# Patient Record
Sex: FEMALE | Race: BLACK OR AFRICAN AMERICAN | NOT HISPANIC OR LATINO | Employment: FULL TIME | URBAN - NONMETROPOLITAN AREA
[De-identification: names, ages, dates, MRNs, and addresses within clinical notes are randomized per-mention and may not be internally consistent; named-entity substitution may affect disease eponyms.]

---

## 2017-02-13 ENCOUNTER — OFFICE VISIT (OUTPATIENT)
Dept: NEUROLOGY | Facility: CLINIC | Age: 37
End: 2017-02-13

## 2017-02-13 VITALS
DIASTOLIC BLOOD PRESSURE: 84 MMHG | HEART RATE: 86 BPM | HEIGHT: 63 IN | BODY MASS INDEX: 30.3 KG/M2 | SYSTOLIC BLOOD PRESSURE: 122 MMHG | WEIGHT: 171 LBS

## 2017-02-13 DIAGNOSIS — G40.109 LOCALIZATION-RELATED SYMPTOMATIC EPILEPSY AND EPILEPTIC SYNDROMES WITH SIMPLE PARTIAL SEIZURES, NOT INTRACTABLE, WITHOUT STATUS EPILEPTICUS (HCC): Primary | ICD-10-CM

## 2017-02-13 DIAGNOSIS — R51.9 GENERALIZED HEADACHES: ICD-10-CM

## 2017-02-13 DIAGNOSIS — Q28.2 CEREBRAL AVM: ICD-10-CM

## 2017-02-13 DIAGNOSIS — Z98.890 S/P CRANIOTOMY: ICD-10-CM

## 2017-02-13 PROCEDURE — 99214 OFFICE O/P EST MOD 30 MIN: CPT | Performed by: CLINICAL NURSE SPECIALIST

## 2017-02-13 RX ORDER — TOPIRAMATE 50 MG/1
50 CAPSULE, EXTENDED RELEASE ORAL NIGHTLY
Qty: 14 CAPSULE | Refills: 0 | COMMUNITY
Start: 2017-02-13 | End: 2017-03-16

## 2017-02-13 RX ORDER — TOPIRAMATE 25 MG/1
25 CAPSULE, EXTENDED RELEASE ORAL NIGHTLY
Qty: 7 CAPSULE | Refills: 0 | COMMUNITY
Start: 2017-02-13 | End: 2017-03-16

## 2017-02-13 NOTE — PROGRESS NOTES
Subjective     Chief Complaint   Patient presents with   • Seizures     No seizure since last visit. She does have concerns of twitching above her lip on the left side.        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  She is here today for follow up for partial seizures, HA, and SP AVM, crani with clipping. She was last seen 8/2016.  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  Some time after her last appointment she stopped all medications except verapamil that she took 3-4 times per week. Reason she stopped taking AEDs Vimpat and Topamax (which was using dual therapy) was because it was causing sleepiness/dizziness when she was driving home after working night shift.  Since stopping she tells me she had one episode when she felt like she may have had a seizure in her sleep because she was extremely sore on the left side. This occurred in August 2016.  She denies having any since. She does a few times a week during the night shift have left facial twitching, but no loss of consciousness.  She also has HA 2-3 times per week. She denies any change in her condition. She did have labs done in 12/2016 that I have reviewed.   Seizures    This is a chronic problem. Episode onset: August 2016. Associated symptoms include sleepiness, confusion, headaches, nausea, vomiting and muscle weakness (soreness). Pertinent negatives include no sore throat, no chest pain, no cough and no diarrhea. Characteristics include rhythmic jerking and loss of consciousness. Characteristics do not include bowel incontinence, bladder incontinence or bit tongue. staring spelss, left lip twitch, during then night   Headache    This is a chronic problem. The current episode started more than 1 year ago. Episode frequency: 2/week. The problem has been unchanged. The pain is located in the bilateral and frontal (wraps around head) region. Associated symptoms include dizziness,  nausea, phonophobia, photophobia, seizures, tingling (left side of face) and vomiting. Pertinent negatives include no coughing, fever, sinus pressure, sore throat, tinnitus or weakness. The symptoms are aggravated by food. Treatments tried: verapamil , topamax. The treatment provided significant relief.        Current Outpatient Prescriptions   Medication Sig Dispense Refill   • Eslicarbazepine Acetate 400 MG tablet Take 400 mg by mouth Every Night. 7 tablet 0   • Eslicarbazepine Acetate 800 MG tablet Take 800 mg by mouth Every Night. 14 tablet 0   • Topiramate ER (TROKENDI XR) 25 MG capsule sustained-release 24 hr Take 25 mg by mouth Every Night. 7 capsule 0   • Topiramate ER 50 MG capsule sustained-release 24 hr Take 50 mg by mouth Every Night. 14 capsule 0   • verapamil SR (CALAN-SR) 120 MG CR tablet Take 1 tablet by mouth Every Night. 30 tablet 2     No current facility-administered medications for this visit.        Past Medical History   Diagnosis Date   • Cerebral aneurysm    • GERD (gastroesophageal reflux disease)    • Hypertension        Past Surgical History   Procedure Laterality Date   • Hysterectomy     • Craniotomy     • Cholecystectomy     • Endometrial ablation     •  section       x2   • Appendectomy         family history includes No Known Problems in her brother, brother, brother, brother, daughter, daughter, and sister; Rheum arthritis in her mother.    Social History   Substance Use Topics   • Smoking status: Never Smoker   • Smokeless tobacco: Never Used   • Alcohol use No       Review of Systems   Constitutional: Negative.  Negative for fatigue and fever.   HENT: Negative for sinus pressure, sore throat and tinnitus.    Eyes: Positive for photophobia.   Respiratory: Negative.  Negative for cough, chest tightness and shortness of breath.    Cardiovascular: Negative.  Negative for chest pain.   Gastrointestinal: Positive for nausea and vomiting. Negative for bowel incontinence,  "constipation and diarrhea.   Endocrine: Negative.    Genitourinary: Negative.  Negative for bladder incontinence, dysuria and frequency.   Musculoskeletal: Negative for arthralgias and myalgias.   Skin: Negative.    Allergic/Immunologic: Negative.    Neurological: Positive for dizziness, tingling (left side of face), seizures, loss of consciousness and headaches. Negative for facial asymmetry and weakness.   Hematological: Negative.  Negative for adenopathy.   Psychiatric/Behavioral: Positive for confusion. Negative for agitation.   All other systems reviewed and are negative.      Objective     Visit Vitals   • /84   • Pulse 86   • Ht 63\" (160 cm)   • Wt 171 lb (77.6 kg)   • BMI 30.29 kg/m2   , Body mass index is 30.29 kg/(m^2).    Physical Exam   Constitutional: She is oriented to person, place, and time. Vital signs are normal. She appears well-developed and well-nourished.   HENT:   Head: Normocephalic and atraumatic.   Right Ear: Hearing and external ear normal.   Left Ear: Hearing and external ear normal.   Nose: Nose normal.   Mouth/Throat: Uvula is midline, oropharynx is clear and moist and mucous membranes are normal.   Eyes: EOM and lids are normal. Pupils are equal, round, and reactive to light.   Neck: Trachea normal and normal range of motion. Neck supple. Carotid bruit is not present.   Cardiovascular: Normal rate, regular rhythm, S2 normal and normal heart sounds.    No murmur heard.  Pulmonary/Chest: Effort normal and breath sounds normal.   Abdominal: Soft. Bowel sounds are normal.   Musculoskeletal: Normal range of motion.   Neurological: She is alert and oriented to person, place, and time. She has normal strength and normal reflexes. She displays no tremor. No sensory deficit. She displays a negative Romberg sign. Gait normal. Abnormal coordination: past pointing bilateral finger to nose. no ataxia. GCS eye subscore is 4. GCS verbal subscore is 5.   Reflex Scores:       Tricep reflexes are " 2+ on the right side and 2+ on the left side.       Bicep reflexes are 2+ on the right side and 2+ on the left side.       Brachioradialis reflexes are 2+ on the right side and 2+ on the left side.       Patellar reflexes are 2+ on the right side and 2+ on the left side.       Achilles reflexes are 2+ on the right side and 2+ on the left side.  CN II:  Visual fields intact in upper fields. Decrease in lower fields.  Pupils equally reactive to light  CN III, IV, VI:  Extraocular Muscles full with no signs of nystagmus  CN V:  Facial sensory is symmetric with no asymetries.  CN VII:  Facial motor symmetric  CN VIII:  Gross hearing intact bilaterally  CN IX:  Palate elevates symmetrically  CN X:  Palate elevates symmetrically  CN XI:  Shoulder shrug symmetric  CN XII:  Tongue is midline on protrusion   Skin: Skin is warm and dry.   Psychiatric: She has a normal mood and affect. Her speech is normal and behavior is normal. Cognition and memory are normal.   Nursing note and vitals reviewed.      Results for orders placed or performed during the hospital encounter of 12/18/16   Urine Culture   Result Value Ref Range    Urine Culture <10,000 CFU/mL Gram Negative Bacilli (A)     Urine Culture 10,000-20,000 CFU/mL Mixed Gram Positive Ade (A)    Comprehensive Metabolic Panel   Result Value Ref Range    Glucose 131 (H) 70 - 100 mg/dL    BUN 16 5 - 21 mg/dL    Creatinine 0.86 0.50 - 1.40 mg/dL    Sodium 138 135 - 145 mmol/L    Potassium 3.8 3.5 - 5.3 mmol/L    Chloride 102 98 - 110 mmol/L    CO2 23.0 (L) 24.0 - 31.0 mmol/L    Calcium 9.7 8.4 - 10.4 mg/dL    Total Protein 8.1 6.3 - 8.7 g/dL    Albumin 4.40 3.50 - 5.00 g/dL    ALT (SGPT) 24 0 - 54 U/L    AST (SGOT) 21 7 - 45 U/L    Alkaline Phosphatase 72 24 - 120 U/L    Total Bilirubin 0.6 0.1 - 1.0 mg/dL    eGFR  African Amer 90 >60 mL/min/1.73    Globulin 3.7 gm/dL    A/G Ratio 1.2 1.1 - 2.5 g/dL    BUN/Creatinine Ratio 18.6 7.0 - 25.0    Anion Gap 13.0 4.0 - 13.0 mmol/L    aPTT   Result Value Ref Range    PTT 26.9 24.1 - 34.8 seconds   Protime-INR   Result Value Ref Range    Protime 13.1 11.9 - 14.6 Seconds    INR 0.96 0.91 - 1.09   Amylase   Result Value Ref Range    Amylase 64 30 - 110 U/L   Lipase   Result Value Ref Range    Lipase 98 23 - 203 U/L   Urinalysis With / Culture If Indicated   Result Value Ref Range    Color, UA Dark Yellow (A) Yellow, Straw    Appearance, UA Cloudy (A) Clear    pH, UA 6.5 5.0 - 8.0    Specific Gravity, UA 1.028 1.005 - 1.030    Glucose, UA Negative Negative    Ketones, UA Trace (A) Negative    Bilirubin, UA Negative Negative    Blood, UA Negative Negative    Protein, UA Negative Negative    Leuk Esterase, UA Trace (A) Negative    Nitrite, UA Negative Negative    Urobilinogen, UA 0.2 E.U./dL 0.2 - 1.0 E.U./dL   CBC Auto Differential   Result Value Ref Range    WBC 12.03 (H) 4.80 - 10.80 10*3/mm3    RBC 4.78 4.20 - 5.40 10*6/mm3    Hemoglobin 13.3 12.0 - 16.0 g/dL    Hematocrit 40.2 37.0 - 47.0 %    MCV 84.1 82.0 - 98.0 fL    MCH 27.8 (L) 28.0 - 32.0 pg    MCHC 33.1 33.0 - 36.0 g/dL    RDW 13.9 12.0 - 15.0 %    RDW-SD 42.4 40.0 - 54.0 fl    MPV 11.2 6.0 - 12.0 fL    Platelets 284 130 - 400 10*3/mm3    Neutrophil % 82.4 (H) 39.0 - 78.0 %    Lymphocyte % 13.5 (L) 15.0 - 45.0 %    Monocyte % 3.6 (L) 4.0 - 12.0 %    Eosinophil % 0.2 0.0 - 4.0 %    Basophil % 0.1 0.0 - 2.0 %    Immature Grans % 0.2 0.0 - 5.0 %    Neutrophils, Absolute 9.91 (H) 1.87 - 8.40 10*3/mm3    Lymphocytes, Absolute 1.63 0.72 - 4.86 10*3/mm3    Monocytes, Absolute 0.43 0.19 - 1.30 10*3/mm3    Eosinophils, Absolute 0.02 0.00 - 0.70 10*3/mm3    Basophils, Absolute 0.01 0.00 - 0.20 10*3/mm3    Immature Grans, Absolute 0.03 0.00 - 0.03 10*3/mm3   Urinalysis, Microscopic Only   Result Value Ref Range    RBC, UA 0-2 (A) None Seen /HPF    WBC, UA 6-12 (A) None Seen /HPF    Bacteria, UA 2+ (A) None Seen /HPF    Squamous Epithelial Cells, UA 13-20 (A) None Seen, 0-2 /HPF    Hyaline Casts, UA  7-12 None Seen /LPF    Methodology Automated Microscopy    POCT Occult Blood, stool   Result Value Ref Range    Fecal Occult Blood Negative     Lot Number [id number]     Expiration Date      DEVELOPER LOT NUMBER \[id number]\     DEVELOPER EXPIRATION DATE \     Positive Control Positive Positive    Negative Control Negative Negative   Type & Screen   Result Value Ref Range    ABO Type A     RH type Positive     Antibody Screen Negative         ASSESSMENT/PLAN    Diagnoses and all orders for this visit:    Localization-related symptomatic epilepsy and epileptic syndromes with simple partial seizures, not intractable, without status epilepticus    Cerebral AVM    S/P craniotomy  Comments:  clipping of AVM    Generalized headaches    Other orders  -     verapamil SR (CALAN-SR) 120 MG CR tablet; Take 1 tablet by mouth Every Night.  -     Topiramate ER (TROKENDI XR) 25 MG capsule sustained-release 24 hr; Take 25 mg by mouth Every Night.  -     Topiramate ER 50 MG capsule sustained-release 24 hr; Take 50 mg by mouth Every Night.  -     Eslicarbazepine Acetate 400 MG tablet; Take 400 mg by mouth Every Night.  -     Eslicarbazepine Acetate 800 MG tablet; Take 800 mg by mouth Every Night.    MEDICAL DECISION MAKIN. Continue with Verapamil 120 mg at HS for HA.  2. Patient had stopped vimpat and topamax and will remain off of these for now.  3. Will start Trokendi XR 25mg titration to 50 mg at HS for now.counseled on side effects and patient has taken in the past and previously tolerated. Will likely need to increase dose at next follow up.  If insurance will not cover Trokendi will likely need to go back to Topamax.   4. Will start Aptiom 400 mg daily for one week then 800 mg daily there after and will likely increase to 1200 mg daily at next follow up if patient tolerating well. counseled on side effects.  5. Obtain CBC, CMP at next follow up.   allergies and all known medications/prescriptions have been reviewed  using resources available on this encounter.  6. Seizure precautions were discussed to include no tub baths, no swimming, avoiding lack of sleep, and avoiding known triggers. Education given of things that may contribute to a seizure to include, but not limited to: stressful situations, fever, fatigue, lack of sleep, low blood sugar, hyperventilation, flashing lights, and caffeine. Instructions given to take seizure medications as prescribed. Education given to family member on what to do during a seizure and care following the seizure. Education given to contact this office prior to stopping or changing any medications.  7. BRYCE REVIEWED.    Return in about 2 weeks (around 2/27/2017).        Keeley Bourne, APRN

## 2017-02-28 ENCOUNTER — OFFICE VISIT (OUTPATIENT)
Dept: NEUROLOGY | Facility: CLINIC | Age: 37
End: 2017-02-28

## 2017-02-28 VITALS
HEART RATE: 94 BPM | HEIGHT: 63 IN | DIASTOLIC BLOOD PRESSURE: 84 MMHG | BODY MASS INDEX: 29.06 KG/M2 | SYSTOLIC BLOOD PRESSURE: 122 MMHG | WEIGHT: 164 LBS | RESPIRATION RATE: 16 BRPM

## 2017-02-28 DIAGNOSIS — Q28.2 CEREBRAL AVM: ICD-10-CM

## 2017-02-28 DIAGNOSIS — Z98.890 S/P CRANIOTOMY: ICD-10-CM

## 2017-02-28 DIAGNOSIS — G40.109 LOCALIZATION-RELATED SYMPTOMATIC EPILEPSY AND EPILEPTIC SYNDROMES WITH SIMPLE PARTIAL SEIZURES, NOT INTRACTABLE, WITHOUT STATUS EPILEPTICUS (HCC): Primary | ICD-10-CM

## 2017-02-28 DIAGNOSIS — R51.9 GENERALIZED HEADACHES: ICD-10-CM

## 2017-02-28 PROCEDURE — 99213 OFFICE O/P EST LOW 20 MIN: CPT | Performed by: CLINICAL NURSE SPECIALIST

## 2017-02-28 RX ORDER — IBUPROFEN 600 MG/1
TABLET ORAL EVERY 6 HOURS PRN
COMMUNITY
Start: 2015-04-05 | End: 2017-10-13

## 2017-02-28 RX ORDER — POLYETHYLENE GLYCOL 3350 17 G/17G
POWDER, FOR SOLUTION ORAL
COMMUNITY
End: 2017-10-13

## 2017-02-28 RX ORDER — TOPIRAMATE 100 MG/1
1 CAPSULE, EXTENDED RELEASE ORAL NIGHTLY
Qty: 7 CAPSULE | Refills: 0 | COMMUNITY
Start: 2017-02-28 | End: 2017-03-16

## 2017-02-28 RX ORDER — ALBUTEROL SULFATE 90 UG/1
AEROSOL, METERED RESPIRATORY (INHALATION)
COMMUNITY
End: 2019-10-23

## 2017-02-28 NOTE — PROGRESS NOTES
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 She resumed care 2/13/17 with prior visit on  8/2016.  Trokendi XR had been resumed as well as addition of Aptiom titrating to 800 mg daily. Patient is tolerating and states she has less sleepiness from this combination of treatment.   Since the start of Aptiom and Trokendi she states she has had no further facial twitches or staring episodes and actually feels very well. She denies side effects associated with the medications.    She is currently being treated for bronchitis.     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 Some time after her last appointment she stopped all medications except verapamil that she took 3-4 times per week. Reason she stopped taking AEDs Vimpat and Topamax (which was using dual therapy) was because it was causing sleepiness/dizziness when she was driving home after working night shift. Since stopping she tells me she had one episode when she felt like she may have had a seizure in her sleep because she was extremely sore on the left side. This occurred in August 2016. She denies having any since. She does a few times a week during the night shift have left facial twitching, but no loss of consciousness. She also has HA 2-3 times per week. She denies any change in her condition. She did have labs done in 12/2016 that I have reviewed.   HPI Comments: Seizure/spell on 2/15/17:  Was not feeling well that morning was having involuntary tremor and had not started the Trokendi or Aptiom  Did not lose consciousness, but did stare off  Lasted about 3-4 minutes  Still working nights so taking in the morning when going to bed in the morning  No tongue biting, incontinence.       Seizures     This is a chronic problem. Episode onset: August 2016. Associated symptoms include sleepiness, confusion, headaches, nausea, vomiting and muscle weakness (soreness). Pertinent negatives include no sore throat, no chest pain, no cough and no diarrhea. Characteristics include rhythmic jerking and loss of consciousness. Characteristics do not include bowel incontinence, bladder incontinence or bit tongue. staring spelss, left lip twitch, during then night   Headache    This is a chronic problem. The current episode started more than 1 year ago. Episode frequency: 2/week. The problem has been unchanged. The pain is located in the bilateral and frontal (wraps around head) region. Associated symptoms include dizziness, nausea, phonophobia, photophobia, seizures, tingling (left side of face) and vomiting. Pertinent negatives include no coughing, fever, sinus pressure, sore throat, tinnitus or weakness. The symptoms are aggravated by food. Treatments tried: verapamil , topamax. The treatment provided significant relief.        Current Outpatient Prescriptions   Medication Sig Dispense Refill   • albuterol (PROVENTIL HFA;VENTOLIN HFA) 108 (90 BASE) MCG/ACT inhaler Inhale into the lungs.     • doxycycline (MONODOX) 100 MG capsule Take 1 capsule by mouth 2 (Two) Times a Day for 10 days. (avoid significant sun exposure while on this medication) 20 capsule 0   • Eslicarbazepine Acetate 400 MG tablet Take 400 mg by mouth Every Night. 7 tablet 0   • Eslicarbazepine Acetate 800 MG tablet Take 800 mg by mouth Every Night. 14 tablet 0   • ibuprofen (ADVIL,MOTRIN) 600 MG tablet      • lacosamide (VIMPAT) 100 MG tablet tablet Take 100 mg by mouth 2 times daily.     • omeprazole (priLOSEC) 20 MG capsule Take 20 mg by mouth daily.     • polyethylene glycol (MIRALAX) powder Take 17 g by mouth daily.     • Topiramate ER (TROKENDI XR) 25 MG capsule sustained-release 24 hr Take 25 mg by mouth Every Night. 7 capsule 0   • Topiramate ER 50  MG capsule sustained-release 24 hr Take 50 mg by mouth Every Night. 14 capsule 0   • verapamil SR (CALAN-SR) 120 MG CR tablet Take 1 tablet by mouth Every Night. 30 tablet 2   • benzonatate (TESSALON) 200 MG capsule Take 1 capsule by mouth 3 (Three) Times a Day As Needed for cough for up to 7 days. 20 capsule 0     No current facility-administered medications for this visit.        Past Medical History   Diagnosis Date   • Cerebral aneurysm    • GERD (gastroesophageal reflux disease)    • Hypertension        Past Surgical History   Procedure Laterality Date   • Hysterectomy     • Craniotomy     • Cholecystectomy     • Endometrial ablation     •  section       x2   • Appendectomy         family history includes No Known Problems in her brother, brother, brother, brother, daughter, daughter, and sister; Rheum arthritis in her mother.    Social History   Substance Use Topics   • Smoking status: Never Smoker   • Smokeless tobacco: Never Used   • Alcohol use No       Review of Systems   Constitutional: Negative.  Negative for fatigue and fever.   HENT: Negative for sinus pressure, sore throat and tinnitus.    Eyes: Positive for photophobia.   Respiratory: Negative.  Negative for cough, chest tightness and shortness of breath.    Cardiovascular: Negative.  Negative for chest pain.   Gastrointestinal: Positive for nausea and vomiting. Negative for bowel incontinence, constipation and diarrhea.   Endocrine: Negative.    Genitourinary: Negative.  Negative for bladder incontinence, dysuria and frequency.   Musculoskeletal: Negative for arthralgias and myalgias.   Skin: Negative.    Allergic/Immunologic: Negative.    Neurological: Positive for dizziness, tingling (left side of face), seizures, loss of consciousness and headaches. Negative for facial asymmetry and weakness.   Hematological: Negative.  Negative for adenopathy.   Psychiatric/Behavioral: Positive for confusion. Negative for agitation.   All other systems  "reviewed and are negative.      Objective     Visit Vitals   • /84 (BP Location: Left arm, Patient Position: Sitting, Cuff Size: Adult)   • Pulse 94   • Resp 16   • Ht 63\" (160 cm)   • Wt 164 lb (74.4 kg)   • BMI 29.05 kg/m2   , Body mass index is 29.05 kg/(m^2).    Physical Exam   Constitutional: She is oriented to person, place, and time. Vital signs are normal. She appears well-developed and well-nourished.   HENT:   Head: Normocephalic and atraumatic.   Right Ear: Hearing and external ear normal.   Left Ear: Hearing and external ear normal.   Nose: Nose normal.   Mouth/Throat: Uvula is midline, oropharynx is clear and moist and mucous membranes are normal.   Eyes: EOM and lids are normal. Pupils are equal, round, and reactive to light.   Neck: Trachea normal and normal range of motion. Neck supple. Carotid bruit is not present.   Cardiovascular: Normal rate, regular rhythm, S2 normal and normal heart sounds.    No murmur heard.  Pulmonary/Chest: Effort normal and breath sounds normal.   Abdominal: Soft. Bowel sounds are normal.   Musculoskeletal: Normal range of motion.   Neurological: She is alert and oriented to person, place, and time. She has normal strength and normal reflexes. She displays no tremor. No sensory deficit. She displays a negative Romberg sign. Gait normal. Abnormal coordination: past pointing bilateral finger to nose. no ataxia. GCS eye subscore is 4. GCS verbal subscore is 5.   Reflex Scores:       Tricep reflexes are 2+ on the right side and 2+ on the left side.       Bicep reflexes are 2+ on the right side and 2+ on the left side.       Brachioradialis reflexes are 2+ on the right side and 2+ on the left side.       Patellar reflexes are 2+ on the right side and 2+ on the left side.       Achilles reflexes are 2+ on the right side and 2+ on the left side.  CN II:  Visual fields intact in upper fields. Decrease in lower fields.  Pupils equally reactive to light  CN III, IV, VI:  " Extraocular Muscles full with no signs of nystagmus  CN V:  Facial sensory is symmetric with no asymetries.  CN VII:  Facial motor symmetric  CN VIII:  Gross hearing intact bilaterally  CN IX:  Palate elevates symmetrically  CN X:  Palate elevates symmetrically  CN XI:  Shoulder shrug symmetric  CN XII:  Tongue is midline on protrusion   Skin: Skin is warm and dry.   Psychiatric: She has a normal mood and affect. Her speech is normal and behavior is normal. Cognition and memory are normal.   Nursing note and vitals reviewed.      Results for orders placed or performed during the hospital encounter of 17   POCT Influenza A/B   Result Value Ref Range    Rapid Influenza A Ag NEG     Rapid Influenza B Ag NEG     Internal Control Passed Passed    Lot Number [id number]     Expiration Date 18         ASSESSMENT/PLAN    Diagnoses and all orders for this visit:    Cerebral AVM    Localization-related symptomatic epilepsy and epileptic syndromes with simple partial seizures, not intractable, without status epilepticus    Generalized headaches    S/P craniotomy    Other orders  -     albuterol (PROVENTIL HFA;VENTOLIN HFA) 108 (90 BASE) MCG/ACT inhaler; Inhale into the lungs.  -     ibuprofen (ADVIL,MOTRIN) 600 MG tablet;   -     polyethylene glycol (MIRALAX) powder; Take 17 g by mouth daily.      MEDICAL DECISION MAKIN. Titrate Aptiom to 1200 mg daily.  2. Titrate Trokendi XR to 200 mg daily.  3. Patient counseled on compliance with medications and side effects.   4. Obtain CT head  5. Seizure precautions were discussed to include no tub baths, no swimming, avoiding lack of sleep, and avoiding known triggers. Education given of things that may contribute to a seizure to include, but not limited to: stressful situations, fever, fatigue, lack of sleep, low blood sugar, hyperventilation, flashing lights, and caffeine. Instructions given to take seizure medications as prescribed. Education given to family member on  what to do during a seizure and care following the seizure. Education given to contact this office prior to stopping or changing any medications.       allergies and all known medications/prescriptions have been reviewed using resources available on this encounter.    No Follow-up on file.        Keeley Bourne, ARA

## 2017-03-09 ENCOUNTER — HOSPITAL ENCOUNTER (OUTPATIENT)
Dept: CT IMAGING | Facility: HOSPITAL | Age: 37
Discharge: HOME | End: 2017-03-09
Admitting: CLINICAL NURSE SPECIALIST

## 2017-03-09 DIAGNOSIS — G40.109 LOCALIZATION-RELATED SYMPTOMATIC EPILEPSY AND EPILEPTIC SYNDROMES WITH SIMPLE PARTIAL SEIZURES, NOT INTRACTABLE, WITHOUT STATUS EPILEPTICUS (HCC): ICD-10-CM

## 2017-03-09 DIAGNOSIS — R51.9 GENERALIZED HEADACHES: ICD-10-CM

## 2017-03-09 DIAGNOSIS — Z98.890 S/P CRANIOTOMY: ICD-10-CM

## 2017-03-09 DIAGNOSIS — Q28.2 CEREBRAL AVM: ICD-10-CM

## 2017-03-09 PROCEDURE — 70450 CT HEAD/BRAIN W/O DYE: CPT

## 2017-03-16 ENCOUNTER — OFFICE VISIT (OUTPATIENT)
Dept: NEUROLOGY | Facility: CLINIC | Age: 37
End: 2017-03-16

## 2017-03-16 VITALS
DIASTOLIC BLOOD PRESSURE: 68 MMHG | HEIGHT: 63 IN | SYSTOLIC BLOOD PRESSURE: 100 MMHG | BODY MASS INDEX: 29.06 KG/M2 | WEIGHT: 164 LBS | HEART RATE: 78 BPM

## 2017-03-16 DIAGNOSIS — Q28.2 CEREBRAL AVM: ICD-10-CM

## 2017-03-16 DIAGNOSIS — Z98.890 S/P CRANIOTOMY: ICD-10-CM

## 2017-03-16 DIAGNOSIS — G40.109 LOCALIZATION-RELATED SYMPTOMATIC EPILEPSY AND EPILEPTIC SYNDROMES WITH SIMPLE PARTIAL SEIZURES, NOT INTRACTABLE, WITHOUT STATUS EPILEPTICUS (HCC): Primary | ICD-10-CM

## 2017-03-16 DIAGNOSIS — R51.9 GENERALIZED HEADACHES: ICD-10-CM

## 2017-03-16 PROCEDURE — 99213 OFFICE O/P EST LOW 20 MIN: CPT | Performed by: CLINICAL NURSE SPECIALIST

## 2017-03-16 NOTE — PROGRESS NOTES
Subjective     Chief Complaint   Patient presents with   • Seizures     No sz       Mandy Barrera is a 36 y.o. 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  She is here today for follow up for HA and seizure. She was last seen 2/28/17.  She is now taking Aptiom 1200 mg nightly and Trokendi  mg nightly. She denies seizure, staring spells, involuntary tremor, or facial twitches. The last reported symptoms was 2/15 and she had started her medication. She denies side effects with Aptiom.  She does have some paresthesia with Trokendi. She denies HA.  She did have CT of head as she has history of AVM with clipping and does have some residual deficit of lower visual fields. I have reviewed results with the patient.    HPI Comments: Seizure/spell on 2/15/17:  Was not feeling well that morning was having involuntary tremor and had not started the Trokendi or Aptiom  Did not lose consciousness, but did stare off  Lasted about 3-4 minutes  Still working nights so taking in the morning when going to bed in the morning  No tongue biting, incontinence.       Seizures    This is a chronic problem. Episode onset: August 2016. Associated symptoms include sleepiness, confusion, headaches, nausea, vomiting and muscle weakness (soreness). Pertinent negatives include no sore throat, no chest pain, no cough and no diarrhea. Characteristics include rhythmic jerking and loss of consciousness. Characteristics do not include bowel incontinence, bladder incontinence or bit tongue. staring spelss, left lip twitch, during then night   Headache    This is a chronic problem. The current episode started more than 1 year ago. Episode frequency: 2/week. The problem has been unchanged. The pain is located in the bilateral and frontal (wraps around head) region. Associated symptoms include dizziness, nausea, phonophobia, photophobia, seizures, tingling  (left side of face) and vomiting. Pertinent negatives include no coughing, fever, sinus pressure, sore throat, tinnitus or weakness. The symptoms are aggravated by food. Treatments tried: verapamil , topamax. The treatment provided significant relief.        Current Outpatient Prescriptions   Medication Sig Dispense Refill   • Eslicarbazepine Acetate (APTIOM) 800 MG tablet Take 1 tablet by mouth Every Night. 30 tablet 5   • Eslicarbazepine Acetate 400 MG tablet Take 400 mg by mouth Every Night. 30 tablet 5   • ibuprofen (ADVIL,MOTRIN) 600 MG tablet Take  by mouth Every 6 (Six) Hours As Needed.     • lacosamide (VIMPAT) 100 MG tablet tablet Take 100 mg by mouth 2 times daily.     • omeprazole (priLOSEC) 20 MG capsule Take 20 mg by mouth daily.     • polyethylene glycol (MIRALAX) powder Take 17 g by mouth daily.     • Topiramate  MG capsule sustained-release 24 hr Take 1 capsule by mouth Every Night. 30 capsule 5   • verapamil SR (CALAN-SR) 120 MG CR tablet Take 1 tablet by mouth Every Night. 30 tablet 2   • albuterol (PROVENTIL HFA;VENTOLIN HFA) 108 (90 BASE) MCG/ACT inhaler Inhale into the lungs.     • Topiramate ER (TROKENDI XR) 200 MG capsule sustained-release 24 hr Take 200 mg by mouth Every Night. 14 capsule 0     No current facility-administered medications for this visit.        Past Medical History   Diagnosis Date   • Cerebral aneurysm    • GERD (gastroesophageal reflux disease)    • Hypertension        Past Surgical History   Procedure Laterality Date   • Hysterectomy     • Craniotomy     • Cholecystectomy     • Endometrial ablation     •  section       x2   • Appendectomy         family history includes No Known Problems in her brother, brother, brother, brother, daughter, daughter, and sister; Rheum arthritis in her mother.    Social History   Substance Use Topics   • Smoking status: Never Smoker   • Smokeless tobacco: Never Used   • Alcohol use No       Review of Systems   Constitutional:  "Negative.  Negative for fatigue and fever.   HENT: Negative for sinus pressure, sore throat and tinnitus.    Eyes: Positive for photophobia.   Respiratory: Negative.  Negative for cough, chest tightness and shortness of breath.    Cardiovascular: Negative.  Negative for chest pain.   Gastrointestinal: Positive for nausea and vomiting. Negative for bowel incontinence, constipation and diarrhea.   Endocrine: Negative.    Genitourinary: Negative.  Negative for bladder incontinence, dysuria and frequency.   Musculoskeletal: Negative for arthralgias and myalgias.   Skin: Negative.    Allergic/Immunologic: Negative.    Neurological: Positive for dizziness, tingling (left side of face), seizures, loss of consciousness and headaches. Negative for facial asymmetry and weakness.   Hematological: Negative.  Negative for adenopathy.   Psychiatric/Behavioral: Positive for confusion. Negative for agitation.   All other systems reviewed and are negative.      Objective     Visit Vitals   • /68   • Pulse 78   • Ht 63\" (160 cm)   • Wt 164 lb (74.4 kg)   • BMI 29.05 kg/m2   , Body mass index is 29.05 kg/(m^2).    Physical Exam   Constitutional: She is oriented to person, place, and time. Vital signs are normal. She appears well-developed and well-nourished.   HENT:   Head: Normocephalic and atraumatic.   Right Ear: Hearing and external ear normal.   Left Ear: Hearing and external ear normal.   Nose: Nose normal.   Mouth/Throat: Uvula is midline, oropharynx is clear and moist and mucous membranes are normal.   Eyes: EOM and lids are normal. Pupils are equal, round, and reactive to light.   Neck: Trachea normal and normal range of motion. Neck supple. Carotid bruit is not present.   Cardiovascular: Normal rate, regular rhythm, S2 normal and normal heart sounds.    No murmur heard.  Pulmonary/Chest: Effort normal and breath sounds normal.   Abdominal: Soft. Bowel sounds are normal.   Musculoskeletal: Normal range of motion. "   Neurological: She is alert and oriented to person, place, and time. She has normal strength and normal reflexes. She displays no tremor. No sensory deficit. She displays a negative Romberg sign. Gait normal. Abnormal coordination: past pointing bilateral finger to nose. no ataxia. GCS eye subscore is 4. GCS verbal subscore is 5.   Reflex Scores:       Tricep reflexes are 2+ on the right side and 2+ on the left side.       Bicep reflexes are 2+ on the right side and 2+ on the left side.       Brachioradialis reflexes are 2+ on the right side and 2+ on the left side.       Patellar reflexes are 2+ on the right side and 2+ on the left side.       Achilles reflexes are 2+ on the right side and 2+ on the left side.  CN II:  Visual fields intact in upper fields. Decrease in lower fields.  Pupils equally reactive to light  CN III, IV, VI:  Extraocular Muscles full with no signs of nystagmus  CN V:  Facial sensory is symmetric with no asymetries.  CN VII:  Facial motor symmetric  CN VIII:  Gross hearing intact bilaterally  CN IX:  Palate elevates symmetrically  CN X:  Palate elevates symmetrically  CN XI:  Shoulder shrug symmetric  CN XII:  Tongue is midline on protrusion   Skin: Skin is warm and dry.   Psychiatric: She has a normal mood and affect. Her speech is normal and behavior is normal. Cognition and memory are normal.   Nursing note and vitals reviewed.         CT HEAD:  IMPRESSION:  1. Postsurgical changes right posterior parietal lobe.  2. Stable CT appearance without acute intracranial process.      ASSESSMENT/PLAN    Diagnoses and all orders for this visit:    Localization-related symptomatic epilepsy and epileptic syndromes with simple partial seizures, not intractable, without status epilepticus    Generalized headaches    Cerebral AVM    S/P craniotomy    Other orders  -     Discontinue: Eslicarbazepine Acetate (APTIOM) 400 MG tablet; Take 1 tablet by mouth Every Night.  -     Eslicarbazepine Acetate 400 MG  tablet; Take 400 mg by mouth Every Night.  -     Topiramate  MG capsule sustained-release 24 hr; Take 1 capsule by mouth Every Night.  -     Topiramate ER (TROKENDI XR) 200 MG capsule sustained-release 24 hr; Take 200 mg by mouth Every Night.  -     Eslicarbazepine Acetate (APTIOM) 800 MG tablet; Take 1 tablet by mouth Every Night.      MEDICAL DECISION MAKIN. Continue with Aptiom 1200 mg nightly  2. Continue with Trokendi  mg nightly  3. Seizure precautions were discussed to include no tub baths, no swimming, avoiding lack of sleep, and avoiding known triggers. Education given of things that may contribute to a seizure to include, but not limited to: stressful situations, fever, fatigue, lack of sleep, low blood sugar, hyperventilation, flashing lights, and caffeine. Instructions given to take seizure medications as prescribed. Education given to family member on what to do during a seizure and care following the seizure. Education given to contact this office prior to stopping or changing any medications.         allergies and all known medications/prescriptions have been reviewed using resources available on this encounter.    Return in about 3 months (around 2017).        ARA Faith

## 2017-03-21 ENCOUNTER — TELEPHONE (OUTPATIENT)
Dept: NEUROLOGY | Facility: CLINIC | Age: 37
End: 2017-03-21

## 2017-03-21 NOTE — TELEPHONE ENCOUNTER
Needs the following prescriptions refilled.    Eslicarbazepine Acetate (APTIOM)    Topiramate ER (TROKENDI XR) 200 MG    She uses [facility] at [facility]

## 2017-03-24 RX ORDER — LACOSAMIDE 100 MG/1
100 TABLET ORAL EVERY 12 HOURS SCHEDULED
Qty: 60 TABLET | Refills: 5 | Status: SHIPPED | OUTPATIENT
Start: 2017-03-24 | End: 2017-10-13

## 2017-03-27 ENCOUNTER — TELEPHONE (OUTPATIENT)
Dept: NEUROLOGY | Facility: CLINIC | Age: 37
End: 2017-03-27

## 2017-03-27 ENCOUNTER — OFFICE VISIT (OUTPATIENT)
Dept: OBSTETRICS AND GYNECOLOGY | Facility: CLINIC | Age: 37
End: 2017-03-27

## 2017-03-27 VITALS
WEIGHT: 164 LBS | SYSTOLIC BLOOD PRESSURE: 118 MMHG | BODY MASS INDEX: 29.06 KG/M2 | DIASTOLIC BLOOD PRESSURE: 74 MMHG | HEIGHT: 63 IN

## 2017-03-27 DIAGNOSIS — N60.12 FIBROCYSTIC BREAST CHANGES OF BOTH BREASTS: ICD-10-CM

## 2017-03-27 DIAGNOSIS — R10.2 PELVIC PAIN IN FEMALE: ICD-10-CM

## 2017-03-27 DIAGNOSIS — Z78.9 NON-SMOKER: Primary | ICD-10-CM

## 2017-03-27 DIAGNOSIS — N60.11 FIBROCYSTIC BREAST CHANGES OF BOTH BREASTS: ICD-10-CM

## 2017-03-27 PROCEDURE — 99213 OFFICE O/P EST LOW 20 MIN: CPT | Performed by: OBSTETRICS & GYNECOLOGY

## 2017-03-27 RX ORDER — TOPIRAMATE 100 MG/1
TABLET, FILM COATED ORAL
Refills: 5 | COMMUNITY
Start: 2017-03-18 | End: 2017-10-13

## 2017-03-27 NOTE — PROGRESS NOTES
Subjective   Mandy Barrera is a 36 y.o. female is here today for follow-up.  Has decreased caffeine intake and has had complete resolution of breast symptoms.  No family history of breast cancer.  No new masses.  Pelvic pain has resolved.  No hx of abnormal paps or cone biopsies.  Hyst last year for benign disease.  No need for paps in the future.    Breast Pain   This is a chronic problem. The current episode started more than 1 month ago. The problem occurs rarely. The problem has been resolved. Pertinent negatives include no abdominal pain, anorexia, arthralgias, change in bowel habit, chest pain, chills, congestion, coughing, diaphoresis, fatigue, fever, headaches, joint swelling, myalgias, nausea, neck pain, numbness, rash, sore throat, swollen glands, urinary symptoms, vertigo, visual change, vomiting or weakness. Nothing aggravates the symptoms. Treatments tried: dietary changes. The treatment provided significant relief.       The following portions of the patient's history were reviewed and updated as appropriate: allergies, current medications, past family history, past medical history, past social history, past surgical history and problem list.    Review of Systems   Constitutional: Negative for chills, diaphoresis, fatigue and fever.   HENT: Negative for congestion and sore throat.    Respiratory: Negative for cough.    Cardiovascular: Negative for chest pain.   Gastrointestinal: Negative for abdominal pain, anorexia, change in bowel habit, nausea and vomiting.   Musculoskeletal: Negative for arthralgias, joint swelling, myalgias and neck pain.   Skin: Negative for rash.   Neurological: Negative for vertigo, weakness, numbness and headaches.       Objective   Physical Exam   Constitutional: She is oriented to person, place, and time. She appears well-developed and well-nourished.   Neck: Normal range of motion. Neck supple. No JVD present. No tracheal deviation present. No thyromegaly present.    Cardiovascular: Normal rate and regular rhythm.    Pulmonary/Chest: Effort normal and breath sounds normal. No stridor. She exhibits no mass and no tenderness. Right breast exhibits no inverted nipple, no mass, no nipple discharge, no skin change and no tenderness. Left breast exhibits no inverted nipple, no mass, no nipple discharge, no skin change and no tenderness. Breasts are symmetrical. There is no breast swelling.   Abdominal: Soft. Bowel sounds are normal. She exhibits no distension.   Genitourinary: No breast tenderness, discharge or bleeding.   Lymphadenopathy:     She has no cervical adenopathy.   Neurological: She is alert and oriented to person, place, and time.   Skin: Skin is warm and dry.   Psychiatric: She has a normal mood and affect. Her behavior is normal. Judgment and thought content normal.   Nursing note and vitals reviewed.        Assessment/Plan   Mandy was seen today for breast pain.    Diagnoses and all orders for this visit:    Non-smoker    Fibrocystic breast changes of both breasts    Pelvic pain in female  Comments:  Resolved s/p hyst for benign disease.  Normal pap history.  No need for future pap tests.        Anibal Escobar MD

## 2017-04-02 ENCOUNTER — APPOINTMENT (OUTPATIENT)
Dept: GENERAL RADIOLOGY | Facility: HOSPITAL | Age: 37
End: 2017-04-02

## 2017-04-02 ENCOUNTER — HOSPITAL ENCOUNTER (EMERGENCY)
Facility: HOSPITAL | Age: 37
Discharge: HOME | End: 2017-04-02
Admitting: NURSE PRACTITIONER

## 2017-04-02 VITALS
SYSTOLIC BLOOD PRESSURE: 105 MMHG | OXYGEN SATURATION: 97 % | DIASTOLIC BLOOD PRESSURE: 65 MMHG | WEIGHT: 165 LBS | RESPIRATION RATE: 17 BRPM | TEMPERATURE: 98.5 F | BODY MASS INDEX: 29.23 KG/M2 | HEIGHT: 63 IN | HEART RATE: 98 BPM

## 2017-04-02 DIAGNOSIS — B34.9 VIRAL SYNDROME: Primary | ICD-10-CM

## 2017-04-02 LAB
ALBUMIN SERPL-MCNC: 4.4 G/DL (ref 3.5–5)
ALBUMIN/GLOB SERPL: 1.2 G/DL (ref 1.1–2.5)
ALP SERPL-CCNC: 94 U/L (ref 24–120)
ALT SERPL W P-5'-P-CCNC: 24 U/L (ref 0–54)
AMYLASE SERPL-CCNC: 83 U/L (ref 30–110)
ANION GAP SERPL CALCULATED.3IONS-SCNC: 14 MMOL/L (ref 4–13)
AST SERPL-CCNC: 27 U/L (ref 7–45)
BASOPHILS # BLD AUTO: 0 10*3/MM3 (ref 0–0.2)
BASOPHILS NFR BLD AUTO: 0 % (ref 0–2)
BILIRUB SERPL-MCNC: 0.7 MG/DL (ref 0.1–1)
BILIRUB UR QL STRIP: NEGATIVE
BUN BLD-MCNC: 12 MG/DL (ref 5–21)
BUN/CREAT SERPL: 14.3 (ref 7–25)
CALCIUM SPEC-SCNC: 9.6 MG/DL (ref 8.4–10.4)
CHLORIDE SERPL-SCNC: 100 MMOL/L (ref 98–110)
CLARITY UR: CLEAR
CO2 SERPL-SCNC: 25 MMOL/L (ref 24–31)
COLOR UR: YELLOW
CREAT BLD-MCNC: 0.84 MG/DL (ref 0.5–1.4)
DEPRECATED RDW RBC AUTO: 43.8 FL (ref 40–54)
EOSINOPHIL # BLD AUTO: 0.01 10*3/MM3 (ref 0–0.7)
EOSINOPHIL NFR BLD AUTO: 0.1 % (ref 0–4)
ERYTHROCYTE [DISTWIDTH] IN BLOOD BY AUTOMATED COUNT: 14.4 % (ref 12–15)
FLUAV AG NPH QL: NEGATIVE
FLUBV AG NPH QL IA: NEGATIVE
GFR SERPL CREATININE-BSD FRML MDRD: 93 ML/MIN/1.73
GLOBULIN UR ELPH-MCNC: 3.8 GM/DL
GLUCOSE BLD-MCNC: 106 MG/DL (ref 70–100)
GLUCOSE UR STRIP-MCNC: NEGATIVE MG/DL
HCT VFR BLD AUTO: 41.4 % (ref 37–47)
HGB BLD-MCNC: 13.7 G/DL (ref 12–16)
HGB UR QL STRIP.AUTO: NEGATIVE
IMM GRANULOCYTES # BLD: 0.03 10*3/MM3 (ref 0–0.03)
IMM GRANULOCYTES NFR BLD: 0.3 % (ref 0–5)
KETONES UR QL STRIP: ABNORMAL
LEUKOCYTE ESTERASE UR QL STRIP.AUTO: NEGATIVE
LIPASE SERPL-CCNC: 120 U/L (ref 23–203)
LYMPHOCYTES # BLD AUTO: 0.69 10*3/MM3 (ref 0.72–4.86)
LYMPHOCYTES NFR BLD AUTO: 6.5 % (ref 15–45)
MCH RBC QN AUTO: 27.5 PG (ref 28–32)
MCHC RBC AUTO-ENTMCNC: 33.1 G/DL (ref 33–36)
MCV RBC AUTO: 83.1 FL (ref 82–98)
MONOCYTES # BLD AUTO: 0.23 10*3/MM3 (ref 0.19–1.3)
MONOCYTES NFR BLD AUTO: 2.2 % (ref 4–12)
NEUTROPHILS # BLD AUTO: 9.59 10*3/MM3 (ref 1.87–8.4)
NEUTROPHILS NFR BLD AUTO: 90.9 % (ref 39–78)
NITRITE UR QL STRIP: NEGATIVE
PH UR STRIP.AUTO: 8.5 [PH] (ref 5–8)
PLATELET # BLD AUTO: 299 10*3/MM3 (ref 130–400)
PMV BLD AUTO: 10.7 FL (ref 6–12)
POTASSIUM BLD-SCNC: 3.7 MMOL/L (ref 3.5–5.3)
PROT SERPL-MCNC: 8.2 G/DL (ref 6.3–8.7)
PROT UR QL STRIP: NEGATIVE
RBC # BLD AUTO: 4.98 10*6/MM3 (ref 4.2–5.4)
SODIUM BLD-SCNC: 139 MMOL/L (ref 135–145)
SP GR UR STRIP: 1.02 (ref 1–1.03)
UROBILINOGEN UR QL STRIP: ABNORMAL
WBC NRBC COR # BLD: 10.55 10*3/MM3 (ref 4.8–10.8)

## 2017-04-02 PROCEDURE — 25010000002 KETOROLAC TROMETHAMINE PER 15 MG: Performed by: NURSE PRACTITIONER

## 2017-04-02 PROCEDURE — 81003 URINALYSIS AUTO W/O SCOPE: CPT | Performed by: NURSE PRACTITIONER

## 2017-04-02 PROCEDURE — 83690 ASSAY OF LIPASE: CPT | Performed by: NURSE PRACTITIONER

## 2017-04-02 PROCEDURE — 25010000002 ONDANSETRON PER 1 MG: Performed by: NURSE PRACTITIONER

## 2017-04-02 PROCEDURE — 80053 COMPREHEN METABOLIC PANEL: CPT | Performed by: NURSE PRACTITIONER

## 2017-04-02 PROCEDURE — 87804 INFLUENZA ASSAY W/OPTIC: CPT | Performed by: NURSE PRACTITIONER

## 2017-04-02 PROCEDURE — 82150 ASSAY OF AMYLASE: CPT | Performed by: NURSE PRACTITIONER

## 2017-04-02 PROCEDURE — 36415 COLL VENOUS BLD VENIPUNCTURE: CPT | Performed by: NURSE PRACTITIONER

## 2017-04-02 PROCEDURE — 96374 THER/PROPH/DIAG INJ IV PUSH: CPT

## 2017-04-02 PROCEDURE — 71020 HC CHEST PA AND LATERAL: CPT

## 2017-04-02 PROCEDURE — 85025 COMPLETE CBC W/AUTO DIFF WBC: CPT | Performed by: NURSE PRACTITIONER

## 2017-04-02 PROCEDURE — 96361 HYDRATE IV INFUSION ADD-ON: CPT

## 2017-04-02 PROCEDURE — 99283 EMERGENCY DEPT VISIT LOW MDM: CPT

## 2017-04-02 PROCEDURE — 96375 TX/PRO/DX INJ NEW DRUG ADDON: CPT

## 2017-04-02 RX ORDER — ONDANSETRON 2 MG/ML
4 INJECTION INTRAMUSCULAR; INTRAVENOUS ONCE
Status: COMPLETED | OUTPATIENT
Start: 2017-04-02 | End: 2017-04-02

## 2017-04-02 RX ORDER — KETOROLAC TROMETHAMINE 15 MG/ML
15 INJECTION, SOLUTION INTRAMUSCULAR; INTRAVENOUS ONCE
Status: COMPLETED | OUTPATIENT
Start: 2017-04-02 | End: 2017-04-02

## 2017-04-02 RX ORDER — ONDANSETRON 4 MG/1
4 TABLET, ORALLY DISINTEGRATING ORAL EVERY 6 HOURS PRN
Qty: 12 TABLET | Refills: 0 | Status: SHIPPED | OUTPATIENT
Start: 2017-04-02 | End: 2017-10-13

## 2017-04-02 RX ORDER — OSELTAMIVIR PHOSPHATE 75 MG/1
75 CAPSULE ORAL EVERY 12 HOURS
Qty: 10 CAPSULE | Refills: 0 | Status: SHIPPED | OUTPATIENT
Start: 2017-04-02 | End: 2017-04-07

## 2017-04-02 RX ORDER — BENZONATATE 200 MG/1
200 CAPSULE ORAL 3 TIMES DAILY PRN
Qty: 15 CAPSULE | Refills: 0 | Status: SHIPPED | OUTPATIENT
Start: 2017-04-02 | End: 2017-10-13

## 2017-04-02 RX ORDER — METHYLPREDNISOLONE 4 MG/1
TABLET ORAL
Qty: 21 TABLET | Refills: 0 | Status: SHIPPED | OUTPATIENT
Start: 2017-04-02 | End: 2017-10-13

## 2017-04-02 RX ADMIN — ONDANSETRON HYDROCHLORIDE 4 MG: 2 SOLUTION INTRAMUSCULAR; INTRAVENOUS at 18:36

## 2017-04-02 RX ADMIN — KETOROLAC TROMETHAMINE 15 MG: 15 INJECTION, SOLUTION INTRAMUSCULAR; INTRAVENOUS at 19:53

## 2017-04-02 RX ADMIN — SODIUM CHLORIDE 1000 ML: 9 INJECTION, SOLUTION INTRAVENOUS at 19:52

## 2017-04-02 NOTE — ED NOTES
"Patient reports that she developed a cough and sore throat on Monday of last week. Patient reports that she has \"felt bad ever since,\" but today she began experiencing nausea, vomiting, diarrhea, a low-grade fever, and generalized body aches. Patient reports that she has thrown up 4 times today and has numerous episodes of diarrhea.      Bethany Ybarra, SCAR  04/02/17 [id number]    "

## 2017-04-03 NOTE — TELEPHONE ENCOUNTER
Juliana Mandy's mother called to let the office know that insurance requires a PA for the Aptiom and Trokendi. I did offer samples while I work on this and Mrs Pratt stated she will be here this afternoon to pick the samples up.

## 2017-04-05 ENCOUNTER — TELEPHONE (OUTPATIENT)
Dept: NEUROLOGY | Facility: CLINIC | Age: 37
End: 2017-04-05

## 2017-04-05 NOTE — TELEPHONE ENCOUNTER
Insurance called to let Keeley Bourne know that there is no prior authorization required for Trokendi or Aptiom. She explained that the Pharmacy was processing them incorrectly and that was the problem.

## 2017-04-08 NOTE — ED PROVIDER NOTES
Subjective   Patient is a 36 y.o. female presenting with general illness.   Illness   Severity:  Moderate  Onset quality:  Gradual  Progression:  Worsening  Chronicity:  New  Context:  Cough with congestion; body aches; nausea with vomiting  Associated symptoms: congestion, cough, headaches, myalgias, nausea, rhinorrhea and vomiting    Associated symptoms: no abdominal pain, no chest pain, no diarrhea, no ear pain, no fatigue, no fever, no rash, no shortness of breath, no sore throat and no wheezing        Review of Systems   Constitutional: Negative for appetite change, chills, fatigue and fever.   HENT: Positive for congestion and rhinorrhea. Negative for ear pain and sore throat.    Respiratory: Positive for cough. Negative for chest tightness, shortness of breath and wheezing.    Cardiovascular: Negative for chest pain and palpitations.   Gastrointestinal: Positive for nausea and vomiting. Negative for abdominal distention, abdominal pain and diarrhea.   Genitourinary: Negative for difficulty urinating and dysuria.   Musculoskeletal: Positive for myalgias. Negative for back pain, joint swelling, neck pain and neck stiffness.   Skin: Negative for rash.   Neurological: Positive for headaches. Negative for dizziness.   All other systems reviewed and are negative.      Past Medical History:   Diagnosis Date   • Cerebral aneurysm    • GERD (gastroesophageal reflux disease)    • H/O: hysterectomy    • Hypertension    • Seizure        Allergies   Allergen Reactions   • Ampicillin    • Extract Of Poison Ivy    • Latex    • Lortab  [Hydrocodone-Acetaminophen]    • Morphine    • Nitrofurantoin        Past Surgical History:   Procedure Laterality Date   • APPENDECTOMY     •  SECTION      x2   • CHOLECYSTECTOMY     • CRANIOTOMY     • ENDOMETRIAL ABLATION     • HYSTERECTOMY         Family History   Problem Relation Age of Onset   • Rheum arthritis Mother    • No Known Problems Brother    • No Known Problems Sister     • No Known Problems Daughter    • No Known Problems Brother    • No Known Problems Brother    • No Known Problems Brother    • No Known Problems Daughter        Social History     Social History   • Marital status: Single     Spouse name: N/A   • Number of children: N/A   • Years of education: N/A     Social History Main Topics   • Smoking status: Never Smoker   • Smokeless tobacco: Never Used   • Alcohol use No   • Drug use: No   • Sexual activity: Yes     Partners: Male     Other Topics Concern   • None     Social History Narrative   • None       Prior to Admission medications    Medication Sig Start Date End Date Taking? Authorizing Provider   albuterol (PROVENTIL HFA;VENTOLIN HFA) 108 (90 BASE) MCG/ACT inhaler Inhale into the lungs.   Yes Historical Provider, MD   Eslicarbazepine Acetate 400 MG tablet Take 400 mg by mouth Every Night. 3/16/17  Yes ARA Cruz   lacosamide (VIMPAT) 100 MG tablet tablet Take 1 tablet by mouth Every 12 (Twelve) Hours. 3/24/17  Yes ARA Cruz   omeprazole (priLOSEC) 20 MG capsule Take 20 mg by mouth daily.   Yes Nurse [facility], RN   topiramate (TOPAMAX) 100 MG tablet TAKE 1 TWICE A DAY 3/18/17  Yes Historical Provider, MD   Topiramate ER (TROKENDI XR) 200 MG capsule sustained-release 24 hr Take 200 mg by mouth Every Night. 3/16/17  Yes ARA Cruz   verapamil SR (CALAN-SR) 120 MG CR tablet Take 1 tablet by mouth Every Night. 2/13/17 2/13/18 Yes ARA Cruz   benzonatate (TESSALON) 200 MG capsule Take 1 capsule by mouth 3 (Three) Times a Day As Needed for Cough. 4/2/17   ARA Muse   Eslicarbazepine Acetate (APTIOM) 800 MG tablet Take 1 tablet by mouth Every Night. 3/16/17   ARA Cruz   ibuprofen (ADVIL,MOTRIN) 600 MG tablet Take  by mouth Every 6 (Six) Hours As Needed. 4/5/15   Historical Provider, MD   MethylPREDNISolone (MEDROL, NICHOLAS,) 4 MG tablet Take as directed on package instructions. 4/2/17   Charmaine  "ARA Soto   ondansetron ODT (ZOFRAN-ODT) 4 MG disintegrating tablet Take 1 tablet by mouth Every 6 (Six) Hours As Needed for Nausea or Vomiting. 4/2/17   ARA Muse   oseltamivir (TAMIFLU) 75 MG capsule Take 1 capsule by mouth Every 12 (Twelve) Hours for 5 days. 4/2/17 4/7/17  ARA Muse   polyethylene glycol (MIRALAX) powder Take 17 g by mouth daily.    Historical Provider, MD       Medications   sodium chloride 0.9 % bolus 1,000 mL (0 mL Intravenous Stopped 4/2/17 2100)   ondansetron (ZOFRAN) injection 4 mg (4 mg Intravenous Given 4/2/17 1836)   ketorolac (TORADOL) injection 15 mg (15 mg Intravenous Given 4/2/17 1953)       /65  Pulse 98  Temp 98.5 °F (36.9 °C) (Tympanic)   Resp 17  Ht 63\" (160 cm)  Wt 165 lb (74.8 kg)  SpO2 97%  BMI 29.23 kg/m2      Objective   Physical Exam   Constitutional: She is oriented to person, place, and time. She appears well-developed and well-nourished.   HENT:   Head: Normocephalic and atraumatic.   Right Ear: External ear normal.   Left Ear: External ear normal.   Nose: Nose normal.   Mouth/Throat: Oropharynx is clear and moist.   Eyes: Conjunctivae and EOM are normal. Pupils are equal, round, and reactive to light.   Neck: Normal range of motion. Neck supple.   Cardiovascular: Normal rate, regular rhythm, normal heart sounds and intact distal pulses.    Pulmonary/Chest: Effort normal. She has rales.   Abdominal: Soft. Bowel sounds are normal.   Musculoskeletal: Normal range of motion.   Neurological: She is alert and oriented to person, place, and time.   Skin: Skin is warm and dry.   Psychiatric: She has a normal mood and affect. Her behavior is normal. Judgment and thought content normal.   Nursing note and vitals reviewed.      Procedures         Lab Results (last 24 hours)     ** No results found for the last 24 hours. **          XR Chest 2 View   Final Result   1. No evidence of pneumonia.   2. Redemonstration of innumerable " calcifications throughout the lungs   bilaterally. These are indeterminate on this examination but are   unchanged since 2013.   This report was finalized on 04/02/2017 20:03 by Dr. Jimy Caicedo MD.            ED Course  ED Course   Discussed chest xray results and the need to f/u with pcp. Patient expressed understanding. Patient has had no vomiting while in the ER and tolerated PO challenge. We prescribed medications tessalon, medrol dose martin, zofran, and tamiflu. Clinically patient appears to have flu as numerous other patients that we have been evaluating in the ER. We will treat accordingly and recommend patient to f/u with pcp for re-evaluation- return with any acute or worsening sxs.        MDM  Number of Diagnoses or Management Options  Viral syndrome: new and requires workup     Amount and/or Complexity of Data Reviewed  Clinical lab tests: ordered and reviewed  Tests in the radiology section of CPT®: ordered and reviewed  Discuss the patient with other providers: yes    Risk of Complications, Morbidity, and/or Mortality  Presenting problems: moderate  Diagnostic procedures: moderate  Management options: moderate    Patient Progress  Patient progress: improved      Final diagnoses:   Viral syndrome          Charmaine Bowman, APRN  04/08/17 [id number]

## 2017-04-18 ENCOUNTER — TRANSCRIBE ORDERS (OUTPATIENT)
Dept: ADMINISTRATIVE | Facility: HOSPITAL | Age: 37
End: 2017-04-18

## 2017-04-18 DIAGNOSIS — R10.9 ABDOMINAL PAIN, UNSPECIFIED LOCATION: Primary | ICD-10-CM

## 2017-04-19 ENCOUNTER — HOSPITAL ENCOUNTER (OUTPATIENT)
Dept: GENERAL RADIOLOGY | Facility: HOSPITAL | Age: 37
Discharge: HOME | End: 2017-04-19

## 2017-04-19 ENCOUNTER — HOSPITAL ENCOUNTER (OUTPATIENT)
Dept: GENERAL RADIOLOGY | Facility: HOSPITAL | Age: 37
Discharge: HOME | End: 2017-04-19
Attending: INTERNAL MEDICINE | Admitting: INTERNAL MEDICINE

## 2017-04-19 DIAGNOSIS — R10.9 ABDOMINAL PAIN, UNSPECIFIED LOCATION: ICD-10-CM

## 2017-04-19 DIAGNOSIS — R10.9 PAIN IN THE ABDOMEN: ICD-10-CM

## 2017-04-19 PROCEDURE — 74020 HC XR ABDOMEN FLAT & UPRIGHT: CPT

## 2017-04-20 ENCOUNTER — APPOINTMENT (OUTPATIENT)
Dept: GENERAL RADIOLOGY | Facility: HOSPITAL | Age: 37
End: 2017-04-20
Attending: INTERNAL MEDICINE

## 2017-04-20 ENCOUNTER — HOSPITAL ENCOUNTER (OUTPATIENT)
Dept: GENERAL RADIOLOGY | Facility: HOSPITAL | Age: 37
Discharge: HOME | End: 2017-04-20
Attending: INTERNAL MEDICINE | Admitting: INTERNAL MEDICINE

## 2017-04-20 PROCEDURE — 74246 X-RAY XM UPR GI TRC 2CNTRST: CPT

## 2017-04-25 ENCOUNTER — OFFICE VISIT (OUTPATIENT)
Dept: GASTROENTEROLOGY | Age: 37
End: 2017-04-25
Payer: MEDICARE

## 2017-04-25 VITALS
HEIGHT: 63 IN | HEART RATE: 78 BPM | WEIGHT: 164 LBS | OXYGEN SATURATION: 94 % | DIASTOLIC BLOOD PRESSURE: 80 MMHG | SYSTOLIC BLOOD PRESSURE: 138 MMHG | BODY MASS INDEX: 29.06 KG/M2

## 2017-04-25 DIAGNOSIS — R14.2 BELCHING: ICD-10-CM

## 2017-04-25 DIAGNOSIS — R19.7 DIARRHEA, UNSPECIFIED TYPE: ICD-10-CM

## 2017-04-25 DIAGNOSIS — R12 CHRONIC HEARTBURN: ICD-10-CM

## 2017-04-25 DIAGNOSIS — R10.84 GENERALIZED ABDOMINAL PAIN: ICD-10-CM

## 2017-04-25 DIAGNOSIS — K62.5 RECTAL BLEEDING: ICD-10-CM

## 2017-04-25 DIAGNOSIS — R11.2 NAUSEA WITH VOMITING, UNSPECIFIED: Primary | ICD-10-CM

## 2017-04-25 PROCEDURE — 99214 OFFICE O/P EST MOD 30 MIN: CPT | Performed by: NURSE PRACTITIONER

## 2017-04-25 PROCEDURE — G8427 DOCREV CUR MEDS BY ELIG CLIN: HCPCS | Performed by: NURSE PRACTITIONER

## 2017-04-25 PROCEDURE — G8419 CALC BMI OUT NRM PARAM NOF/U: HCPCS | Performed by: NURSE PRACTITIONER

## 2017-04-25 PROCEDURE — 1036F TOBACCO NON-USER: CPT | Performed by: NURSE PRACTITIONER

## 2017-04-25 ASSESSMENT — ENCOUNTER SYMPTOMS
COUGH: 0
CONSTIPATION: 0
NAUSEA: 1
DIARRHEA: 1
SORE THROAT: 0
CHEST TIGHTNESS: 0
BLOOD IN STOOL: 1
VOMITING: 1
ABDOMINAL PAIN: 1
BACK PAIN: 1
ABDOMINAL DISTENTION: 0
RECTAL PAIN: 0
SHORTNESS OF BREATH: 0
VOICE CHANGE: 0

## 2017-05-05 ENCOUNTER — ANESTHESIA EVENT (OUTPATIENT)
Dept: OPERATING ROOM | Age: 37
End: 2017-05-05

## 2017-05-12 ENCOUNTER — ANESTHESIA (OUTPATIENT)
Dept: OPERATING ROOM | Age: 37
End: 2017-05-12

## 2017-05-12 ENCOUNTER — HOSPITAL ENCOUNTER (OUTPATIENT)
Age: 37
Setting detail: OUTPATIENT SURGERY
Discharge: HOME | End: 2017-05-12
Attending: INTERNAL MEDICINE | Admitting: INTERNAL MEDICINE
Payer: MEDICARE

## 2017-05-12 VITALS
WEIGHT: 170 LBS | BODY MASS INDEX: 30.12 KG/M2 | TEMPERATURE: 98.2 F | SYSTOLIC BLOOD PRESSURE: 100 MMHG | RESPIRATION RATE: 18 BRPM | OXYGEN SATURATION: 96 % | HEIGHT: 63 IN | HEART RATE: 91 BPM | DIASTOLIC BLOOD PRESSURE: 62 MMHG

## 2017-05-12 VITALS — OXYGEN SATURATION: 97 % | DIASTOLIC BLOOD PRESSURE: 72 MMHG | SYSTOLIC BLOOD PRESSURE: 111 MMHG

## 2017-05-12 PROCEDURE — G8907 PT DOC NO EVENTS ON DISCHARG: HCPCS

## 2017-05-12 PROCEDURE — 45378 DIAGNOSTIC COLONOSCOPY: CPT | Performed by: INTERNAL MEDICINE

## 2017-05-12 PROCEDURE — 45378 DIAGNOSTIC COLONOSCOPY: CPT

## 2017-05-12 PROCEDURE — G8918 PT W/O PREOP ORDER IV AB PRO: HCPCS

## 2017-05-12 RX ORDER — MIDAZOLAM HYDROCHLORIDE 1 MG/ML
INJECTION INTRAMUSCULAR; INTRAVENOUS PRN
Status: DISCONTINUED | OUTPATIENT
Start: 2017-05-12 | End: 2017-05-12 | Stop reason: SDUPTHER

## 2017-05-12 RX ORDER — PROPOFOL 10 MG/ML
INJECTION, EMULSION INTRAVENOUS PRN
Status: DISCONTINUED | OUTPATIENT
Start: 2017-05-12 | End: 2017-05-12 | Stop reason: SDUPTHER

## 2017-05-12 RX ORDER — ONDANSETRON 2 MG/ML
4 INJECTION INTRAMUSCULAR; INTRAVENOUS
Status: DISCONTINUED | OUTPATIENT
Start: 2017-05-12 | End: 2017-05-12 | Stop reason: HOSPADM

## 2017-05-12 RX ORDER — SODIUM CHLORIDE, SODIUM LACTATE, POTASSIUM CHLORIDE, CALCIUM CHLORIDE 600; 310; 30; 20 MG/100ML; MG/100ML; MG/100ML; MG/100ML
INJECTION, SOLUTION INTRAVENOUS CONTINUOUS
Status: DISCONTINUED | OUTPATIENT
Start: 2017-05-12 | End: 2017-05-12 | Stop reason: HOSPADM

## 2017-05-12 RX ORDER — PROMETHAZINE HYDROCHLORIDE 25 MG/ML
6.25 INJECTION, SOLUTION INTRAMUSCULAR; INTRAVENOUS
Status: DISCONTINUED | OUTPATIENT
Start: 2017-05-12 | End: 2017-05-12 | Stop reason: HOSPADM

## 2017-05-12 RX ORDER — DIPHENHYDRAMINE HYDROCHLORIDE 50 MG/ML
12.5 INJECTION INTRAMUSCULAR; INTRAVENOUS
Status: DISCONTINUED | OUTPATIENT
Start: 2017-05-12 | End: 2017-05-12 | Stop reason: HOSPADM

## 2017-05-12 RX ORDER — LIDOCAINE HYDROCHLORIDE 10 MG/ML
1 INJECTION, SOLUTION EPIDURAL; INFILTRATION; INTRACAUDAL; PERINEURAL
Status: COMPLETED | OUTPATIENT
Start: 2017-05-12 | End: 2017-05-12

## 2017-05-12 RX ADMIN — MIDAZOLAM HYDROCHLORIDE 2 MG: 1 INJECTION INTRAMUSCULAR; INTRAVENOUS at 12:40

## 2017-05-12 RX ADMIN — LIDOCAINE HYDROCHLORIDE 5 ML: 10 INJECTION, SOLUTION EPIDURAL; INFILTRATION; INTRACAUDAL; PERINEURAL at 12:40

## 2017-05-12 RX ADMIN — SODIUM CHLORIDE, SODIUM LACTATE, POTASSIUM CHLORIDE, CALCIUM CHLORIDE: 600; 310; 30; 20 INJECTION, SOLUTION INTRAVENOUS at 11:57

## 2017-05-12 RX ADMIN — PROPOFOL 250 MG: 10 INJECTION, EMULSION INTRAVENOUS at 12:40

## 2017-05-19 PROBLEM — K90.9 MALABSORPTION OF IRON: Status: ACTIVE | Noted: 2017-05-19

## 2017-05-22 ENCOUNTER — INFUSION (OUTPATIENT)
Dept: ONCOLOGY | Facility: HOSPITAL | Age: 37
End: 2017-05-22

## 2017-05-22 ENCOUNTER — ANESTHESIA EVENT (OUTPATIENT)
Dept: OPERATING ROOM | Age: 37
End: 2017-05-22

## 2017-05-22 VITALS
OXYGEN SATURATION: 98 % | SYSTOLIC BLOOD PRESSURE: 119 MMHG | HEART RATE: 99 BPM | HEIGHT: 63 IN | BODY MASS INDEX: 30.12 KG/M2 | RESPIRATION RATE: 18 BRPM | TEMPERATURE: 98.8 F | DIASTOLIC BLOOD PRESSURE: 74 MMHG | WEIGHT: 170 LBS

## 2017-05-22 DIAGNOSIS — K90.9 MALABSORPTION OF IRON: Primary | ICD-10-CM

## 2017-05-22 PROCEDURE — 96374 THER/PROPH/DIAG INJ IV PUSH: CPT

## 2017-05-22 PROCEDURE — 96365 THER/PROPH/DIAG IV INF INIT: CPT

## 2017-05-22 PROCEDURE — 25010000002 FERUMOXYTOL 510 MG/17ML SOLUTION 510 MG VIAL: Performed by: INTERNAL MEDICINE

## 2017-05-22 RX ORDER — SODIUM CHLORIDE 9 MG/ML
250 INJECTION, SOLUTION INTRAVENOUS ONCE
Status: COMPLETED | OUTPATIENT
Start: 2017-05-22 | End: 2017-05-22

## 2017-05-22 RX ADMIN — FERUMOXYTOL 510 MG: 510 INJECTION INTRAVENOUS at 09:00

## 2017-05-22 RX ADMIN — SODIUM CHLORIDE 250 ML: 9 INJECTION, SOLUTION INTRAVENOUS at 08:55

## 2017-05-24 ENCOUNTER — HOSPITAL ENCOUNTER (OUTPATIENT)
Age: 37
Setting detail: OUTPATIENT SURGERY
Discharge: HOME | End: 2017-05-24
Attending: INTERNAL MEDICINE | Admitting: INTERNAL MEDICINE
Payer: MEDICARE

## 2017-05-24 ENCOUNTER — HOSPITAL ENCOUNTER (OUTPATIENT)
Age: 37
Setting detail: SPECIMEN
Discharge: HOME | End: 2017-05-24
Payer: MEDICARE

## 2017-05-24 ENCOUNTER — ANESTHESIA (OUTPATIENT)
Dept: OPERATING ROOM | Age: 37
End: 2017-05-24
Payer: MEDICARE

## 2017-05-24 VITALS
BODY MASS INDEX: 30.12 KG/M2 | OXYGEN SATURATION: 96 % | TEMPERATURE: 97.7 F | SYSTOLIC BLOOD PRESSURE: 116 MMHG | WEIGHT: 170 LBS | HEART RATE: 86 BPM | HEIGHT: 63 IN | RESPIRATION RATE: 18 BRPM | DIASTOLIC BLOOD PRESSURE: 81 MMHG

## 2017-05-24 VITALS — OXYGEN SATURATION: 97 % | SYSTOLIC BLOOD PRESSURE: 126 MMHG | DIASTOLIC BLOOD PRESSURE: 91 MMHG

## 2017-05-24 PROCEDURE — G8918 PT W/O PREOP ORDER IV AB PRO: HCPCS

## 2017-05-24 PROCEDURE — 88305 TISSUE EXAM BY PATHOLOGIST: CPT

## 2017-05-24 PROCEDURE — 43239 EGD BIOPSY SINGLE/MULTIPLE: CPT | Performed by: INTERNAL MEDICINE

## 2017-05-24 PROCEDURE — G8907 PT DOC NO EVENTS ON DISCHARG: HCPCS

## 2017-05-24 PROCEDURE — 43239 EGD BIOPSY SINGLE/MULTIPLE: CPT

## 2017-05-24 PROCEDURE — 00740 PR ANESTH,UGI ENDOSCOPY: CPT | Performed by: NURSE ANESTHETIST, CERTIFIED REGISTERED

## 2017-05-24 PROCEDURE — 87077 CULTURE AEROBIC IDENTIFY: CPT

## 2017-05-24 RX ORDER — ONDANSETRON 2 MG/ML
4 INJECTION INTRAMUSCULAR; INTRAVENOUS
Status: DISCONTINUED | OUTPATIENT
Start: 2017-05-24 | End: 2017-05-24 | Stop reason: HOSPADM

## 2017-05-24 RX ORDER — DIPHENHYDRAMINE HYDROCHLORIDE 50 MG/ML
12.5 INJECTION INTRAMUSCULAR; INTRAVENOUS
Status: DISCONTINUED | OUTPATIENT
Start: 2017-05-24 | End: 2017-05-24 | Stop reason: HOSPADM

## 2017-05-24 RX ORDER — LIDOCAINE HYDROCHLORIDE 10 MG/ML
1 INJECTION, SOLUTION EPIDURAL; INFILTRATION; INTRACAUDAL; PERINEURAL
Status: DISCONTINUED | OUTPATIENT
Start: 2017-05-24 | End: 2017-05-24 | Stop reason: HOSPADM

## 2017-05-24 RX ORDER — SODIUM CHLORIDE, SODIUM LACTATE, POTASSIUM CHLORIDE, CALCIUM CHLORIDE 600; 310; 30; 20 MG/100ML; MG/100ML; MG/100ML; MG/100ML
INJECTION, SOLUTION INTRAVENOUS CONTINUOUS
Status: DISCONTINUED | OUTPATIENT
Start: 2017-05-24 | End: 2017-05-24 | Stop reason: HOSPADM

## 2017-05-24 RX ORDER — HYDRALAZINE HYDROCHLORIDE 20 MG/ML
5 INJECTION INTRAMUSCULAR; INTRAVENOUS EVERY 10 MIN PRN
Status: DISCONTINUED | OUTPATIENT
Start: 2017-05-24 | End: 2017-05-24 | Stop reason: HOSPADM

## 2017-05-24 RX ORDER — PROMETHAZINE HYDROCHLORIDE 25 MG/ML
12.5 INJECTION, SOLUTION INTRAMUSCULAR; INTRAVENOUS
Status: DISCONTINUED | OUTPATIENT
Start: 2017-05-24 | End: 2017-05-24 | Stop reason: HOSPADM

## 2017-05-24 RX ORDER — PROPOFOL 10 MG/ML
INJECTION, EMULSION INTRAVENOUS PRN
Status: DISCONTINUED | OUTPATIENT
Start: 2017-05-24 | End: 2017-05-24 | Stop reason: SDUPTHER

## 2017-05-24 RX ORDER — MEPERIDINE HYDROCHLORIDE 25 MG/ML
12.5 INJECTION INTRAMUSCULAR; INTRAVENOUS; SUBCUTANEOUS EVERY 5 MIN PRN
Status: DISCONTINUED | OUTPATIENT
Start: 2017-05-24 | End: 2017-05-24 | Stop reason: HOSPADM

## 2017-05-24 RX ORDER — LABETALOL HYDROCHLORIDE 5 MG/ML
5 INJECTION, SOLUTION INTRAVENOUS EVERY 10 MIN PRN
Status: DISCONTINUED | OUTPATIENT
Start: 2017-05-24 | End: 2017-05-24 | Stop reason: HOSPADM

## 2017-05-24 RX ADMIN — SODIUM CHLORIDE, SODIUM LACTATE, POTASSIUM CHLORIDE, CALCIUM CHLORIDE: 600; 310; 30; 20 INJECTION, SOLUTION INTRAVENOUS at 10:39

## 2017-05-24 RX ADMIN — PROPOFOL 150 MG: 10 INJECTION, EMULSION INTRAVENOUS at 10:57

## 2017-05-25 LAB — CLOTEST: NEGATIVE

## 2017-05-30 ENCOUNTER — INFUSION (OUTPATIENT)
Dept: ONCOLOGY | Facility: HOSPITAL | Age: 37
End: 2017-05-30

## 2017-05-30 VITALS
HEIGHT: 64 IN | HEART RATE: 93 BPM | OXYGEN SATURATION: 99 % | SYSTOLIC BLOOD PRESSURE: 110 MMHG | RESPIRATION RATE: 16 BRPM | DIASTOLIC BLOOD PRESSURE: 77 MMHG | TEMPERATURE: 97.2 F | WEIGHT: 171 LBS | BODY MASS INDEX: 29.19 KG/M2

## 2017-05-30 DIAGNOSIS — K90.9 MALABSORPTION OF IRON: Primary | ICD-10-CM

## 2017-05-30 PROCEDURE — 96374 THER/PROPH/DIAG INJ IV PUSH: CPT

## 2017-05-30 PROCEDURE — 25010000002 FERUMOXYTOL 510 MG/17ML SOLUTION 510 MG VIAL: Performed by: INTERNAL MEDICINE

## 2017-05-30 RX ADMIN — FERUMOXYTOL 510 MG: 510 INJECTION INTRAVENOUS at 10:14

## 2017-10-13 ENCOUNTER — APPOINTMENT (OUTPATIENT)
Dept: GENERAL RADIOLOGY | Facility: HOSPITAL | Age: 37
End: 2017-10-13
Attending: FAMILY MEDICINE

## 2017-10-13 PROCEDURE — 73610 X-RAY EXAM OF ANKLE: CPT

## 2017-12-01 ENCOUNTER — HOSPITAL ENCOUNTER (OUTPATIENT)
Dept: GENERAL RADIOLOGY | Facility: HOSPITAL | Age: 37
Discharge: HOME | End: 2017-12-01
Admitting: CLINICAL NURSE SPECIALIST

## 2017-12-01 ENCOUNTER — OFFICE VISIT (OUTPATIENT)
Dept: NEUROLOGY | Facility: CLINIC | Age: 37
End: 2017-12-01

## 2017-12-01 VITALS
DIASTOLIC BLOOD PRESSURE: 82 MMHG | SYSTOLIC BLOOD PRESSURE: 124 MMHG | RESPIRATION RATE: 18 BRPM | HEIGHT: 63 IN | WEIGHT: 180 LBS | BODY MASS INDEX: 31.89 KG/M2

## 2017-12-01 DIAGNOSIS — M54.41 CHRONIC MIDLINE LOW BACK PAIN WITH RIGHT-SIDED SCIATICA: ICD-10-CM

## 2017-12-01 DIAGNOSIS — Z98.890 S/P CRANIOTOMY: ICD-10-CM

## 2017-12-01 DIAGNOSIS — R51.9 GENERALIZED HEADACHES: ICD-10-CM

## 2017-12-01 DIAGNOSIS — Q28.2 CEREBRAL AVM: Primary | ICD-10-CM

## 2017-12-01 DIAGNOSIS — G89.29 CHRONIC MIDLINE LOW BACK PAIN WITH RIGHT-SIDED SCIATICA: ICD-10-CM

## 2017-12-01 DIAGNOSIS — G40.109 LOCALIZATION-RELATED SYMPTOMATIC EPILEPSY AND EPILEPTIC SYNDROMES WITH SIMPLE PARTIAL SEIZURES, NOT INTRACTABLE, WITHOUT STATUS EPILEPTICUS (HCC): ICD-10-CM

## 2017-12-01 PROCEDURE — 99214 OFFICE O/P EST MOD 30 MIN: CPT | Performed by: CLINICAL NURSE SPECIALIST

## 2017-12-01 PROCEDURE — 72114 X-RAY EXAM L-S SPINE BENDING: CPT

## 2017-12-01 RX ORDER — LACOSAMIDE 150 MG/1
150 TABLET ORAL 2 TIMES DAILY
Qty: 60 TABLET | Refills: 2 | Status: SHIPPED | OUTPATIENT
Start: 2017-12-01 | End: 2018-08-06

## 2017-12-01 RX ORDER — VERAPAMIL HYDROCHLORIDE 120 MG/1
120 CAPSULE, EXTENDED RELEASE ORAL
COMMUNITY
End: 2018-08-06

## 2017-12-01 RX ORDER — LACOSAMIDE 100 MG/1
100 TABLET ORAL
COMMUNITY
End: 2017-12-01 | Stop reason: DRUGHIGH

## 2017-12-01 NOTE — PATIENT INSTRUCTIONS
Epilepsy  Epilepsy is a disorder in which a person has repeated seizures over time. A seizure is a release of abnormal electrical activity in the brain. Seizures can cause a change in attention, behavior, or the ability to remain awake and alert (altered mental status). Seizures often involve uncontrollable shaking (convulsions).   Most people with epilepsy lead normal lives. However, people with epilepsy are at an increased risk of falls, accidents, and injuries. Therefore, it is important to begin treatment right away.  CAUSES   Epilepsy has many possible causes. Anything that disturbs the normal pattern of brain cell activity can lead to seizures. This may include:   · Head injury.  · Birth trauma.  · High fever as a child.  · Stroke.  · Bleeding into or around the brain.  · Certain drugs.  · Prolonged low oxygen, such as what occurs after CPR efforts.  · Abnormal brain development.  · Certain illnesses, such as meningitis, encephalitis (brain infection), malaria, and other infections.  · An imbalance of nerve signaling chemicals (neurotransmitters).    SIGNS AND SYMPTOMS   The symptoms of a seizure can vary greatly from one person to another. Right before a seizure, you may have a warning (aura) that a seizure is about to occur. An aura may include the following symptoms:  · Fear or anxiety.  · Nausea.  · Feeling like the room is spinning (vertigo).  · Vision changes, such as seeing flashing lights or spots.  Common symptoms during a seizure include:  · Abnormal sensations, such as an abnormal smell or a bitter taste in the mouth.    · Sudden, general body stiffness.    · Convulsions that involve rhythmic jerking of the face, arm, or leg on one or both sides.    · Sudden change in consciousness.      Appearing to be awake but not responding.      Appearing to be asleep but cannot be awakened.    · Grimacing, chewing, lip smacking, drooling, tongue biting, or loss of bowel or bladder control.  After a seizure,  you may feel sleepy for a while.   DIAGNOSIS   Your health care provider will ask about your symptoms and take a medical history. Descriptions from any witnesses to your seizures will be very helpful in the diagnosis. A physical exam, including a detailed neurological exam, is necessary. Various tests may be done, such as:   · An electroencephalogram (EEG). This is a painless test of your brain waves. In this test, a diagram is created of your brain waves. These diagrams can be interpreted by a specialist.  · An MRI of the brain.    · A CT scan of the brain.    · A spinal tap (lumbar puncture, LP).  · Blood tests to check for signs of infection or abnormal blood chemistry.  TREATMENT   There is no cure for epilepsy, but it is generally treatable. Once epilepsy is diagnosed, it is important to begin treatment as soon as possible. For most people with epilepsy, seizures can be controlled with medicines. The following may also be used:  · A pacemaker for the brain (vagus nerve stimulator) can be used for people with seizures that are not well controlled by medicine.  · Surgery on the brain.  For some people, epilepsy eventually goes away.  HOME CARE INSTRUCTIONS   ·  Follow your health care provider's recommendations on driving and safety in normal activities.  · Get enough rest. Lack of sleep can cause seizures.  · Only take over-the-counter or prescription medicines as directed by your health care provider. Take any prescribed medicine exactly as directed.  · Avoid any known triggers of your seizures.  · Keep a seizure diary. Record what you recall about any seizure, especially any possible trigger.    · Make sure the people you live and work with know that you are prone to seizures. They should receive instructions on how to help you. In general, a witness to a seizure should:      Cushion your head and body.      Turn you on your side.      Avoid unnecessarily restraining you.      Not place anything inside your  mouth.      Call for emergency medical help if there is any question about what has occurred.    · Follow up with your health care provider as directed. You may need regular blood tests to monitor the levels of your medicine.    SEEK MEDICAL CARE IF:   · You develop signs of infection or other illness. This might increase the risk of a seizure.    · You seem to be having more frequent seizures.    · Your seizure pattern is changing.    SEEK IMMEDIATE MEDICAL CARE IF:   · You have a seizure that does not stop after a few moments.    · You have a seizure that causes any difficulty in breathing.    · You have a seizure that results in a very severe headache.    · You have a seizure that leaves you with the inability to speak or use a part of your body.       This information is not intended to replace advice given to you by your health care provider. Make sure you discuss any questions you have with your health care provider.     Document Released: 12/18/2006 Document Revised: 04/10/2017 Document Reviewed: 07/30/2014  [employer] Interactive Patient Education ©2017 [employer]  Stroke Prevention  Some medical conditions and behaviors are associated with an increased chance of having a stroke. You may prevent a stroke by making healthy choices and managing medical conditions.  HOW CAN I REDUCE MY RISK OF HAVING A STROKE?   · Stay physically active. Get at least 30 minutes of activity on most or all days.  · Do not smoke. It may also be helpful to avoid exposure to secondhand smoke.  · Limit alcohol use. Moderate alcohol use is considered to be:  ¨ No more than 2 drinks per day for men.  ¨ No more than 1 drink per day for nonpregnant women.  · Eat healthy foods. This involves:  ¨ Eating 5 or more servings of fruits and vegetables a day.  ¨ Making dietary changes that address high blood pressure (hypertension), high cholesterol, diabetes, or obesity.  · Manage your cholesterol levels.  ¨ Making food choices that are high  in fiber and low in saturated fat, trans fat, and cholesterol may control cholesterol levels.  ¨ Take any prescribed medicines to control cholesterol as directed by your health care provider.  · Manage your diabetes.  ¨ Controlling your carbohydrate and sugar intake is recommended to manage diabetes.  ¨ Take any prescribed medicines to control diabetes as directed by your health care provider.  · Control your hypertension.  ¨ Making food choices that are low in salt (sodium), saturated fat, trans fat, and cholesterol is recommended to manage hypertension.  ¨ Ask your health care provider if you need treatment to lower your blood pressure. Take any prescribed medicines to control hypertension as directed by your health care provider.  ¨ If you are 18-39 years of age, have your blood pressure checked every 3-5 years. If you are 40 years of age or older, have your blood pressure checked every year.  · Maintain a healthy weight.  ¨ Reducing calorie intake and making food choices that are low in sodium, saturated fat, trans fat, and cholesterol are recommended to manage weight.  · Stop drug abuse.  · Avoid taking birth control pills.  ¨ Talk to your health care provider about the risks of taking birth control pills if you are over 35 years old, smoke, get migraines, or have ever had a blood clot.  · Get evaluated for sleep disorders (sleep apnea).  ¨ Talk to your health care provider about getting a sleep evaluation if you snore a lot or have excessive sleepiness.  · Take medicines only as directed by your health care provider.  ¨ For some people, aspirin or blood thinners (anticoagulants) are helpful in reducing the risk of forming abnormal blood clots that can lead to stroke. If you have the irregular heart rhythm of atrial fibrillation, you should be on a blood thinner unless there is a good reason you cannot take them.  ¨ Understand all your medicine instructions.  · Make sure that other conditions (such as anemia or  atherosclerosis) are addressed.  SEEK IMMEDIATE MEDICAL CARE IF:   · You have sudden weakness or numbness of the face, arm, or leg, especially on one side of the body.  · Your face or eyelid droops to one side.  · You have sudden confusion.  · You have trouble speaking (aphasia) or understanding.  · You have sudden trouble seeing in one or both eyes.  · You have sudden trouble walking.  · You have dizziness.  · You have a loss of balance or coordination.  · You have a sudden, severe headache with no known cause.  · You have new chest pain or an irregular heartbeat.  Any of these symptoms may represent a serious problem that is an emergency. Do not wait to see if the symptoms will go away. Get medical help at once. Call your local emergency services (911 in [place] Do not drive yourself to the hospital.     This information is not intended to replace advice given to you by your health care provider. Make sure you discuss any questions you have with your health care provider.     Document Released: 01/25/2006 Document Revised: 01/08/2016 Document Reviewed: 06/20/2014  [employer] Interactive Patient Education ©2017 [employer]  BMI for Adults  Body mass index (BMI) is a number that is calculated from a person's weight and height. In most adults, the number is used to find how much of an adult's weight is made up of fat. BMI is not as accurate as a direct measure of body fat.  HOW IS BMI CALCULATED?  BMI is calculated by dividing weight in kilograms by height in meters squared. It can also be calculated by dividing weight in pounds by height in inches squared, then multiplying the resulting number by 703. Charts are available to help you find your BMI quickly and easily without doing this calculation.   HOW IS BMI INTERPRETED?  Health care professionals use BMI charts to identify whether an adult is underweight, at a normal weight, or overweight based on the following guidelines:  · Underweight: BMI less than  18.5.  · Normal weight: BMI between 18.5 and 24.9.  · Overweight: BMI between 25 and 29.9.  · Obese: BMI of 30 and above.  BMI is usually interpreted the same for males and females.  Weight includes both fat and muscle, so someone with a muscular build, such as an athlete, may have a BMI that is higher than 24.9. In cases like these, BMI may not accurately depict body fat. To determine if excess body fat is the cause of a BMI of 25 or higher, further assessments may need to be done by a health care provider.  WHY IS BMI A USEFUL TOOL?  BMI is used to identify a possible weight problem that may be related to a medical problem or may increase the risk for medical problems. BMI can also be used to promote changes to reach a healthy weight.     This information is not intended to replace advice given to you by your health care provider. Make sure you discuss any questions you have with your health care provider.     Document Released: 08/29/2005 Document Revised: 01/08/2016 Document Reviewed: 05/15/2015  [employer] Interactive Patient Education ©2017 [employer]

## 2017-12-01 NOTE — PROGRESS NOTES
Subjective     Chief Complaint   Patient presents with   • Neurologic Problem     F/u of seizures and headaches.  Pt was last seen in March.  Pt states that she has started having headaches again.  She is having 3-4 per week.         .  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  She is here today for follow up for HA and seizure. She was last seen 3/17.  Since last visit she had stopped Aptiom in May 2017 as she was having severe constipation with bleeding. She did not call and inform me of the side effects and that it was stopped. Since stopping she has noticed increase frequency of HAs and left facial tic and drawing. She also takes Trokendi  mg and vimpat 100 mg BID  and denies side effects. She denies missing doses.     As you recall she does have hx of AVM with clipping and does have bilateral lower visual field loss.     She does have a new complaint of low back pain that has been constant. She has received pain injections in the past but that was several years. She also seen chiropractor in the past but not recently. Symptoms are described below. Pain is aggravated when she needs to bend to draw blood. Worse when lying flat. She has tried sleeping on her side with pillow between legs. She has not done PT.     HPI Comments: Seizure/spell on 2/15/17:  Was not feeling well that morning was having involuntary tremor and had not started the Trokendi or Aptiom  Did not lose consciousness, but did stare off  Lasted about 3-4 minutes  Still working nights so taking in the morning when going to bed in the morning  No tongue biting, incontinence.       Seizures    This is a chronic problem. Episode onset: August 2016. Associated symptoms include sleepiness, confusion, headaches, nausea, vomiting and muscle weakness (soreness). Pertinent negatives include no sore throat, no chest pain, no cough and no diarrhea. Characteristics include rhythmic jerking and loss of consciousness.  Characteristics do not include bowel incontinence or bit tongue. staring spelss, left lip twitch, during then night   Headache    This is a chronic problem. The current episode started more than 1 year ago. Episode frequency: 2/week. The problem has been unchanged. The pain is located in the bilateral and frontal (wraps around head) region. Associated symptoms include back pain, dizziness, nausea, phonophobia, photophobia, seizures, tingling (left side of face) and vomiting. Pertinent negatives include no coughing, fever, sinus pressure, sore throat, tinnitus or weakness. The symptoms are aggravated by food. Treatments tried: verapamil , topamax. The treatment provided significant relief.   Back Pain   This is a chronic problem. Episode onset: 2015. The problem occurs daily. The problem has been gradually worsening since onset. The pain is present in the lumbar spine. The quality of the pain is described as stabbing. Radiates to: radiates down lateral right  leg. The pain is moderate. The pain is worse during the night. The symptoms are aggravated by lying down, bending and sitting. Associated symptoms include headaches and tingling (left side of face). Pertinent negatives include no bowel incontinence, chest pain, dysuria, fever, paresthesias or weakness. Treatments tried: heat, aleve, chiropractor. The treatment provided mild relief.        Current Outpatient Prescriptions   Medication Sig Dispense Refill   • albuterol (PROVENTIL HFA;VENTOLIN HFA) 108 (90 BASE) MCG/ACT inhaler Inhale into the lungs.     • naproxen sodium (ALEVE) 220 MG tablet Take 440 mg by mouth 2 (Two) Times a Day As Needed for Mild Pain .     • Topiramate ER (TROKENDI XR) 200 MG capsule sustained-release 24 hr Take 200 mg by mouth Every Night. 30 capsule 6   • Lacosamide (VIMPAT) 150 MG tablet Take 150 mg by mouth 2 (Two) Times a Day. 60 tablet 2   • verapamil PM (VERELAN PM) 120 MG 24 hr capsule Take 120 mg by mouth.       No current  "facility-administered medications for this visit.        Past Medical History:   Diagnosis Date   • Cerebral aneurysm    • GERD (gastroesophageal reflux disease)    • H/O: hysterectomy    • Hypertension    • Iron deficiency anemia    • Seizure        Past Surgical History:   Procedure Laterality Date   • APPENDECTOMY     •  SECTION      x2   • CHOLECYSTECTOMY     • COLONOSCOPY     • CRANIOTOMY     • ENDOMETRIAL ABLATION     • HYSTERECTOMY         family history includes No Known Problems in her brother, brother, brother, brother, daughter, daughter, and sister; Rheum arthritis in her mother.    Social History   Substance Use Topics   • Smoking status: Never Smoker   • Smokeless tobacco: Never Used   • Alcohol use No       Review of Systems   Constitutional: Negative.  Negative for fatigue and fever.   HENT: Negative for sinus pressure, sore throat and tinnitus.    Eyes: Positive for photophobia.   Respiratory: Negative.  Negative for cough, chest tightness and shortness of breath.    Cardiovascular: Negative.  Negative for chest pain.   Gastrointestinal: Positive for nausea and vomiting. Negative for bowel incontinence, constipation and diarrhea.   Endocrine: Negative.    Genitourinary: Negative.  Negative for dysuria and frequency.   Musculoskeletal: Positive for back pain. Negative for arthralgias and myalgias.   Skin: Negative.    Allergic/Immunologic: Negative.    Neurological: Positive for dizziness, tingling (left side of face), seizures, loss of consciousness and headaches. Negative for facial asymmetry, weakness and paresthesias.   Hematological: Negative.  Negative for adenopathy.   Psychiatric/Behavioral: Positive for confusion. Negative for agitation.   All other systems reviewed and are negative.      Objective     /82  Resp 18  Ht 63\" (160 cm)  Wt 180 lb (81.6 kg)  BMI 31.89 kg/m2, Body mass index is 31.89 kg/(m^2).    Physical Exam   Constitutional: She is oriented to person, place, " and time. Vital signs are normal. She appears well-developed and well-nourished.   HENT:   Head: Normocephalic and atraumatic.   Right Ear: Hearing and external ear normal.   Left Ear: Hearing and external ear normal.   Nose: Nose normal.   Mouth/Throat: Uvula is midline, oropharynx is clear and moist and mucous membranes are normal.   Eyes: Conjunctivae, EOM and lids are normal. Pupils are equal, round, and reactive to light.   Neck: Trachea normal and normal range of motion. Neck supple. Carotid bruit is not present.   Cardiovascular: Normal rate, regular rhythm, S2 normal and normal heart sounds.    No murmur heard.  Pulmonary/Chest: Effort normal and breath sounds normal.   Abdominal: Soft. Bowel sounds are normal.   Musculoskeletal: Normal range of motion.        Lumbar back: She exhibits no tenderness and no bony tenderness.   Neurological: She is alert and oriented to person, place, and time. She has normal strength and normal reflexes. She displays no tremor. No sensory deficit. She displays a negative Romberg sign. Gait normal. Abnormal coordination: past pointing bilateral finger to nose. no ataxia. GCS eye subscore is 4. GCS verbal subscore is 5.   Reflex Scores:       Tricep reflexes are 2+ on the right side and 2+ on the left side.       Bicep reflexes are 2+ on the right side and 2+ on the left side.       Brachioradialis reflexes are 2+ on the right side and 2+ on the left side.       Patellar reflexes are 2+ on the right side and 2+ on the left side.       Achilles reflexes are 2+ on the right side and 2+ on the left side.  CN II:  Visual fields intact in upper fields. Decrease in lower fields.  Pupils equally reactive to light  CN III, IV, VI:  Extraocular Muscles full with no signs of nystagmus  CN V:  Facial sensory is symmetric with no asymetries.  CN VII:  Facial motor symmetric  CN VIII:  Gross hearing intact bilaterally  CN IX:  Palate elevates symmetrically  CN X:  Palate elevates  symmetrically  CN XI:  Shoulder shrug symmetric  CN XII:  Tongue is midline on protrusion   Skin: Skin is warm and dry.   Psychiatric: She has a normal mood and affect. Her speech is normal and behavior is normal. Cognition and memory are normal.   Nursing note and vitals reviewed.           ASSESSMENT/PLAN    Diagnoses and all orders for this visit:    Cerebral AVM     simple partial seizures, not intractable, without status epilepticus    Generalized headaches    S/P craniotomy    Chronic midline low back pain with right-sided sciatica  -     Ambulatory Referral to Physical Therapy Evaluate and treat  -     XR spine lumbar complete w flex ext; Future    Other orders  -     Discontinue: lacosamide (VIMPAT) 100 MG tablet tablet; Take 100 mg by mouth.  -     verapamil PM (VERELAN PM) 120 MG 24 hr capsule; Take 120 mg by mouth.  -     Discontinue: Topiramate ER (TROKENDI XR) 200 MG capsule sustained-release 24 hr; Take  by mouth.  -     Lacosamide (VIMPAT) 150 MG tablet; Take 150 mg by mouth 2 (Two) Times a Day.  -     Topiramate ER (TROKENDI XR) 200 MG capsule sustained-release 24 hr; Take 200 mg by mouth Every Night.      MEDICAL DECISION MAKIN. Patient had stopped Aptiom 2017  2. Continue with Trokendi  mg nightly  3. Increase Vimpat 150 mg BID and counseled on side effects  4. Seizure precautions were discussed to include no tub baths, no swimming, avoiding lack of sleep, and avoiding known triggers. Education given of things that may contribute to a seizure to include, but not limited to: stressful situations, fever, fatigue, lack of sleep, low blood sugar, hyperventilation, flashing lights, and caffeine. Instructions given to take seizure medications as prescribed. Education given to family member on what to do during a seizure and care following the seizure. Education given to contact this office prior to stopping or changing any medications.   5. Obtain xray lumbar spine  6. Refer to PT  7. Does  not use tobacco.  8. Patient's BMI is above normal parameters.  Follow-up plan includes educational material.  9. [facility] reviewed and Vimpat has not been filled since March 2017.  10. Will check CBC,CMP, vimpat level, B12 folate   allergies and all known medications/prescriptions have been reviewed using resources available on this encounter.    Return in about 2 months (around 2/1/2018).        Keeley Bourne, APRN

## 2017-12-04 ENCOUNTER — HOSPITAL ENCOUNTER (OUTPATIENT)
Dept: PHYSICAL THERAPY | Facility: HOSPITAL | Age: 37
Setting detail: THERAPIES SERIES
Discharge: HOME | End: 2017-12-04

## 2017-12-04 DIAGNOSIS — G89.29 CHRONIC MIDLINE LOW BACK PAIN WITH RIGHT-SIDED SCIATICA: Primary | ICD-10-CM

## 2017-12-04 DIAGNOSIS — M54.41 CHRONIC MIDLINE LOW BACK PAIN WITH RIGHT-SIDED SCIATICA: Primary | ICD-10-CM

## 2017-12-04 PROCEDURE — 97110 THERAPEUTIC EXERCISES: CPT | Performed by: PHYSICAL THERAPIST

## 2017-12-04 PROCEDURE — 97161 PT EVAL LOW COMPLEX 20 MIN: CPT | Performed by: PHYSICAL THERAPIST

## 2017-12-04 PROCEDURE — G8978 MOBILITY CURRENT STATUS: HCPCS | Performed by: PHYSICAL THERAPIST

## 2017-12-04 PROCEDURE — G8979 MOBILITY GOAL STATUS: HCPCS | Performed by: PHYSICAL THERAPIST
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
## 2017-12-06 ENCOUNTER — HOSPITAL ENCOUNTER (OUTPATIENT)
Dept: PHYSICAL THERAPY | Facility: HOSPITAL | Age: 37
Setting detail: THERAPIES SERIES
Discharge: HOME | End: 2017-12-06

## 2017-12-06 DIAGNOSIS — M54.41 CHRONIC MIDLINE LOW BACK PAIN WITH RIGHT-SIDED SCIATICA: Primary | ICD-10-CM

## 2017-12-06 DIAGNOSIS — G89.29 CHRONIC MIDLINE LOW BACK PAIN WITH RIGHT-SIDED SCIATICA: Primary | ICD-10-CM

## 2017-12-06 PROCEDURE — 97140 MANUAL THERAPY 1/> REGIONS: CPT

## 2017-12-06 PROCEDURE — 97110 THERAPEUTIC EXERCISES: CPT

## 2017-12-06 NOTE — THERAPY TREATMENT NOTE
Outpatient Physical Therapy Ortho Treatment Note   [facility]     Patient Name: Madny Barrera  : 1980  MRN: [id number]  Today's Date: 2017      Visit Date: 2017    Visit Dx:    ICD-10-CM ICD-9-CM   1. Chronic midline low back pain with right-sided sciatica M54.41 724.2    G89.29 724.3     338.29       Patient Active Problem List   Diagnosis   • Localization-related symptomatic epilepsy and epileptic syndromes with simple partial seizures, not intractable, without status epilepticus   • Cerebral AVM   • S/P craniotomy   • Generalized headaches   • Malabsorption of iron   • Anemia        Past Medical History:   Diagnosis Date   • Cerebral aneurysm    • GERD (gastroesophageal reflux disease)    • H/O: hysterectomy    • Hypertension    • Iron deficiency anemia    • Seizure         Past Surgical History:   Procedure Laterality Date   • APPENDECTOMY     •  SECTION      x2   • CHOLECYSTECTOMY     • COLONOSCOPY     • CRANIOTOMY     • ENDOMETRIAL ABLATION     • HYSTERECTOMY               PT Ortho       17 0930    Precautions and Contraindications    Precautions/Limitations seizure precautions  -KR    Posture/Observations    Alignment Options Thoracic kyphosis;Rounded shoulders;Lumbar lordosis  -KR    Thoracic Kyphosis Mild;Decreased  -KR    Rounded Shoulders Mild;Increased  -KR    Lumbar lordosis Moderate;Increased  -KR    Posture/Observations Comments tends to hinge L2/3  -KR    Quarter Clearing    Quarter Clearing Lower Quarter Clearing  -KR    DTR- Lower Quarter Clearing    Patellar tendon (L2-4) Bilateral:;2- Normal response  -KR    Achilles tendon (S1-2) Bilateral:;2- Normal response  -KR    Neural Tension Signs- Lower Quarter Clearing    SLR Bilateral:;Negative  -KR    Pathological Reflexes- Lower Quarter Clearing    Clonus Bilateral:;Negative  -KR    Sensory Screen for Light Touch- Lower Quarter Clearing    L1 (inguinal area) Bilateral:;Intact  -KR    L2 (anterior mid thigh)  Bilateral:;Intact  -KR    L3 (distal anterior thigh) Bilateral:;Intact  -KR    L4 (medial lower leg/foot) Bilateral:;Intact  -KR    L5 (lateral lower leg/great toe) Bilateral:;Intact  -KR    S1 (bottom of foot) Bilateral:;Intact  -KR    Myotomal Screen- Lower Quarter Clearing    Hip flexion (L2) Bilateral:;WNL  -KR    Knee extension (L3) Bilateral:;WNL  -KR    Ankle DF (L4) Bilateral:;WNL  -KR    Great toe extension (L5) Bilateral:;WNL  -KR    Ankle PF (S1) Bilateral:;WNL  -KR    Knee flexion (S2) Bilateral:;WNL  -KR    Lumbar ROM Screen- Lower Quarter Clearing    Lumbar Flexion Impaired   75%  -KR    Lumbar Extension Impaired   25%  -KR    Lumbar Lateral Flexion Impaired  -KR    Lumbar Rotation Impaired  -KR    Lumbar Quadrant  Impaired  -KR    SI/Hip Screen- Lower Quarter Clearing    Candace's/Nash's test Right:;Positive;Left:;Negative  -KR    Posterior thigh sheer Right:;Positive  -KR    Special Tests/Palpation    Special Tests/Palpation Cervical/Thoracic;Lumbar/SI;Hip  -KR    Thoracic Accessory Motions    Thoracic Accessory Motions Tested? Yes  -KR    Pa glide- Middle thoracic Center:;Hypomobile  -KR    Pa glide- Lower thoracic Center:;Hypomobile;Right pain  -KR    Lumbosacral Palpation    SI Right:;Tender  -KR    Thoracolumbar Segment Right:;Tender;Guarded/taut   mainly at R TL junction  -KR    Quadratus Lumborum Right:;Tender;Guarded/taut  -KR    Erector Spinae (Paraspinals) Right:;Tender;Guarded/taut  -KR    Lumbosacral Palpation? Yes  -KR    Lumbosacral Accessory Motions    Lumbosacral Accessory Motions Tested? Yes  -KR    PA Glide- L1 Right:;Hypomobile  -KR    PA Glide- L2 Right:;Hypermobile  -KR    PA Glide- L3 Right:;Hypermobile  -KR    PA Glide- L4 Right:;Hypomobile  -KR    PA Glide- L5 Right:;Hypomobile  -KR    PA glide- Sacral base Right:;Right pain  -KR    Lumbar/SI Special Tests    Standing Flexion Test (SI Dysfunction) Right:;Positive  -KR    Trendelenburg Test (Gluteus Medius Weakness)  Right:;Positive  -KR    SLR (Neural Tension) Bilateral:;Negative  -KR    Thigh Thrust/Posterior Shear (SI Dysfunction) Right:;Positive  -KR    MILEY (hip vs. SI Dysfunction) Right:;Positive  -KR    Sacral Spring Test (SI Dysfunction) Right:;Positive  -KR    Special Lumbosacral Questions    Are you pregnant? No  -KR    Have you had any abdominal surgeries? Yes  -KR    Do you have pain when standing on one leg? Yes  -KR    Hip/Thigh Palpation    SI Right:;Tender  -KR    Hip/Thigh Palpation? Yes  -KR    Hip Special Tests    Trendelenberg sign (gluteus medius weakness) Right:;Positive;Left:;Negative  -KR    Hip scour test (labral vs hip pathology) Bilateral:;Negative  -KR    MMT (Manual Muscle Testing)    General MMT Assessment Detail hip abduction R 3-/5 L 3-/5  -KR    Pathomechanics    Spine Pathomechanics Hinges into extension at one segment in lumbar  -KR    Balance Skills Training    SLS R 5 seconds prior to pain; L 15 seconds  -KR      User Key  (r) = Recorded By, (t) = Taken By, (c) = Cosigned By    Initials Name Provider Type    KR Connie Sen, PT DPT Physical Therapist                            PT Assessment/Plan       12/06/17 1100       PT Assessment    Assessment Comments Patient is reporting improvement with the use of the SI belt.  The right hip is still quite guarded habitually.  She is sleeping with multiple pillows under the knees in supine which has greatly limited the anterior hip mobility/hip flexors, which is causing shearing on the lumbar spine and SIJ.  We will address this going forward.   -RS     PT Plan    PT Plan Comments Continue to address anterior hip mobility deficits, decrease guarding, improve gluteal/lower abdominal control  -RS       User Key  (r) = Recorded By, (t) = Taken By, (c) = Cosigned By    Initials Name Provider Type    RS Yury Davila, PT DPT Physical Therapist                    Exercises       12/06/17 0800          Subjective Pain    Able to rate  subjective pain? yes  -RS      Pre-Treatment Pain Level 3  -RS      Post-Treatment Pain Level 3  -RS      Exercise 1    Exercise Name 1 supine lower abdominal biased PPT/neutral spine  -RS      Cueing 1 Tactile;Verbal  -RS      Time (Minutes) 1 5  -RS      Exercise 2    Exercise Name 2 supine (R) heel slides maintaining neutral spine  -RS      Cueing 2 Tactile;Verbal  -RS      Sets 2 3  -RS      Reps 2 12  -RS      Exercise 3    Exercise Name 3 reviewed SI belt indication and importance of trusting weight bearing with gluteal contraction  -RS        User Key  (r) = Recorded By, (t) = Taken By, (c) = Cosigned By    Initials Name Provider Type    RS Yury Davila, PT DPT Physical Therapist                        Manual Rx (last 36 hours)      Manual Treatments       12/06/17 0800          Manual Rx 1    Manual Rx 1 Location prone pillow under abdomen right lower lumbar  -RS      Manual Rx 1 Type STM  -RS      Manual Rx 1 Grade min OP  -RS      Manual Rx 1 Duration 8  -RS      Manual Rx 2    Manual Rx 2 Location (R) deep lateral rotators/ITB  -RS      Manual Rx 2 Type STM in (L) sidelying pillow between knees  -RS      Manual Rx 2 Grade min OP  -RS      Manual Rx 2 Duration 12  -RS      Manual Rx 3    Manual Rx 3 Location (R) hip  -RS      Manual Rx 3 Type LAD open packed  -RS      Manual Rx 3 Grade 2 sustained  -RS      Manual Rx 3 Duration 3  -RS      Manual Rx 4    Manual Rx 4 Location (R) hip  -RS      Manual Rx 4 Type LAD moving into more extension (hooklying)  -RS      Manual Rx 4 Grade 2 sustained  -RS      Manual Rx 4 Duration 3  -RS        User Key  (r) = Recorded By, (t) = Taken By, (c) = Cosigned By    Initials Name Provider Type    RS Yury Davila, PT DPT Physical Therapist                PT OP Goals       12/06/17 0800       PT Short Term Goals    STG Date to Achieve 01/03/18  -RS     STG 1 Pt will report pain no greater than 3-4/10 with household and work  activities.   -RS     STG 1  Progress Ongoing  -RS     Long Term Goals    LTG Date to Achieve 02/02/18  -RS     LTG 1 Pt will perform single leg stance for 30 seconds or greater without pain.   -RS     LTG 1 Progress Ongoing  -RS     LTG 1 Progress Comments still not trusting the right leg SLS with the belt use  -RS     LTG 2 Pt will demonstate hip abduction strength 4/5 or greater bilatereally.   -RS     LTG 2 Progress Ongoing  -RS     LTG 3 Pt will report pain no greater than 1-2/10 with household and work activities.   -RS     LTG 3 Progress Ongoing  -RS     Time Calculation    PT Goal Re-Cert Due Date 01/03/17  -RS       User Key  (r) = Recorded By, (t) = Taken By, (c) = Cosigned By    Initials Name Provider Type    JUNE Davila, PT DPT Physical Therapist                Therapy Education       12/06/17 0800          Therapy Education    Education Details continue previous; don't force the PPT with the gluteals  -RS      Given HEP;Symptoms/condition management  -RS      Program Reinforced  -RS      How Provided Verbal;Demonstration  -RS      Provided to Patient  -RS      Level of Understanding Demonstrated;Verbalized  -RS        User Key  (r) = Recorded By, (t) = Taken By, (c) = Cosigned By    Initials Name Provider Type    JUNE Davila, PT DPT Physical Therapist                Outcome Measures       12/04/17 0930          Modified Oswestry    Modified Oswestry Score/Comments 15/50   30%  -KR      Functional Assessment    Outcome Measure Options Modifed Owestry  -KR        User Key  (r) = Recorded By, (t) = Taken By, (c) = Cosigned By    Initials Name Provider Type    CASSIUS Sen, PT DPT Physical Therapist            Time Calculation:   Start Time: 0820  Stop Time: 0900  Time Calculation (min): 40 min  Total Timed Code Minutes- PT: 40 minute(s)    Therapy Charges for Today     Code Description Service Date Service Provider Modifiers Qty    50335650065 HC PT MANUAL THERAPY EA 15 MIN 12/6/2017 Yury Guy  Giovanni, PT DPT GP 2    [id number]  PT THER PROC EA 15 MIN 12/6/2017 Yury Davila, PT DPT GP 1                    BRENT Davila, PT DPT  12/6/2017

## 2017-12-12 ENCOUNTER — APPOINTMENT (OUTPATIENT)
Dept: PHYSICAL THERAPY | Facility: HOSPITAL | Age: 37
End: 2017-12-12

## 2017-12-14 ENCOUNTER — HOSPITAL ENCOUNTER (OUTPATIENT)
Dept: PHYSICAL THERAPY | Facility: HOSPITAL | Age: 37
Setting detail: THERAPIES SERIES
Discharge: HOME | End: 2017-12-14

## 2017-12-14 DIAGNOSIS — M54.41 CHRONIC MIDLINE LOW BACK PAIN WITH RIGHT-SIDED SCIATICA: Primary | ICD-10-CM

## 2017-12-14 DIAGNOSIS — G89.29 CHRONIC MIDLINE LOW BACK PAIN WITH RIGHT-SIDED SCIATICA: Primary | ICD-10-CM

## 2017-12-14 PROCEDURE — 97110 THERAPEUTIC EXERCISES: CPT | Performed by: PHYSICAL THERAPIST

## 2017-12-14 PROCEDURE — 97140 MANUAL THERAPY 1/> REGIONS: CPT | Performed by: PHYSICAL THERAPIST

## 2017-12-14 NOTE — THERAPY TREATMENT NOTE
"    Outpatient Physical Therapy Ortho Treatment Note  [facility]     Patient Name: Mandy Barrera  : 1980  MRN: [id number]  Today's Date: 2017      Visit Date: 2017    Visit Dx:    ICD-10-CM ICD-9-CM   1. Chronic midline low back pain with right-sided sciatica M54.41 724.2    G89.29 724.3     338.29       Patient Active Problem List   Diagnosis   • Localization-related symptomatic epilepsy and epileptic syndromes with simple partial seizures, not intractable, without status epilepticus   • Cerebral AVM   • S/P craniotomy   • Generalized headaches   • Malabsorption of iron   • Anemia        Past Medical History:   Diagnosis Date   • Cerebral aneurysm    • GERD (gastroesophageal reflux disease)    • H/O: hysterectomy    • Hypertension    • Iron deficiency anemia    • Seizure         Past Surgical History:   Procedure Laterality Date   • APPENDECTOMY     •  SECTION      x2   • CHOLECYSTECTOMY     • COLONOSCOPY     • CRANIOTOMY     • ENDOMETRIAL ABLATION     • HYSTERECTOMY                               PT Assessment/Plan       17 08       PT Assessment    Assessment Comments She does report slight  increase pain after laying supine to complete core exercises. Overall she is reporting  less pain and feeling better. 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 SHe conitnue to have decreased hip/core control with and anterior pelvic tilt.   -KR     PT Plan    PT Plan Comments Assess hip mobility next visit, progress with hip/core and postural activities.   -CASSIUS       User Key  (r) = Recorded By, (t) = Taken By, (c) = Cosigned By    Initials Name Provider Type    CASSIUS Sen, PT DPT Physical Therapist                    Exercises       17 08          Subjective Comments    Subjective Comments She reports very little back pain, it is improving, reports \"I can tell a big difference\". Reports the belt is " really helping.   -KR      Subjective Pain    Able to rate subjective pain? yes  -KR      Pre-Treatment Pain Level --   2 or 3  -KR      Post-Treatment Pain Level --   4 or 5  -KR      Exercise 1    Exercise Name 1 LTR on SB   -KR      Cueing 1 Verbal  -KR      Sets 1 2  -KR      Reps 1 10  -KR      Exercise 2    Exercise Name 2 PPT  -KR      Reps 2 20  -KR      Exercise 3    Exercise Name 3 BkFO  -KR      Cueing 3 Verbal;Tactile  -KR      Sets 3 2  -KR      Reps 3 10  -KR      Exercise 4    Exercise Name 4 SKC   -KR      Cueing 4 Verbal  -KR      Sets 4 5  -KR      Time (Seconds) 4 10 sec  -KR      Exercise 5    Exercise Name 5 Hip abduction with green TB   -KR      Cueing 5 Verbal  -KR      Sets 5 2  -KR      Reps 5 10  -KR      Time (Seconds) 5 3 sec hold  -KR        User Key  (r) = Recorded By, (t) = Taken By, (c) = Cosigned By    Initials Name Provider Type    CASSIUS Sen, PT DPT Physical Therapist                        Manual Rx (last 36 hours)      Manual Treatments       12/14/17 0830          Manual Rx 1    Manual Rx 1 Location prone pillow under abdomen right lower lumbar paraspinals and gluts with blue rachet   -KR      Manual Rx 1 Type STM  -KR      Manual Rx 1 Grade min OP  -KR      Manual Rx 1 Duration 10  -KR      Manual Rx 2    Manual Rx 2 Location (R) deep lateral rotators/ITB  -KR      Manual Rx 2 Type STM in (L) sidelying pillow between knees  -KR      Manual Rx 2 Grade min OP  -KR      Manual Rx 2 Duration 5  -KR      Manual Rx 3    Manual Rx 3 Location (R) hip  -KR      Manual Rx 3 Type LAD open packed  -KR      Manual Rx 3 Grade 2 sustained  -KR      Manual Rx 3 Duration 3  -KR      Manual Rx 4    Manual Rx 4 Location --  -KR      Manual Rx 4 Type --  -KR      Manual Rx 4 Grade --  -KR      Manual Rx 4 Duration --  -KR        User Key  (r) = Recorded By, (t) = Taken By, (c) = Cosigned By    Initials Name Provider Type    CASSIUS Sen, PT DPT Physical Therapist                 PT OP Goals       12/14/17 0830       PT Short Term Goals    STG Date to Achieve 01/03/18  -KR     STG 1 Pt will report pain no greater than 3-4/10 with household and work  activities.   -KR     STG 1 Progress Ongoing  -KR     STG 1 Progress Comments 2 or 3/10 today  -KR     Long Term Goals    LTG Date to Achieve 02/02/18  -KR     LTG 1 Pt will perform single leg stance for 30 seconds or greater without pain.   -KR     LTG 1 Progress Ongoing  -KR     LTG 2 Pt will demonstate hip abduction strength 4/5 or greater bilatereally.   -KR     LTG 2 Progress Ongoing  -KR     LTG 3 Pt will report pain no greater than 1-2/10 with household and work activities.   -KR     LTG 3 Progress Ongoing  -KR     Time Calculation    PT Goal Re-Cert Due Date 01/03/18  -KR       User Key  (r) = Recorded By, (t) = Taken By, (c) = Cosigned By    Initials Name Provider Type    CASSIUS Sen, PT DPT Physical Therapist          Therapy Education  Given: HEP, Posture/body mechanics  Program: Reinforced  How Provided: Verbal  Provided to: Patient  Level of Understanding: Verbalized              Time Calculation:   Start Time: 0830  Stop Time: 0915  Time Calculation (min): 45 min  Total Timed Code Minutes- PT: 45 minute(s)    Therapy Charges for Today     Code Description Service Date Service Provider Modifiers Qty    23940136757 HC PT MANUAL THERAPY EA 15 MIN 12/14/2017 Connie Sen, PT DPT GP 1    94988141659 HC PT THER PROC EA 15 MIN 12/14/2017 Cnonie Sen, PT DPT GP 2                    Connie Sen, PT DPT  12/14/2017

## 2017-12-19 ENCOUNTER — APPOINTMENT (OUTPATIENT)
Dept: PHYSICAL THERAPY | Facility: HOSPITAL | Age: 37
End: 2017-12-19

## 2017-12-21 ENCOUNTER — HOSPITAL ENCOUNTER (OUTPATIENT)
Dept: PHYSICAL THERAPY | Facility: HOSPITAL | Age: 37
Setting detail: THERAPIES SERIES
Discharge: HOME | End: 2017-12-21

## 2017-12-21 DIAGNOSIS — M54.41 CHRONIC MIDLINE LOW BACK PAIN WITH RIGHT-SIDED SCIATICA: Primary | ICD-10-CM

## 2017-12-21 DIAGNOSIS — G89.29 CHRONIC MIDLINE LOW BACK PAIN WITH RIGHT-SIDED SCIATICA: Primary | ICD-10-CM

## 2017-12-21 PROCEDURE — 97110 THERAPEUTIC EXERCISES: CPT

## 2017-12-21 PROCEDURE — 97140 MANUAL THERAPY 1/> REGIONS: CPT

## 2017-12-21 NOTE — THERAPY TREATMENT NOTE
Outpatient Physical Therapy Ortho Treatment Note  [facility]     Patient Name: Mandy Barrera  : 1980  MRN: [id number]  Today's Date: 2017      Visit Date: 2017    Visit Dx:    ICD-10-CM ICD-9-CM   1. Chronic midline low back pain with right-sided sciatica M54.41 724.2    G89.29 724.3     338.29       Patient Active Problem List   Diagnosis   • Localization-related symptomatic epilepsy and epileptic syndromes with simple partial seizures, not intractable, without status epilepticus   • Cerebral AVM   • S/P craniotomy   • Generalized headaches   • Malabsorption of iron   • Anemia        Past Medical History:   Diagnosis Date   • Cerebral aneurysm    • GERD (gastroesophageal reflux disease)    • H/O: hysterectomy    • Hypertension    • Iron deficiency anemia    • Seizure         Past Surgical History:   Procedure Laterality Date   • APPENDECTOMY     •  SECTION      x2   • CHOLECYSTECTOMY     • COLONOSCOPY     • CRANIOTOMY     • ENDOMETRIAL ABLATION     • HYSTERECTOMY                               PT Assessment/Plan       17       PT Assessment    Assessment Comments Pt reports that she has started to feel increased pain superior to original location; however pain is mild. With palpation pt was more tender on lower thoracic than in lumbar today. Pt also continues to have limited hip mobility which will continue to be addresed in future sessions. Todays treatment focused on decreasing continued soft tissue restrictions as wellas continuing with gentle strengthening. Pt did well with all exercises with no complaints of increased pain or fatigue.   -TR     PT Plan    PT Plan Comments Assess hip mobility and continue with hip/core activities.   -TR       User Key  (r) = Recorded By, (t) = Taken By, (c) = Cosigned By    Initials Name Provider Type    DUSTIN Adams PTA Physical Therapy Assistant                    Exercises       17          Subjective  Comments    Subjective Comments Pt reports that she was caught up at work yesterday and was unable to call and let us knoe she would not be here yesterday. Pt reports that her pain has started to move up her spine some, but still mainly on the R side.   -TR      Subjective Pain    Able to rate subjective pain? yes  -TR      Pre-Treatment Pain Level 6  -TR      Post-Treatment Pain Level 5  -TR      Exercise 1    Exercise Name 1 BKFO B   -TR      Cueing 1 Verbal  -TR      Sets 1 1  -TR      Reps 1 20  -TR      Additional Comments Yellow T-band   -TR      Exercise 2    Exercise Name 2 R SKC stretch  -TR      Cueing 2 Tactile;Verbal  -TR      Sets 2 3  -TR      Time (Seconds) 2 30  -TR      Exercise 3    Exercise Name 3 Mini bridges   -TR      Cueing 3 Verbal;Tactile  -TR      Sets 3 2  -TR      Reps 3 10  -TR      Exercise 4    Exercise Name 4 L sidelying R clamshells   -TR      Cueing 4 Verbal  -TR      Sets 4 3  -TR      Reps 4 10  -TR      Exercise 5    Exercise Name 5 LTR on SB   -TR      Cueing 5 Verbal  -TR      Sets 5 2  -TR      Reps 5 10  -TR      Exercise 6    Exercise Name 6 Reviewed PPT for HEP   -TR        User Key  (r) = Recorded By, (t) = Taken By, (c) = Cosigned By    Initials Name Provider Type    TR Janie Adams, PTA Physical Therapy Assistant                        Manual Rx (last 36 hours)      Manual Treatments       12/21/17 0814          Manual Rx 1    Manual Rx 1 Location prone pillow under abdomen right lower lumbar paraspinals and gluts with blue foam roller   -TR      Manual Rx 1 Type STM  -TR      Manual Rx 1 Grade min OP  -TR      Manual Rx 1 Duration 10  -TR      Manual Rx 2    Manual Rx 2 Location ITB   -TR      Manual Rx 2 Type STM in (L) sidelying pillow between knees with foam roller   -TR      Manual Rx 2 Grade min OP  -TR      Manual Rx 2 Duration 5  -TR      Manual Rx 3    Manual Rx 3 Location (R) hip  -TR      Manual Rx 3 Type LAD open packed  -TR      Manual Rx 3 Grade 2  sustained  -TR      Manual Rx 3 Duration 3  -TR        User Key  (r) = Recorded By, (t) = Taken By, (c) = Cosigned By    Initials Name Provider Type    DUSTIN Adams PTA Physical Therapy Assistant                PT OP Goals       12/21/17 0814       PT Short Term Goals    STG Date to Achieve 01/03/18  -TR     STG 1 Pt will report pain no greater than 3-4/10 with household and work  activities.   -TR     STG 1 Progress Ongoing  -TR     STG 1 Progress Comments Pt reports that her pain will still increase to a 10 with prolonged standing or sitting.   -TR     Long Term Goals    LTG Date to Achieve 02/02/18  -TR     LTG 1 Pt will perform single leg stance for 30 seconds or greater without pain.   -TR     LTG 1 Progress Ongoing  -TR     LTG 2 Pt will demonstate hip abduction strength 4/5 or greater bilatereally.   -TR     LTG 2 Progress Ongoing  -TR     LTG 3 Pt will report pain no greater than 1-2/10 with household and work activities.   -TR     LTG 3 Progress Ongoing  -TR     Time Calculation    PT Goal Re-Cert Due Date 01/03/18  -TR       User Key  (r) = Recorded By, (t) = Taken By, (c) = Cosigned By    Initials Name Provider Type    DUSTIN Adams PTA Physical Therapy Assistant          Therapy Education  Given: HEP, Posture/body mechanics  Program: Reinforced  How Provided: Verbal  Provided to: Patient  Level of Understanding: Verbalized              Time Calculation:   Start Time: 0814  Stop Time: 0859  Time Calculation (min): 45 min  Total Timed Code Minutes- PT: 45 minute(s)    Therapy Charges for Today     Code Description Service Date Service Provider Modifiers Qty    37908224643 HC PT THER PROC EA 15 MIN 12/21/2017 Janie Adams PTA GP 2    78469117359 HC PT MANUAL THERAPY EA 15 MIN 12/21/2017 Janie Adams PTA GP 1                    Janie Adams PTA  12/21/2017

## 2017-12-27 ENCOUNTER — HOSPITAL ENCOUNTER (OUTPATIENT)
Dept: PHYSICAL THERAPY | Facility: HOSPITAL | Age: 37
Setting detail: THERAPIES SERIES
Discharge: HOME | End: 2017-12-27

## 2017-12-27 DIAGNOSIS — G89.29 CHRONIC MIDLINE LOW BACK PAIN WITH RIGHT-SIDED SCIATICA: Primary | ICD-10-CM

## 2017-12-27 DIAGNOSIS — M54.41 CHRONIC MIDLINE LOW BACK PAIN WITH RIGHT-SIDED SCIATICA: Primary | ICD-10-CM

## 2017-12-27 PROCEDURE — 97110 THERAPEUTIC EXERCISES: CPT

## 2017-12-27 PROCEDURE — 97140 MANUAL THERAPY 1/> REGIONS: CPT

## 2017-12-27 NOTE — THERAPY TREATMENT NOTE
Outpatient Physical Therapy Ortho Treatment Note  [facility]     Patient Name: Mandy Barrera  : 1980  MRN: [id number]  Today's Date: 2017      Visit Date: 2017    Visit Dx:    ICD-10-CM ICD-9-CM   1. Chronic midline low back pain with right-sided sciatica M54.41 724.2    G89.29 724.3     338.29       Patient Active Problem List   Diagnosis   • Localization-related symptomatic epilepsy and epileptic syndromes with simple partial seizures, not intractable, without status epilepticus   • Cerebral AVM   • S/P craniotomy   • Generalized headaches   • Malabsorption of iron   • Anemia        Past Medical History:   Diagnosis Date   • Cerebral aneurysm    • GERD (gastroesophageal reflux disease)    • H/O: hysterectomy    • Hypertension    • Iron deficiency anemia    • Seizure         Past Surgical History:   Procedure Laterality Date   • APPENDECTOMY     •  SECTION      x2   • CHOLECYSTECTOMY     • COLONOSCOPY     • CRANIOTOMY     • ENDOMETRIAL ABLATION     • HYSTERECTOMY                               PT Assessment/Plan       17 0850       PT Assessment    Assessment Comments Patient presented with increased pain today due to activity over the weekend. She does continue to have guarding along her lumbar paraspinals bilaterally today. She also continues to have decreased hip control during active motions, with the right having less stability than left.  She is able to achieve neutral spine, however can maintain it only 50% of time.  -MANUEL     PT Plan    PT Plan Comments We will assess the added HEP components.  Continue to work on hip and core recruitment in order to improve neutral  spine and lumbar stability.  -MANUEL       User Key  (r) = Recorded By, (t) = Taken By, (c) = Cosigned By    Initials Name Provider Type    MANUEL Donnelly PTA Physical Therapy Assistant                    Exercises       17 0850          Subjective Comments    Subjective Comments Patient reports  her back is doing pretty bad today due to do a lot over the weekend.  She reports her pain is going all the way across the back radiating up her back and down the right thigh.  At first she reported pain was 10/10 and then said 7/10 once she was eduacated on the pain scale.  -MANUEL      Subjective Pain    Able to rate subjective pain? yes  -MANUEL      Pre-Treatment Pain Level 7  -MANUEL      Post-Treatment Pain Level 0  -MANUEL      Exercise 1    Exercise Name 1 BKFO B   -MANUEL      Cueing 1 Verbal;Tactile  -MANUEL      Sets 1 2  -MANUEL      Reps 1 10  -MANUEL      Additional Comments Yellow T-band. Added to HEP  -MANUEL      Exercise 2    Exercise Name 2 supine (R) heel slides maintaining neutral spine  -MANUEL      Cueing 2 Verbal;Tactile  -MANUEL      Sets 2 2  -MANUEL      Reps 2 10  -MANUEL      Additional Comments cues for breathing and slow motion  -MANUEL        User Key  (r) = Recorded By, (t) = Taken By, (c) = Cosigned By    Initials Name Provider Type    MANUEL Donnelly PTA Physical Therapy Assistant                        Manual Rx (last 36 hours)      Manual Treatments       12/27/17 0852          Manual Rx 1    Manual Rx 1 Location prone pillow under abdomen lower lumbar paraspinals  -MANUEL      Manual Rx 1 Type STM with free up  -MANUEL      Manual Rx 1 Grade min-mod  -MANUEL      Manual Rx 1 Duration 10  -MANUEL      Manual Rx 2    Manual Rx 2 Location prone pillow under abdomen gluts with blue foam roller   -MANUEL      Manual Rx 2 Type STM  -MANUEL      Manual Rx 2 Grade min  -MANUEL      Manual Rx 2 Duration 4  -MANUEL      Manual Rx 3    Manual Rx 3 Location manual LS distraction  -MANUEL      Manual Rx 3 Grade 1-2 repetitive  -MANUEL      Manual Rx 3 Duration 3   she reported slight pain relief  -MANUEL        User Key  (r) = Recorded By, (t) = Taken By, (c) = Cosigned By    Initials Name Provider Type    MANUEL Donnelly PTA Physical Therapy Assistant                PT OP Goals       12/27/17 0850       PT Short Term Goals    STG Date to Achieve 01/03/18  -MANUEL     STG 1 Pt will  report pain no greater than 3-4/10 with household and work  activities.   -MANUEL     STG 1 Progress Ongoing  -MANUEL     STG 1 Progress Comments She reports her pain increases to 8 or 9/10 when washing dishes  -MANUEL     Long Term Goals    LTG Date to Achieve 02/02/18  -MANUEL     LTG 1 Pt will perform single leg stance for 30 seconds or greater without pain.   -MANUEL     LTG 1 Progress Ongoing  -MANUEL     LTG 2 Pt will demonstate hip abduction strength 4/5 or greater bilatereally.   -MANUEL     LTG 2 Progress Ongoing  -MANUEL     LTG 2 Progress Comments She continues to have decreased control with active motions  -MANUEL     LTG 3 Pt will report pain no greater than 1-2/10 with household and work activities.   -MANUEL     LTG 3 Progress Ongoing  -MANUEL     Time Calculation    PT Goal Re-Cert Due Date 01/02/18  -MANUEL       User Key  (r) = Recorded By, (t) = Taken By, (c) = Cosigned By    Initials Name Provider Type    MANUEL Donnelly PTA Physical Therapy Assistant          Therapy Education  Education Details: heel slides and BKFO with yellow band  Given: HEP  Program: New  How Provided: Verbal, Written  Provided to: Patient  Level of Understanding: Verbalized, Demonstrated              Time Calculation:   Start Time: 0850  Stop Time: 0934  Time Calculation (min): 44 min  Total Timed Code Minutes- PT: 44 minute(s)    Therapy Charges for Today     Code Description Service Date Service Provider Modifiers Qty    88262127864 HC PT MANUAL THERAPY EA 15 MIN 12/27/2017 Ramon Donnelly PTA GP 1    63326887125 HC PT THER PROC EA 15 MIN 12/27/2017 Ramon Donnelly PTA GP 2                    Ramon Donnelly PTA  12/27/2017

## 2018-02-12 ENCOUNTER — DOCUMENTATION (OUTPATIENT)
Dept: PHYSICAL THERAPY | Facility: HOSPITAL | Age: 38
End: 2018-02-12

## 2018-02-12 NOTE — THERAPY DISCHARGE NOTE
Outpatient Physical Therapy Ortho Treatment Note/Discharge Summary       Patient Name: Mandy Barrera  : 1980  MRN: [id number]  Today's Date: 2018      Visit Date: 2018    Visit Dx:  No diagnosis found.    Patient Active Problem List   Diagnosis   • Localization-related symptomatic epilepsy and epileptic syndromes with simple partial seizures, not intractable, without status epilepticus   • Cerebral AVM   • S/P craniotomy   • Generalized headaches   • Malabsorption of iron   • Anemia        Past Medical History:   Diagnosis Date   • Cerebral aneurysm    • GERD (gastroesophageal reflux disease)    • H/O: hysterectomy    • Hypertension    • Iron deficiency anemia    • Seizure         Past Surgical History:   Procedure Laterality Date   • APPENDECTOMY     •  SECTION      x2   • CHOLECYSTECTOMY     • COLONOSCOPY     • CRANIOTOMY     • ENDOMETRIAL ABLATION     • HYSTERECTOMY                                                          PT OP Goals       18 1400       PT Short Term Goals    STG Date to Achieve 18  -TR     STG 1 Pt will report pain no greater than 3-4/10 with household and work  activities.   -TR     STG 1 Progress Not Met  -TR     Long Term Goals    LTG Date to Achieve 18  -TR     LTG 1 Pt will perform single leg stance for 30 seconds or greater without pain.   -TR     LTG 1 Progress Not Met  -TR     LTG 2 Pt will demonstate hip abduction strength 4/5 or greater bilatereally.   -TR     LTG 2 Progress Not Met  -TR     LTG 3 Pt will report pain no greater than 1-2/10 with household and work activities.   -TR     LTG 3 Progress Not Met  -TR       User Key  (r) = Recorded By, (t) = Taken By, (c) = Cosigned By    Initials Name Provider Type    DUSTIN Adams, PTA Physical Therapy Assistant                         Time Calculation:                  OP PT Discharge Summary  Date of Discharge: 18  Reason for Discharge: Non-compliant  Outcomes  Achieved: Refer to plan of care for updates on goals achieved  Discharge Destination: Home without follow-up  Discharge Instructions: Pt had 2 no shows and did not return phone calls, therefore discharged. Pt informed via voicemail.       Janie Adams, PTA  2/12/2018

## 2018-02-28 ENCOUNTER — HOSPITAL ENCOUNTER (OUTPATIENT)
Dept: PHYSICAL THERAPY | Facility: HOSPITAL | Age: 38
Discharge: HOME | End: 2018-02-28

## 2018-02-28 ENCOUNTER — TELEPHONE (OUTPATIENT)
Dept: NEUROLOGY | Facility: CLINIC | Age: 38
End: 2018-02-28

## 2018-02-28 PROCEDURE — 97799 UNLISTED PHYSCL MED/REHAB PX: CPT

## 2018-02-28 NOTE — TELEPHONE ENCOUNTER
----- Message from Roselia Chowdhury sent at 2/27/2018  4:27 PM CST -----  Regarding: Gold Card  Please call her. I tried to call the [employer] 800 number but it was a long hold. Pharmacy still wanting to charge 700 dollars

## 2018-02-28 NOTE — TELEPHONE ENCOUNTER
I did offer [place] samples of [employer]. I also made sure she has activated her card. I explained that she will need to have the pharmacy back out her insurance like she is a cash pay patient. She did voice understanding. I did request she call me if she has any further issues and she did agree to do that.

## 2018-03-20 ENCOUNTER — TRANSCRIBE ORDERS (OUTPATIENT)
Dept: ADMINISTRATIVE | Facility: HOSPITAL | Age: 38
End: 2018-03-20

## 2018-03-20 DIAGNOSIS — D50.9 IRON DEFICIENCY ANEMIA, UNSPECIFIED IRON DEFICIENCY ANEMIA TYPE: Primary | ICD-10-CM

## 2018-03-20 DIAGNOSIS — G40.909 NONINTRACTABLE EPILEPSY WITHOUT STATUS EPILEPTICUS, UNSPECIFIED EPILEPSY TYPE (HCC): ICD-10-CM

## 2018-03-20 DIAGNOSIS — E55.9 VITAMIN D DEFICIENCY: ICD-10-CM

## 2018-05-05 ENCOUNTER — OFFICE VISIT (OUTPATIENT)
Dept: RETAIL CLINIC | Facility: CLINIC | Age: 38
End: 2018-05-05

## 2018-05-05 VITALS — RESPIRATION RATE: 18 BRPM | TEMPERATURE: 97.8 F | HEART RATE: 90 BPM | OXYGEN SATURATION: 98 %

## 2018-05-05 DIAGNOSIS — T78.40XA ALLERGIC REACTION, INITIAL ENCOUNTER: ICD-10-CM

## 2018-05-05 DIAGNOSIS — R21 RASH WITHOUT HIVES: Primary | ICD-10-CM

## 2018-05-05 PROCEDURE — 99213 OFFICE O/P EST LOW 20 MIN: CPT | Performed by: NURSE PRACTITIONER

## 2018-05-05 PROCEDURE — 96372 THER/PROPH/DIAG INJ SC/IM: CPT | Performed by: NURSE PRACTITIONER

## 2018-05-05 RX ORDER — DEXAMETHASONE SODIUM PHOSPHATE 4 MG/ML
4 INJECTION, SOLUTION INTRA-ARTICULAR; INTRALESIONAL; INTRAMUSCULAR; INTRAVENOUS; SOFT TISSUE ONCE
Status: DISCONTINUED | OUTPATIENT
Start: 2018-05-05 | End: 2018-05-05

## 2018-05-05 RX ORDER — METHYLPREDNISOLONE ACETATE 80 MG/ML
40 INJECTION, SUSPENSION INTRA-ARTICULAR; INTRALESIONAL; INTRAMUSCULAR; SOFT TISSUE ONCE
Status: COMPLETED | OUTPATIENT
Start: 2018-05-05 | End: 2018-05-05

## 2018-05-05 RX ORDER — DEXAMETHASONE SODIUM PHOSPHATE 4 MG/ML
4 INJECTION, SOLUTION INTRA-ARTICULAR; INTRALESIONAL; INTRAMUSCULAR; INTRAVENOUS; SOFT TISSUE ONCE
Status: COMPLETED | OUTPATIENT
Start: 2018-05-05 | End: 2018-05-05

## 2018-05-05 RX ADMIN — DEXAMETHASONE SODIUM PHOSPHATE 4 MG: 4 INJECTION, SOLUTION INTRA-ARTICULAR; INTRALESIONAL; INTRAMUSCULAR; INTRAVENOUS; SOFT TISSUE at 14:53

## 2018-05-05 RX ADMIN — METHYLPREDNISOLONE ACETATE 40 MG: 80 INJECTION, SUSPENSION INTRA-ARTICULAR; INTRALESIONAL; INTRAMUSCULAR; SOFT TISSUE at 14:56

## 2018-05-05 NOTE — PROGRESS NOTES
Chief Complaint   Patient presents with   • Allergic Reaction     Subjective   Mandy Barrera is a 37 y.o. female who presents to the clinic today with complaints allergic reaction to mangos. She has tried to take the benadryl and this has helped the edema in her face, but he still have facial allergy and itching.   Allergic Reaction   This is a new problem. The current episode started 2 days ago. The problem occurs constantly. The problem is unchanged. The problem is moderate. The patient was exposed to food. The time of exposure was just prior to onset. The exposure occurred at home. Associated symptoms include itching and a rash. Pertinent negatives include no abdominal pain, chest pain, chest pressure, coughing, diarrhea, drooling, eye itching, eye redness, eye watering, globus sensation, hyperventilation, stridor, trouble swallowing or vomiting. Swelling is present on the face (legs). Past treatments include diphenhydramine. The treatment provided mild relief. There is no history of asthma, atopic dermatitis, food allergies, medication allergies or seasonal allergies.         Current Outpatient Prescriptions:   •  Lacosamide (VIMPAT) 150 MG tablet, Take 150 mg by mouth 2 (Two) Times a Day., Disp: 60 tablet, Rfl: 2  •  naproxen sodium (ALEVE) 220 MG tablet, Take 440 mg by mouth 2 (Two) Times a Day As Needed for Mild Pain ., Disp: , Rfl:   •  Topiramate ER (TROKENDI XR) 200 MG capsule sustained-release 24 hr, Take 200 mg by mouth Every Night., Disp: 30 capsule, Rfl: 6  •  verapamil PM (VERELAN PM) 120 MG 24 hr capsule, Take 120 mg by mouth., Disp: , Rfl:   •  albuterol (PROVENTIL HFA;VENTOLIN HFA) 108 (90 BASE) MCG/ACT inhaler, Inhale into the lungs., Disp: , Rfl:   No current facility-administered medications for this visit.     Allergies:  Infed [iron dextran]; Ampicillin; Latex; Lortab [hydrocodone-acetaminophen]; Morphine; Nitrofurantoin; and Poison ivy extract    Past Medical History:   Diagnosis Date    • Cerebral aneurysm    • GERD (gastroesophageal reflux disease)    • H/O: hysterectomy    • Hypertension    • Iron deficiency anemia    • Seizure      Past Surgical History:   Procedure Laterality Date   • APPENDECTOMY     •  SECTION      x2   • CHOLECYSTECTOMY     • COLONOSCOPY     • CRANIOTOMY     • ENDOMETRIAL ABLATION     • HYSTERECTOMY       Family History   Problem Relation Age of Onset   • Rheum arthritis Mother    • No Known Problems Brother    • No Known Problems Sister    • No Known Problems Daughter    • No Known Problems Brother    • No Known Problems Brother    • No Known Problems Brother    • No Known Problems Daughter      Social History   Substance Use Topics   • Smoking status: Never Smoker   • Smokeless tobacco: Never Used   • Alcohol use No       Review of Systems  Review of Systems   HENT: Negative for drooling and trouble swallowing.    Eyes: Negative for redness and itching.   Respiratory: Negative for cough and stridor.    Cardiovascular: Negative for chest pain.   Gastrointestinal: Negative for abdominal pain, diarrhea and vomiting.   Skin: Positive for itching and rash.   Allergic/Immunologic: Negative for food allergies.       Objective   Pulse 90   Temp 97.8 °F (36.6 °C) (Tympanic)   Resp 18   SpO2 98%       Physical Exam   Constitutional: She is oriented to person, place, and time. She appears well-developed and well-nourished.   HENT:   Head: Normocephalic and atraumatic.   Eyes: EOM are normal. Pupils are equal, round, and reactive to light.   Neck: Normal range of motion. Neck supple.   Cardiovascular: Normal rate, regular rhythm and normal heart sounds.    Pulmonary/Chest: Effort normal and breath sounds normal.   Neurological: She is alert and oriented to person, place, and time.   Skin: Skin is warm and dry. Rash: facial erythema with fading wheels along cheeks and lips, legs.     Psychiatric: She has a normal mood and affect. Her behavior is normal.   Vitals  reviewed.      Assessment/Plan     Mandy was seen today for allergic reaction.    Diagnoses and all orders for this visit:    Rash without hives  -     methylPREDNISolone acetate (DEPO-medrol) injection 40 mg; Inject 0.5 mL into the shoulder, thigh, or buttocks 1 (One) Time.  -     Discontinue: dexamethasone (DECADRON) injection 4 mg; Infuse 1 mL into a venous catheter 1 (One) Time.  -     Discontinue: dexamethasone (DECADRON) injection 4 mg; Infuse 1 mL into a venous catheter 1 (One) Time.  -     dexamethasone sodium phosphate injection 4 mg; Inject 1 mL into the shoulder, thigh, or buttocks 1 (One) Time.    Allergic reaction, initial encounter  -     Discontinue: dexamethasone (DECADRON) injection 4 mg; Infuse 1 mL into a venous catheter 1 (One) Time.  -     dexamethasone sodium phosphate injection 4 mg; Inject 1 mL into the shoulder, thigh, or buttocks 1 (One) Time.      Emergency room for any shortness of breath or difficulty breathing although her rash is improving with diphenhydramine alone.

## 2018-07-02 ENCOUNTER — HOSPITAL ENCOUNTER (EMERGENCY)
Facility: HOSPITAL | Age: 38
Discharge: HOME | End: 2018-07-03
Attending: EMERGENCY MEDICINE | Admitting: EMERGENCY MEDICINE

## 2018-07-02 DIAGNOSIS — R51.9 ACUTE NONINTRACTABLE HEADACHE, UNSPECIFIED HEADACHE TYPE: ICD-10-CM

## 2018-07-02 DIAGNOSIS — A08.4 VIRAL GASTROENTERITIS: Primary | ICD-10-CM

## 2018-07-02 PROCEDURE — 85025 COMPLETE CBC W/AUTO DIFF WBC: CPT | Performed by: EMERGENCY MEDICINE

## 2018-07-02 PROCEDURE — 93010 ELECTROCARDIOGRAM REPORT: CPT | Performed by: INTERNAL MEDICINE

## 2018-07-02 PROCEDURE — 96374 THER/PROPH/DIAG INJ IV PUSH: CPT

## 2018-07-02 PROCEDURE — 93005 ELECTROCARDIOGRAM TRACING: CPT | Performed by: EMERGENCY MEDICINE

## 2018-07-02 PROCEDURE — 96376 TX/PRO/DX INJ SAME DRUG ADON: CPT

## 2018-07-02 PROCEDURE — 96375 TX/PRO/DX INJ NEW DRUG ADDON: CPT

## 2018-07-02 PROCEDURE — 83690 ASSAY OF LIPASE: CPT | Performed by: EMERGENCY MEDICINE

## 2018-07-02 PROCEDURE — 99283 EMERGENCY DEPT VISIT LOW MDM: CPT

## 2018-07-02 RX ORDER — ONDANSETRON 2 MG/ML
4 INJECTION INTRAMUSCULAR; INTRAVENOUS ONCE
Status: COMPLETED | OUTPATIENT
Start: 2018-07-02 | End: 2018-07-03

## 2018-07-02 RX ORDER — METOCLOPRAMIDE HYDROCHLORIDE 5 MG/ML
10 INJECTION INTRAMUSCULAR; INTRAVENOUS ONCE
Status: COMPLETED | OUTPATIENT
Start: 2018-07-02 | End: 2018-07-03

## 2018-07-02 RX ORDER — DIPHENHYDRAMINE HYDROCHLORIDE 50 MG/ML
25 INJECTION INTRAMUSCULAR; INTRAVENOUS ONCE
Status: COMPLETED | OUTPATIENT
Start: 2018-07-02 | End: 2018-07-03

## 2018-07-02 RX ORDER — FENTANYL CITRATE 50 UG/ML
50 INJECTION, SOLUTION INTRAMUSCULAR; INTRAVENOUS ONCE
Status: COMPLETED | OUTPATIENT
Start: 2018-07-02 | End: 2018-07-03

## 2018-07-02 RX ORDER — SODIUM CHLORIDE 0.9 % (FLUSH) 0.9 %
10 SYRINGE (ML) INJECTION AS NEEDED
Status: DISCONTINUED | OUTPATIENT
Start: 2018-07-02 | End: 2018-07-03 | Stop reason: HOSPADM

## 2018-07-03 ENCOUNTER — APPOINTMENT (OUTPATIENT)
Dept: CT IMAGING | Facility: HOSPITAL | Age: 38
End: 2018-07-03

## 2018-07-03 VITALS
DIASTOLIC BLOOD PRESSURE: 70 MMHG | HEIGHT: 63 IN | OXYGEN SATURATION: 100 % | HEART RATE: 72 BPM | SYSTOLIC BLOOD PRESSURE: 112 MMHG | WEIGHT: 166 LBS | BODY MASS INDEX: 29.41 KG/M2 | TEMPERATURE: 97.5 F | RESPIRATION RATE: 13 BRPM

## 2018-07-03 LAB
ALBUMIN SERPL-MCNC: 3.8 G/DL (ref 3.5–5)
ALBUMIN/GLOB SERPL: 1.2 G/DL (ref 1.1–2.5)
ALP SERPL-CCNC: 65 U/L (ref 24–120)
ALT SERPL W P-5'-P-CCNC: 25 U/L (ref 0–54)
ANION GAP SERPL CALCULATED.3IONS-SCNC: 11 MMOL/L (ref 4–13)
AST SERPL-CCNC: 24 U/L (ref 7–45)
B-HCG UR QL: NEGATIVE
BASOPHILS # BLD AUTO: 0.03 10*3/MM3 (ref 0–0.2)
BASOPHILS NFR BLD AUTO: 0.3 % (ref 0–2)
BILIRUB SERPL-MCNC: 0.3 MG/DL (ref 0.1–1)
BILIRUB UR QL STRIP: NEGATIVE
BUN BLD-MCNC: 15 MG/DL (ref 5–21)
BUN/CREAT SERPL: 21.7 (ref 7–25)
CALCIUM SPEC-SCNC: 9.1 MG/DL (ref 8.4–10.4)
CHLORIDE SERPL-SCNC: 105 MMOL/L (ref 98–110)
CLARITY UR: CLEAR
CO2 SERPL-SCNC: 24 MMOL/L (ref 24–31)
COLOR UR: YELLOW
CREAT BLD-MCNC: 0.69 MG/DL (ref 0.5–1.4)
DEPRECATED RDW RBC AUTO: 43.7 FL (ref 40–54)
EOSINOPHIL # BLD AUTO: 0.13 10*3/MM3 (ref 0–0.7)
EOSINOPHIL NFR BLD AUTO: 1.3 % (ref 0–4)
ERYTHROCYTE [DISTWIDTH] IN BLOOD BY AUTOMATED COUNT: 14.1 % (ref 12–15)
GFR SERPL CREATININE-BSD FRML MDRD: 116 ML/MIN/1.73
GLOBULIN UR ELPH-MCNC: 3.2 GM/DL
GLUCOSE BLD-MCNC: 106 MG/DL (ref 70–100)
GLUCOSE UR STRIP-MCNC: NEGATIVE MG/DL
HCT VFR BLD AUTO: 38.5 % (ref 37–47)
HGB BLD-MCNC: 12.5 G/DL (ref 12–16)
HGB UR QL STRIP.AUTO: NEGATIVE
IMM GRANULOCYTES # BLD: 0.05 10*3/MM3 (ref 0–0.03)
IMM GRANULOCYTES NFR BLD: 0.5 % (ref 0–5)
INTERNAL NEGATIVE CONTROL: NEGATIVE
INTERNAL POSITIVE CONTROL: POSITIVE
KETONES UR QL STRIP: NEGATIVE
LEUKOCYTE ESTERASE UR QL STRIP.AUTO: NEGATIVE
LIPASE SERPL-CCNC: 125 U/L (ref 23–203)
LYMPHOCYTES # BLD AUTO: 2.25 10*3/MM3 (ref 0.72–4.86)
LYMPHOCYTES NFR BLD AUTO: 21.9 % (ref 15–45)
Lab: NORMAL
MCH RBC QN AUTO: 27.3 PG (ref 28–32)
MCHC RBC AUTO-ENTMCNC: 32.5 G/DL (ref 33–36)
MCV RBC AUTO: 84.1 FL (ref 82–98)
MONOCYTES # BLD AUTO: 0.6 10*3/MM3 (ref 0.19–1.3)
MONOCYTES NFR BLD AUTO: 5.8 % (ref 4–12)
NEUTROPHILS # BLD AUTO: 7.22 10*3/MM3 (ref 1.87–8.4)
NEUTROPHILS NFR BLD AUTO: 70.2 % (ref 39–78)
NITRITE UR QL STRIP: NEGATIVE
NRBC BLD MANUAL-RTO: 0 /100 WBC (ref 0–0)
PH UR STRIP.AUTO: 6 [PH] (ref 5–8)
PLATELET # BLD AUTO: 265 10*3/MM3 (ref 130–400)
PMV BLD AUTO: 11 FL (ref 6–12)
POTASSIUM BLD-SCNC: 3.4 MMOL/L (ref 3.5–5.3)
PROT SERPL-MCNC: 7 G/DL (ref 6.3–8.7)
PROT UR QL STRIP: NEGATIVE
RBC # BLD AUTO: 4.58 10*6/MM3 (ref 4.2–5.4)
SODIUM BLD-SCNC: 140 MMOL/L (ref 135–145)
SP GR UR STRIP: 1.02 (ref 1–1.03)
UROBILINOGEN UR QL STRIP: NORMAL
WBC NRBC COR # BLD: 10.28 10*3/MM3 (ref 4.8–10.8)

## 2018-07-03 PROCEDURE — 25010000002 METOCLOPRAMIDE PER 10 MG: Performed by: EMERGENCY MEDICINE

## 2018-07-03 PROCEDURE — 0 IOPAMIDOL PER 1 ML: Performed by: EMERGENCY MEDICINE

## 2018-07-03 PROCEDURE — 80053 COMPREHEN METABOLIC PANEL: CPT | Performed by: EMERGENCY MEDICINE

## 2018-07-03 PROCEDURE — 25010000002 DIPHENHYDRAMINE PER 50 MG: Performed by: EMERGENCY MEDICINE

## 2018-07-03 PROCEDURE — 70496 CT ANGIOGRAPHY HEAD: CPT

## 2018-07-03 PROCEDURE — 25010000002 FENTANYL CITRATE (PF) 100 MCG/2ML SOLUTION: Performed by: EMERGENCY MEDICINE

## 2018-07-03 PROCEDURE — 96374 THER/PROPH/DIAG INJ IV PUSH: CPT

## 2018-07-03 PROCEDURE — 96375 TX/PRO/DX INJ NEW DRUG ADDON: CPT

## 2018-07-03 PROCEDURE — 70450 CT HEAD/BRAIN W/O DYE: CPT

## 2018-07-03 PROCEDURE — 81003 URINALYSIS AUTO W/O SCOPE: CPT | Performed by: EMERGENCY MEDICINE

## 2018-07-03 PROCEDURE — 96376 TX/PRO/DX INJ SAME DRUG ADON: CPT

## 2018-07-03 PROCEDURE — 25010000002 ONDANSETRON PER 1 MG: Performed by: EMERGENCY MEDICINE

## 2018-07-03 RX ORDER — FENTANYL CITRATE 50 UG/ML
50 INJECTION, SOLUTION INTRAMUSCULAR; INTRAVENOUS ONCE
Status: COMPLETED | OUTPATIENT
Start: 2018-07-03 | End: 2018-07-03

## 2018-07-03 RX ORDER — ONDANSETRON 4 MG/1
4 TABLET, ORALLY DISINTEGRATING ORAL EVERY 8 HOURS PRN
Qty: 12 TABLET | Refills: 0 | Status: SHIPPED | OUTPATIENT
Start: 2018-07-03 | End: 2018-08-06

## 2018-07-03 RX ADMIN — ONDANSETRON 4 MG: 2 INJECTION, SOLUTION INTRAMUSCULAR; INTRAVENOUS at 00:04

## 2018-07-03 RX ADMIN — METOCLOPRAMIDE 10 MG: 5 INJECTION, SOLUTION INTRAMUSCULAR; INTRAVENOUS at 00:04

## 2018-07-03 RX ADMIN — IOPAMIDOL 100 ML: 755 INJECTION, SOLUTION INTRAVENOUS at 01:16

## 2018-07-03 RX ADMIN — DIPHENHYDRAMINE HYDROCHLORIDE 25 MG: 50 INJECTION, SOLUTION INTRAMUSCULAR; INTRAVENOUS at 00:04

## 2018-07-03 RX ADMIN — SODIUM CHLORIDE 1000 ML: 9 INJECTION, SOLUTION INTRAVENOUS at 00:06

## 2018-07-03 RX ADMIN — FENTANYL CITRATE 50 MCG: 50 INJECTION INTRAMUSCULAR; INTRAVENOUS at 02:00

## 2018-07-03 RX ADMIN — FENTANYL CITRATE 50 MCG: 50 INJECTION INTRAMUSCULAR; INTRAVENOUS at 00:04

## 2018-07-03 NOTE — ED PROVIDER NOTES
Subjective   Patient is a 37-year-old female who presents to the ER with multiple complaints.  Patient states she woke up this morning with nonbloody nausea vomiting and diarrhea.  Patient states she has also felt dizzy.  Patient states that later today she developed a headache located behind both of her eyes.  Headache was achy in nature and not the worst headache of her life.  No thunderclap onset.  Patient also complains of diffuse neck pain.  Patient has a history of cerebral aneurysm in  that required multiple interventions.  Patient states her headache and neck pain with her aneurysm was similar to her pain she had today.  She denies any fever, chest pain, shortness of air, abdominal pain, urinary changes, numbness or weakness.            Review of Systems   Constitutional: Negative.    Eyes: Negative.    Respiratory: Negative.    Cardiovascular: Negative.    Gastrointestinal: Positive for diarrhea, nausea and vomiting.   Endocrine: Negative.    Genitourinary: Negative.    Musculoskeletal: Positive for neck pain.   Skin: Negative.    Allergic/Immunologic: Negative.    Neurological: Positive for dizziness and headaches.   Hematological: Negative.    Psychiatric/Behavioral: Negative.    All other systems reviewed and are negative.      Past Medical History:   Diagnosis Date   • Cerebral aneurysm    • GERD (gastroesophageal reflux disease)    • H/O: hysterectomy    • Hypertension    • Iron deficiency anemia    • Seizure (CMS/HCC)        Allergies   Allergen Reactions   • Infed [Iron Dextran] Anaphylaxis     Throat tightness   • [place] Butter Hives     Allergic to rafiq fruit    • Ampicillin Rash   • Latex Rash   • Lortab [Hydrocodone-Acetaminophen] Rash   • Morphine Rash   • Nitrofurantoin Rash   • Poison Ivy Extract Rash       Past Surgical History:   Procedure Laterality Date   • APPENDECTOMY     •  SECTION      x2   • CHOLECYSTECTOMY     • COLONOSCOPY     • CRANIOTOMY     • ENDOMETRIAL ABLATION      • HYSTERECTOMY         Family History   Problem Relation Age of Onset   • Rheum arthritis Mother    • No Known Problems Brother    • No Known Problems Sister    • No Known Problems Daughter    • No Known Problems Brother    • No Known Problems Brother    • No Known Problems Brother    • No Known Problems Daughter        Social History     Social History   • Marital status: Single     Social History Main Topics   • Smoking status: Never Smoker   • Smokeless tobacco: Never Used   • Alcohol use No   • Drug use: No   • Sexual activity: Yes     Partners: Male     Other Topics Concern   • Not on file           Objective   Physical Exam   Constitutional: She is oriented to person, place, and time. She appears well-developed and well-nourished.   HENT:   Head: Normocephalic and atraumatic.   Eyes: Conjunctivae are normal. Pupils are equal, round, and reactive to light.   Neck: Normal range of motion and full passive range of motion without pain. No neck rigidity. Normal range of motion present.   Cardiovascular: Normal rate, regular rhythm and normal heart sounds.    Pulmonary/Chest: Effort normal and breath sounds normal.   Abdominal: Soft. There is no tenderness.   Musculoskeletal: Normal range of motion. She exhibits no edema or deformity.   Neurological: She is alert and oriented to person, place, and time. She has normal strength. No cranial nerve deficit or sensory deficit.   Skin: Skin is warm.   Psychiatric: She has a normal mood and affect. Her behavior is normal.   Nursing note and vitals reviewed.      Procedures           ED Course      ECG:Normal sinus rhythm with a rate of 84, no acute ischemia or infarction    Patient was given IV fluids, fentanyl, Reglan and Benadryl.  Symptoms improved with treatment.    Lab Results (last 24 hours)     Procedure Component Value Units Date/Time    CBC & Differential [[id number]] Collected:  07/02/18 2228    Specimen:  Blood Updated:  07/03/18 0007    Narrative:       The  following orders were created for panel order CBC & Differential.  Procedure                               Abnormality         Status                     ---------                               -----------         ------                     CBC Auto Differential[[id number]]        Abnormal            Final result                 Please view results for these tests on the individual orders.    Lipase [[id number]]  (Normal) Collected:  07/02/18 2354    Specimen:  Blood Updated:  07/03/18 0011     Lipase 125 U/L     CBC Auto Differential [[id number]]  (Abnormal) Collected:  07/02/18 2354    Specimen:  Blood Updated:  07/03/18 0007     WBC 10.28 10*3/mm3      RBC 4.58 10*6/mm3      Hemoglobin 12.5 g/dL      Hematocrit 38.5 %      MCV 84.1 fL      MCH 27.3 (L) pg      MCHC 32.5 (L) g/dL      RDW 14.1 %      RDW-SD 43.7 fl      MPV 11.0 fL      Platelets 265 10*3/mm3      Neutrophil % 70.2 %      Lymphocyte % 21.9 %      Monocyte % 5.8 %      Eosinophil % 1.3 %      Basophil % 0.3 %      Immature Grans % 0.5 %      Neutrophils, Absolute 7.22 10*3/mm3      Lymphocytes, Absolute 2.25 10*3/mm3      Monocytes, Absolute 0.60 10*3/mm3      Eosinophils, Absolute 0.13 10*3/mm3      Basophils, Absolute 0.03 10*3/mm3      Immature Grans, Absolute 0.05 (H) 10*3/mm3      nRBC 0.0 /100 WBC     Urinalysis With Culture If Indicated - Urine, Clean Catch [[id number]]  (Normal) Collected:  07/03/18 0011    Specimen:  Urine from Urine, Clean Catch Updated:  07/03/18 0020     Color, UA Yellow     Appearance, UA Clear     pH, UA 6.0     Specific Gravity, UA 1.017     Glucose, UA Negative     Ketones, UA Negative     Bilirubin, UA Negative     Blood, UA Negative     Protein, UA Negative     Leuk Esterase, UA Negative     Nitrite, UA Negative     Urobilinogen, UA 0.2 E.U./dL    Narrative:       Urine microscopic not indicated.    POC Pregnancy, Urine [[id number]]  (Normal) Collected:  07/03/18 0014    Specimen:  Urine Updated:  07/03/18 0014      HCG, Urine, QL Negative     Lot Number \[id number]\     Internal Positive Control Positive     Internal Negative Control Negative    Comprehensive Metabolic Panel [[id number]]  (Abnormal) Collected:  07/03/18 0031    Specimen:  Blood Updated:  07/03/18 0050     Glucose 106 (H) mg/dL      BUN 15 mg/dL      Creatinine 0.69 mg/dL      Sodium 140 mmol/L      Potassium 3.4 (L) mmol/L      Chloride 105 mmol/L      CO2 24.0 mmol/L      Calcium 9.1 mg/dL      Total Protein 7.0 g/dL      Albumin 3.80 g/dL      ALT (SGPT) 25 U/L      AST (SGOT) 24 U/L      Alkaline Phosphatase 65 U/L      Total Bilirubin 0.3 mg/dL      eGFR  African Amer 116 mL/min/1.73      Globulin 3.2 gm/dL      A/G Ratio 1.2 g/dL      BUN/Creatinine Ratio 21.7     Anion Gap 11.0 mmol/L         Labs are unremarkable.  CT scan of the head showed no acute findings.  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  Patient was feeling better.  I offered a lumbar puncture to the patient.  After risk and benefits were explained the patient refused.  She understands we could be missing a very small intracranial hemorrhage and meningitis but still refused.  Patient will be discharged home to follow-up with her PCP and neurologist.  She is return immediately for any worsening or new symptoms or if she changes her mind about the lumbar puncture.  Patient will be discharged home with zofran.          [facility]      Final diagnoses:   Viral gastroenteritis   Acute nonintractable headache, unspecified headache type            Kassy Byrd MD  07/03/18 8096

## 2018-07-05 ENCOUNTER — TRANSCRIBE ORDERS (OUTPATIENT)
Dept: ADMINISTRATIVE | Facility: HOSPITAL | Age: 38
End: 2018-07-05

## 2018-07-05 DIAGNOSIS — I69.851 HEMIPLEGIA AND HEMIPARESIS FOLLOWING OTHER CEREBROVASCULAR DISEASE AFFECTING RIGHT DOMINANT SIDE (HCC): Primary | ICD-10-CM

## 2018-07-09 NOTE — ED NOTES
"ED Call Back Questions    1. How are you doing since leaving the Emergency Department?    Already saw pcp, scheduled for a MRI.  2. Do you have any questions about your discharge instructions? No     3. Have you filled your new prescriptions yet? Yes   a. Do you have any questions about those medications? No     4. Were you able to make a follow-up appointment with the physician? Yes     5. Do you have a primary care physician? Yes   a. If No, would you like for me to set you up with one? N/A  i. If Yes, “I will have our ED  give you a call right back at this number to work with you on the best time for an appointment.”    6. We are always looking to get better at what we do. Do you have any suggestions for what we can do to be even better? No   a. If Yes, \"Thank you for sharing your concerns. I apologize. I will follow up with our manager and patient . Would you like someone to call you back?\" N/A    7. Is there anything else I can do for you? No     "

## 2018-07-10 ENCOUNTER — TELEPHONE (OUTPATIENT)
Dept: NEUROLOGY | Facility: CLINIC | Age: 38
End: 2018-07-10

## 2018-07-10 ENCOUNTER — APPOINTMENT (OUTPATIENT)
Dept: MRI IMAGING | Facility: HOSPITAL | Age: 38
End: 2018-07-10
Attending: INTERNAL MEDICINE

## 2018-07-10 NOTE — TELEPHONE ENCOUNTER
Duncan wanted Keeley to explain why a MRI was ordered.  I explained that Dr. Cuevas had ordered the MRI and she should call his office and ask why the MRI was ordered.

## 2018-07-11 ENCOUNTER — HOSPITAL ENCOUNTER (OUTPATIENT)
Dept: MRI IMAGING | Facility: HOSPITAL | Age: 38
Discharge: HOME | End: 2018-07-11
Attending: INTERNAL MEDICINE | Admitting: INTERNAL MEDICINE

## 2018-07-11 DIAGNOSIS — I69.851 HEMIPLEGIA AND HEMIPARESIS FOLLOWING OTHER CEREBROVASCULAR DISEASE AFFECTING RIGHT DOMINANT SIDE (HCC): ICD-10-CM

## 2018-07-11 LAB — CREAT BLDA-MCNC: 0.7 MG/DL (ref 0.6–1.3)

## 2018-07-11 PROCEDURE — A9577 INJ MULTIHANCE: HCPCS | Performed by: INTERNAL MEDICINE

## 2018-07-11 PROCEDURE — 82565 ASSAY OF CREATININE: CPT

## 2018-07-11 PROCEDURE — 0 GADOBENATE DIMEGLUMINE 529 MG/ML SOLUTION: Performed by: INTERNAL MEDICINE

## 2018-07-11 PROCEDURE — 70553 MRI BRAIN STEM W/O & W/DYE: CPT

## 2018-07-11 RX ADMIN — GADOBENATE DIMEGLUMINE 15 ML: 529 INJECTION, SOLUTION INTRAVENOUS at 14:30

## 2018-07-13 ENCOUNTER — APPOINTMENT (OUTPATIENT)
Dept: MRI IMAGING | Facility: HOSPITAL | Age: 38
End: 2018-07-13
Attending: INTERNAL MEDICINE

## 2018-08-06 ENCOUNTER — HOSPITAL ENCOUNTER (OUTPATIENT)
Dept: GENERAL RADIOLOGY | Facility: HOSPITAL | Age: 38
Discharge: HOME | End: 2018-08-06
Attending: FAMILY MEDICINE | Admitting: FAMILY MEDICINE

## 2018-08-06 ENCOUNTER — HOSPITAL ENCOUNTER (OUTPATIENT)
Dept: GENERAL RADIOLOGY | Facility: HOSPITAL | Age: 38
Discharge: HOME | End: 2018-08-06
Attending: FAMILY MEDICINE

## 2018-08-06 PROCEDURE — 80053 COMPREHEN METABOLIC PANEL: CPT | Performed by: FAMILY MEDICINE

## 2018-08-06 PROCEDURE — 85025 COMPLETE CBC W/AUTO DIFF WBC: CPT | Performed by: FAMILY MEDICINE

## 2018-08-06 PROCEDURE — 83690 ASSAY OF LIPASE: CPT | Performed by: FAMILY MEDICINE

## 2018-08-06 PROCEDURE — 73630 X-RAY EXAM OF FOOT: CPT

## 2018-08-06 PROCEDURE — 73610 X-RAY EXAM OF ANKLE: CPT

## 2018-08-06 PROCEDURE — 82150 ASSAY OF AMYLASE: CPT | Performed by: FAMILY MEDICINE

## 2018-08-23 ENCOUNTER — TRANSCRIBE ORDERS (OUTPATIENT)
Dept: ADMINISTRATIVE | Facility: HOSPITAL | Age: 38
End: 2018-08-23

## 2018-08-23 DIAGNOSIS — D50.9 IRON DEFICIENCY ANEMIA, UNSPECIFIED IRON DEFICIENCY ANEMIA TYPE: ICD-10-CM

## 2018-08-23 DIAGNOSIS — R00.2 PALPITATIONS: Primary | ICD-10-CM

## 2018-08-23 DIAGNOSIS — E55.9 VITAMIN D DEFICIENCY: ICD-10-CM

## 2018-08-23 DIAGNOSIS — R53.83 OTHER FATIGUE: ICD-10-CM

## 2018-08-24 ENCOUNTER — TRANSCRIBE ORDERS (OUTPATIENT)
Dept: ONCOLOGY | Facility: HOSPITAL | Age: 38
End: 2018-08-24

## 2018-08-24 ENCOUNTER — LAB (OUTPATIENT)
Dept: LAB | Facility: HOSPITAL | Age: 38
End: 2018-08-24

## 2018-08-24 DIAGNOSIS — D50.9 IRON DEFICIENCY ANEMIA, UNSPECIFIED IRON DEFICIENCY ANEMIA TYPE: ICD-10-CM

## 2018-08-24 DIAGNOSIS — D50.9 IRON DEFICIENCY ANEMIA, UNSPECIFIED IRON DEFICIENCY ANEMIA TYPE: Primary | ICD-10-CM

## 2018-08-24 LAB
25(OH)D3 SERPL-MCNC: 42.6 NG/ML (ref 30–100)
DEPRECATED RDW RBC AUTO: 46.5 FL (ref 40–54)
ERYTHROCYTE [DISTWIDTH] IN BLOOD BY AUTOMATED COUNT: 14.6 % (ref 12–15)
FERRITIN SERPL-MCNC: 172 NG/ML (ref 6.24–137)
HCT VFR BLD AUTO: 40.5 % (ref 37–47)
HGB BLD-MCNC: 13.3 G/DL (ref 12–16)
IRON 24H UR-MRATE: 103 MCG/DL (ref 42–180)
IRON SATN MFR SERPL: 39 % (ref 20–45)
MCH RBC QN AUTO: 28.2 PG (ref 28–32)
MCHC RBC AUTO-ENTMCNC: 32.8 G/DL (ref 33–36)
MCV RBC AUTO: 85.8 FL (ref 82–98)
PLATELET # BLD AUTO: 319 10*3/MM3 (ref 130–400)
PMV BLD AUTO: 11.1 FL (ref 6–12)
RBC # BLD AUTO: 4.72 10*6/MM3 (ref 4.2–5.4)
TIBC SERPL-MCNC: 265 MCG/DL (ref 225–420)
WBC NRBC COR # BLD: 10.11 10*3/MM3 (ref 4.8–10.8)

## 2018-08-24 PROCEDURE — 83540 ASSAY OF IRON: CPT

## 2018-08-24 PROCEDURE — 82728 ASSAY OF FERRITIN: CPT

## 2018-08-24 PROCEDURE — 82306 VITAMIN D 25 HYDROXY: CPT

## 2018-08-24 PROCEDURE — 83550 IRON BINDING TEST: CPT

## 2018-08-24 PROCEDURE — 85027 COMPLETE CBC AUTOMATED: CPT

## 2018-09-25 ENCOUNTER — TRANSCRIBE ORDERS (OUTPATIENT)
Dept: ADMINISTRATIVE | Facility: HOSPITAL | Age: 38
End: 2018-09-25

## 2018-09-25 DIAGNOSIS — S82.92XA UNSPECIFIED FRACTURE OF LEFT LOWER LEG, INITIAL ENCOUNTER FOR CLOSED FRACTURE: Primary | ICD-10-CM

## 2018-09-27 ENCOUNTER — HOSPITAL ENCOUNTER (OUTPATIENT)
Dept: BONE DENSITY | Facility: HOSPITAL | Age: 38
Discharge: HOME | End: 2018-09-27
Attending: INTERNAL MEDICINE | Admitting: INTERNAL MEDICINE

## 2018-09-27 DIAGNOSIS — S82.92XA UNSPECIFIED FRACTURE OF LEFT LOWER LEG, INITIAL ENCOUNTER FOR CLOSED FRACTURE: ICD-10-CM

## 2018-09-27 PROCEDURE — 77080 DXA BONE DENSITY AXIAL: CPT

## 2018-12-20 ENCOUNTER — HOSPITAL ENCOUNTER (EMERGENCY)
Facility: HOSPITAL | Age: 38
Discharge: HOME | End: 2018-12-20
Admitting: EMERGENCY MEDICINE

## 2018-12-20 VITALS
OXYGEN SATURATION: 99 % | WEIGHT: 170 LBS | TEMPERATURE: 98.9 F | SYSTOLIC BLOOD PRESSURE: 137 MMHG | DIASTOLIC BLOOD PRESSURE: 84 MMHG | RESPIRATION RATE: 18 BRPM | HEIGHT: 63 IN | BODY MASS INDEX: 30.12 KG/M2 | HEART RATE: 97 BPM

## 2018-12-20 DIAGNOSIS — T78.40XA ALLERGIC REACTION, INITIAL ENCOUNTER: Primary | ICD-10-CM

## 2018-12-20 PROCEDURE — 25010000002 DIPHENHYDRAMINE PER 50 MG: Performed by: PHYSICIAN ASSISTANT

## 2018-12-20 PROCEDURE — 96375 TX/PRO/DX INJ NEW DRUG ADDON: CPT

## 2018-12-20 PROCEDURE — 96374 THER/PROPH/DIAG INJ IV PUSH: CPT

## 2018-12-20 PROCEDURE — 25010000002 METHYLPREDNISOLONE PER 125 MG: Performed by: PHYSICIAN ASSISTANT

## 2018-12-20 PROCEDURE — 99283 EMERGENCY DEPT VISIT LOW MDM: CPT

## 2018-12-20 RX ORDER — DIPHENHYDRAMINE HYDROCHLORIDE 50 MG/ML
50 INJECTION INTRAMUSCULAR; INTRAVENOUS ONCE
Status: COMPLETED | OUTPATIENT
Start: 2018-12-20 | End: 2018-12-20

## 2018-12-20 RX ORDER — METHYLPREDNISOLONE SODIUM SUCCINATE 125 MG/2ML
125 INJECTION, POWDER, LYOPHILIZED, FOR SOLUTION INTRAMUSCULAR; INTRAVENOUS ONCE
Status: COMPLETED | OUTPATIENT
Start: 2018-12-20 | End: 2018-12-20

## 2018-12-20 RX ORDER — PREDNISONE 20 MG/1
20 TABLET ORAL 2 TIMES DAILY
Qty: 8 TABLET | Refills: 0 | Status: SHIPPED | OUTPATIENT
Start: 2018-12-20 | End: 2018-12-24

## 2018-12-20 RX ORDER — FAMOTIDINE 10 MG/ML
20 INJECTION, SOLUTION INTRAVENOUS ONCE
Status: COMPLETED | OUTPATIENT
Start: 2018-12-20 | End: 2018-12-20

## 2018-12-20 RX ADMIN — METHYLPREDNISOLONE SODIUM SUCCINATE 125 MG: 125 INJECTION, POWDER, FOR SOLUTION INTRAMUSCULAR; INTRAVENOUS at 17:45

## 2018-12-20 RX ADMIN — FAMOTIDINE 20 MG: 10 INJECTION, SOLUTION INTRAVENOUS at 17:50

## 2018-12-20 RX ADMIN — DIPHENHYDRAMINE HYDROCHLORIDE 50 MG: 50 INJECTION INTRAMUSCULAR; INTRAVENOUS at 17:48

## 2018-12-21 NOTE — ED PROVIDER NOTES
Subjective     History provided by:  Patient   used: No    Allergic Reaction   Presenting symptoms: itching and rash    Presenting symptoms: no difficulty breathing, no difficulty swallowing, no swelling and no wheezing    Itching:     Location:  Arm    Severity:  Moderate  Rash:     Location:  Back, leg, abdomen, arm and chest    Quality: itchiness      Severity:  Moderate    Onset quality:  Sudden    Timing:  Constant    Progression:  Worsening  Severity:  Moderate  Duration:  10 minutes  Prior allergic episodes:  Food/nut allergies  Context: food and nuts    Relieved by:  None tried  Ineffective treatments:  None tried      Review of Systems   Constitutional: Negative for chills and fever.   HENT: Negative for congestion, sore throat and trouble swallowing.    Respiratory: Negative for cough, shortness of breath and wheezing.    Cardiovascular: Negative for chest pain and palpitations.   Gastrointestinal: Negative for abdominal pain, nausea and vomiting.   Genitourinary: Negative for dysuria and hematuria.   Musculoskeletal: Negative for arthralgias and neck pain.   Skin: Positive for itching and rash. Negative for wound.   Allergic/Immunologic: Positive for food allergies.   Neurological: Negative for dizziness, weakness and headaches.   Hematological: Negative for adenopathy. Does not bruise/bleed easily.       Past Medical History:   Diagnosis Date   • Cerebral aneurysm    • GERD (gastroesophageal reflux disease)    • H/O: hysterectomy    • Hypertension    • Iron deficiency anemia    • Seizure (CMS/HCC)        Allergies   Allergen Reactions   • Infed [Iron Dextran] Anaphylaxis     Throat tightness   • Rafiq Butter Hives     Allergic to rafiq fruit    • Ampicillin Rash   • Lamotrigine Rash   • Latex Rash   • Lortab [Hydrocodone-Acetaminophen] Rash   • Morphine Rash   • Nitrofurantoin Rash   • Nuts Rash   • Poison Ivy Extract Rash       Past Surgical History:   Procedure Laterality Date   •  APPENDECTOMY     •  SECTION      x2   • CHOLECYSTECTOMY     • COLONOSCOPY     • CRANIOTOMY     • ENDOMETRIAL ABLATION     • HYSTERECTOMY         Family History   Problem Relation Age of Onset   • Rheum arthritis Mother    • No Known Problems Brother    • No Known Problems Sister    • No Known Problems Daughter    • No Known Problems Brother    • No Known Problems Brother    • No Known Problems Brother    • No Known Problems Daughter        Social History     Socioeconomic History   • Marital status: Single     Spouse name: Not on file   • Number of children: Not on file   • Years of education: Not on file   • Highest education level: Not on file   Tobacco Use   • Smoking status: Never Smoker   • Smokeless tobacco: Never Used   Substance and Sexual Activity   • Alcohol use: No   • Drug use: No   • Sexual activity: Yes     Partners: Male       Lab Results (last 24 hours)     ** No results found for the last 24 hours. **          Objective   Physical Exam   Constitutional: She is oriented to person, place, and time. She appears well-developed and well-nourished. No distress.   HENT:   Head: Normocephalic and atraumatic.   Right Ear: External ear normal.   Left Ear: External ear normal.   Mouth/Throat: Oropharynx is clear and moist.   Eyes: Conjunctivae and EOM are normal. Pupils are equal, round, and reactive to light.   Neck: Normal range of motion.   Cardiovascular: Normal rate, regular rhythm, normal heart sounds and intact distal pulses. Exam reveals no friction rub.   No murmur heard.  Pulmonary/Chest: Effort normal and breath sounds normal. No respiratory distress. She has no wheezes. She has no rales. She exhibits no tenderness.   No pulmonary issues, no stridor, no wheezing, no tongue swelling.  Handling secretions well.   Abdominal: Soft. Bowel sounds are normal. She exhibits no distension and no mass. There is no tenderness. There is no rebound and no guarding.   Musculoskeletal: Normal range of  "motion.   Neurological: She is alert and oriented to person, place, and time.   Skin: Skin is warm and dry. Capillary refill takes less than 2 seconds. Rash noted. Rash is urticarial. She is not diaphoretic.   Urticarial rash across bilateral upper extremities, anterior chest wall, abdominal wall.  Upper back, bilateral upper thighs.   Psychiatric: She has a normal mood and affect. Her behavior is normal.   Nursing note and vitals reviewed.      Procedures         No orders to display       /86   Pulse 97   Temp 98.4 °F (36.9 °C)   Resp 18   Ht 160 cm (63\")   Wt 77.1 kg (170 lb)   SpO2 99%   BMI 30.11 kg/m²     ED Course    ED Course as of Dec 20 2127   Thu Dec 20, 2018   1924 Discussed with Dr. Kirkpatrick, recommends obs for 3 hours.   [CP]      ED Course User Index  [CP] Mathew Frazier PA-C       Medications   diphenhydrAMINE (BENADRYL) injection 50 mg (50 mg Intravenous Given 12/20/18 1748)   famotidine (PEPCID) injection 20 mg (20 mg Intravenous Given 12/20/18 1750)   methylPREDNISolone sodium succinate (SOLU-Medrol) injection 125 mg (125 mg Intravenous Given 12/20/18 1745)            MDM  Number of Diagnoses or Management Options  Allergic reaction, initial encounter:   Diagnosis management comments: 88-year-old with allergic reaction to peanuts and a chocolate chip cookie.  Patient has known peanut allergy that did not know if they were in the chocolate chip cookies.  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  Patient received steroids, Benadryl, Pepcid with prompt relief of hives.  There was never any rest for compromise including stridor, wheezing, angioedema or tongue swelling, abdominal pain, nausea or vomiting.  Patient also had mild itching of the upper extremities.  Resolved with treatment here.  We will prescribe short course of steroids for outpatient therapy.  Discussed possible rebound reaction.  Patient is in verbal agreement to return for any new, " worsening or other concerning symptoms.  Scuffs this case with Dr. Kirkpatrick who agrees with plan at this time.    Risk of Complications, Morbidity, and/or Mortality  Presenting problems: moderate  Diagnostic procedures: moderate  Management options: moderate    Patient Progress  Patient progress: improved      Final diagnoses:   Allergic reaction, initial encounter          Mathew Frazier PA-C  12/20/18 2127

## 2018-12-21 NOTE — DISCHARGE INSTRUCTIONS
Please take the prednisone for the next 4 days morning and night as discussed.  Please be on watch for return of your symptoms.  Follow up with your primary care provider as needed.  Return to the ER immediately for any new, worsening or other concerning symptoms such as      You have symptoms of anaphylaxis, such as:  Swollen mouth, tongue, or throat.  Pain or tightness in your chest.  Trouble breathing or shortness of breath.  Dizziness or fainting.  Severe abdominal pain, vomiting, or diarrhea.

## 2019-08-21 ENCOUNTER — HOSPITAL ENCOUNTER (EMERGENCY)
Age: 39
Discharge: HOME | End: 2019-08-21
Payer: COMMERCIAL

## 2019-08-21 VITALS
WEIGHT: 172 LBS | HEIGHT: 63 IN | TEMPERATURE: 98.3 F | BODY MASS INDEX: 30.48 KG/M2 | DIASTOLIC BLOOD PRESSURE: 73 MMHG | SYSTOLIC BLOOD PRESSURE: 111 MMHG | RESPIRATION RATE: 16 BRPM | OXYGEN SATURATION: 94 % | HEART RATE: 72 BPM

## 2019-08-21 DIAGNOSIS — N39.0 URINARY TRACT INFECTION WITHOUT HEMATURIA, SITE UNSPECIFIED: Primary | ICD-10-CM

## 2019-08-21 LAB
ALBUMIN SERPL-MCNC: 4.4 G/DL (ref 3.5–5.2)
ALP BLD-CCNC: 66 U/L (ref 35–104)
ALT SERPL-CCNC: 15 U/L (ref 5–33)
ANION GAP SERPL CALCULATED.3IONS-SCNC: 10 MMOL/L (ref 7–19)
AST SERPL-CCNC: 18 U/L (ref 5–32)
BACTERIA: ABNORMAL /HPF
BASOPHILS ABSOLUTE: 0 K/UL (ref 0–0.2)
BASOPHILS RELATIVE PERCENT: 0.4 % (ref 0–1)
BILIRUB SERPL-MCNC: 0.4 MG/DL (ref 0.2–1.2)
BILIRUBIN URINE: NEGATIVE
BLOOD, URINE: NEGATIVE
BUN BLDV-MCNC: 21 MG/DL (ref 6–20)
CALCIUM SERPL-MCNC: 9.6 MG/DL (ref 8.6–10)
CHLORIDE BLD-SCNC: 103 MMOL/L (ref 98–111)
CLARITY: ABNORMAL
CO2: 26 MMOL/L (ref 22–29)
COLOR: YELLOW
CREAT SERPL-MCNC: 0.8 MG/DL (ref 0.5–0.9)
EOSINOPHILS ABSOLUTE: 0.2 K/UL (ref 0–0.6)
EOSINOPHILS RELATIVE PERCENT: 2.1 % (ref 0–5)
EPITHELIAL CELLS, UA: 5 /HPF (ref 0–5)
GFR NON-AFRICAN AMERICAN: >60
GLUCOSE BLD-MCNC: 110 MG/DL (ref 74–109)
GLUCOSE URINE: NEGATIVE MG/DL
HCT VFR BLD CALC: 38.9 % (ref 37–47)
HEMOGLOBIN: 12.4 G/DL (ref 12–16)
HYALINE CASTS: 5 /HPF (ref 0–8)
IMMATURE GRANULOCYTES #: 0.1 K/UL
KETONES, URINE: NEGATIVE MG/DL
LEUKOCYTE ESTERASE, URINE: ABNORMAL
LIPASE: 56 U/L (ref 13–60)
LYMPHOCYTES ABSOLUTE: 4.7 K/UL (ref 1.1–4.5)
LYMPHOCYTES RELATIVE PERCENT: 43.9 % (ref 20–40)
MCH RBC QN AUTO: 27.6 PG (ref 27–31)
MCHC RBC AUTO-ENTMCNC: 31.9 G/DL (ref 33–37)
MCV RBC AUTO: 86.6 FL (ref 81–99)
MONOCYTES ABSOLUTE: 0.7 K/UL (ref 0–0.9)
MONOCYTES RELATIVE PERCENT: 6.1 % (ref 0–10)
NEUTROPHILS ABSOLUTE: 5 K/UL (ref 1.5–7.5)
NEUTROPHILS RELATIVE PERCENT: 46.8 % (ref 50–65)
NITRITE, URINE: NEGATIVE
PDW BLD-RTO: 14.9 % (ref 11.5–14.5)
PH UA: 8 (ref 5–8)
PLATELET # BLD: 308 K/UL (ref 130–400)
PMV BLD AUTO: 10.4 FL (ref 9.4–12.3)
POTASSIUM SERPL-SCNC: 3.9 MMOL/L (ref 3.5–5)
PROTEIN UA: NEGATIVE MG/DL
RBC # BLD: 4.49 M/UL (ref 4.2–5.4)
RBC UA: 4 /HPF (ref 0–4)
SODIUM BLD-SCNC: 139 MMOL/L (ref 136–145)
SPECIFIC GRAVITY UA: 1.02 (ref 1–1.03)
TOTAL PROTEIN: 7.6 G/DL (ref 6.6–8.7)
URINE REFLEX TO CULTURE: YES
UROBILINOGEN, URINE: 0.2 E.U./DL
WBC # BLD: 10.7 K/UL (ref 4.8–10.8)
WBC UA: 25 /HPF (ref 0–5)

## 2019-08-21 PROCEDURE — 80053 COMPREHEN METABOLIC PANEL: CPT

## 2019-08-21 PROCEDURE — 87086 URINE CULTURE/COLONY COUNT: CPT

## 2019-08-21 PROCEDURE — 85025 COMPLETE CBC W/AUTO DIFF WBC: CPT

## 2019-08-21 PROCEDURE — 36415 COLL VENOUS BLD VENIPUNCTURE: CPT

## 2019-08-21 PROCEDURE — 6360000002 HC RX W HCPCS: Performed by: NURSE PRACTITIONER

## 2019-08-21 PROCEDURE — 99283 EMERGENCY DEPT VISIT LOW MDM: CPT

## 2019-08-21 PROCEDURE — 83690 ASSAY OF LIPASE: CPT

## 2019-08-21 PROCEDURE — 81001 URINALYSIS AUTO W/SCOPE: CPT

## 2019-08-21 RX ORDER — FLUCONAZOLE 150 MG/1
150 TABLET ORAL ONCE
Qty: 1 TABLET | Refills: 0 | Status: SHIPPED | OUTPATIENT
Start: 2019-08-21 | End: 2019-08-21

## 2019-08-21 RX ORDER — KETOROLAC TROMETHAMINE 30 MG/ML
30 INJECTION, SOLUTION INTRAMUSCULAR; INTRAVENOUS ONCE
Status: COMPLETED | OUTPATIENT
Start: 2019-08-21 | End: 2019-08-21

## 2019-08-21 RX ORDER — TOPIRAMATE 25 MG/1
100 TABLET ORAL 2 TIMES DAILY
COMMUNITY

## 2019-08-21 RX ORDER — SULFAMETHOXAZOLE AND TRIMETHOPRIM 800; 160 MG/1; MG/1
1 TABLET ORAL 2 TIMES DAILY
Qty: 14 TABLET | Refills: 0 | Status: SHIPPED | OUTPATIENT
Start: 2019-08-21 | End: 2019-08-28

## 2019-08-21 RX ADMIN — KETOROLAC TROMETHAMINE 30 MG: 30 INJECTION, SOLUTION INTRAMUSCULAR at 22:34

## 2019-08-21 ASSESSMENT — PAIN SCALES - GENERAL: PAINLEVEL_OUTOF10: 8

## 2019-08-22 ASSESSMENT — ENCOUNTER SYMPTOMS
ABDOMINAL PAIN: 1
CONSTIPATION: 0
VOMITING: 0
DIARRHEA: 0

## 2019-08-22 NOTE — ED PROVIDER NOTES
[address] [facility] EMERGENCY DEPT  eMERGENCY dEPARTMENT eNCOUnter      Pt Name: Andie Drummond  MRN: [id number]  [place] 1980  Date of evaluation: 8/21/2019  Provider: KURT Rivero    CHIEF COMPLAINT       Chief Complaint   Patient presents with    Abdominal Pain         HISTORY OF PRESENT ILLNESS   (Location/Symptom, Timing/Onset,Context/Setting, Quality, Duration, Modifying Factors, Severity)  Note limiting factors. Andie Drummond is a 45 y.o. female who presents to the emergency department with r sided abd cramping x 2 weeks. Pt denies a fever. No vomiting. No dysuria. Pt has had a total hysterectomy, appendectomy, and cholecystectomy. She denies any change in bowel habits. HAs gotten worse today     The history is provided by the patient. Abdominal Pain   Pain location:  R flank  Pain quality: cramping    Pain radiates to:  Back  Pain severity:  Moderate  Onset quality:  Sudden  Timing:  Constant  Progression:  Waxing and waning  Chronicity:  New  Associated symptoms: no anorexia, no constipation, no diarrhea, no dysuria, no vaginal bleeding and no vomiting        NursingNotes were reviewed. REVIEW OF SYSTEMS    (2-9 systems for level 4, 10 or more for level 5)     Review of Systems   Gastrointestinal: Positive for abdominal pain. Negative for anorexia, constipation, diarrhea and vomiting. Genitourinary: Negative for dysuria and vaginal bleeding. Except as noted above the remainder of the review of systems was reviewed and negative.        PAST MEDICAL HISTORY     Past Medical History:   Diagnosis Date    Abdominal pain     Acid reflux     Acute bronchitis     Anemia     Anxiety     Asthma     Chronic back pain     Constipation     Depression     Headache(784.0)     Hyperlipidemia     Hypermenorrhea     Insomnia     Intracranial aneurysm     Overweight     Prolonged emergence from general anesthesia     Seizures (HCC)     UTI (urinary tract infection)     Vagina, Neurological: She is alert. Psychiatric: She has a normal mood and affect. Her behavior is normal.   Nursing note and vitals reviewed. DIAGNOSTIC RESULTS     EKG: All EKG's are interpreted by the Emergency Department Physician who either signs or Co-signsthis chart in the absence of a cardiologist.        RADIOLOGY:   Cinthya Rasher such as CT, Ultrasound and MRI are read by the radiologist. Plain radiographic images are visualized and preliminarily interpreted by the emergency physician with the below findings:      Interpretation per the Radiologist below, if available at the time of this note:    No orders to display         ED BEDSIDEULTRASOUND:   Performed by ED Physician -none    LABS:  Labs Reviewed   CBC WITH AUTO DIFFERENTIAL - Abnormal; Notable for the following components:       Result Value    MCHC 31.9 (*)     RDW 14.9 (*)     Neutrophils % 46.8 (*)     Lymphocytes % 43.9 (*)     Lymphocytes # 4.7 (*)     All other components within normal limits   COMPREHENSIVE METABOLIC PANEL - Abnormal; Notable for the following components:    Glucose 110 (*)     BUN 21 (*)     All other components within normal limits   URINE RT REFLEX TO CULTURE - Abnormal; Notable for the following components:    Clarity, UA TURBID (*)     Leukocyte Esterase, Urine MODERATE (*)     All other components within normal limits   MICROSCOPIC URINALYSIS - Abnormal; Notable for the following components:    WBC, UA 25 (*)     All other components within normal limits   URINE CULTURE   LIPASE       All other labs were within normal range or not returned as of this dictation.     EMERGENCY DEPARTMENT COURSE and DIFFERENTIALDIAGNOSIS/MDM:   Vitals:    Vitals:    08/21/19 2131 08/21/19 2201 08/21/19 2202 08/21/19 2231   BP: 93/73 104/76  111/73   Pulse:    72   Resp:    16   Temp:   98.3 °F (36.8 °C) 98.3 °F (36.8 °C)   TempSrc:   Oral Oral   SpO2: 92% 94%  94%   Weight:       Height:               MDM  Number of Diagnoses

## 2019-08-23 LAB — URINE CULTURE, ROUTINE: NORMAL

## 2019-08-26 ENCOUNTER — TRANSCRIBE ORDERS (OUTPATIENT)
Dept: ADMINISTRATIVE | Facility: HOSPITAL | Age: 39
End: 2019-08-26

## 2019-08-26 DIAGNOSIS — Z00.00 ENCOUNTER FOR GENERAL ADULT MEDICAL EXAMINATION WITHOUT ABNORMAL FINDINGS: Primary | ICD-10-CM

## 2019-08-28 ENCOUNTER — TRANSCRIBE ORDERS (OUTPATIENT)
Dept: ADMINISTRATIVE | Facility: HOSPITAL | Age: 39
End: 2019-08-28

## 2019-08-28 DIAGNOSIS — R10.9 ABDOMINAL PAIN, UNSPECIFIED ABDOMINAL LOCATION: Primary | ICD-10-CM

## 2019-09-03 ENCOUNTER — HOSPITAL ENCOUNTER (OUTPATIENT)
Dept: ULTRASOUND IMAGING | Facility: HOSPITAL | Age: 39
Discharge: HOME | End: 2019-09-03

## 2019-09-03 ENCOUNTER — HOSPITAL ENCOUNTER (OUTPATIENT)
Dept: ULTRASOUND IMAGING | Facility: HOSPITAL | Age: 39
Discharge: HOME | End: 2019-09-03
Admitting: INTERNAL MEDICINE

## 2019-09-03 ENCOUNTER — APPOINTMENT (OUTPATIENT)
Dept: ULTRASOUND IMAGING | Facility: HOSPITAL | Age: 39
End: 2019-09-03

## 2019-09-03 DIAGNOSIS — R10.9 ABDOMINAL PAIN, UNSPECIFIED ABDOMINAL LOCATION: ICD-10-CM

## 2019-09-03 PROCEDURE — 76856 US EXAM PELVIC COMPLETE: CPT

## 2019-09-03 PROCEDURE — 76830 TRANSVAGINAL US NON-OB: CPT

## 2019-09-03 PROCEDURE — 76775 US EXAM ABDO BACK WALL LIM: CPT

## 2019-12-18 ENCOUNTER — HOSPITAL ENCOUNTER (EMERGENCY)
Facility: HOSPITAL | Age: 39
Discharge: HOME | End: 2019-12-18
Attending: EMERGENCY MEDICINE | Admitting: EMERGENCY MEDICINE

## 2019-12-18 VITALS
WEIGHT: 180 LBS | BODY MASS INDEX: 31.89 KG/M2 | HEART RATE: 78 BPM | HEIGHT: 63 IN | DIASTOLIC BLOOD PRESSURE: 82 MMHG | RESPIRATION RATE: 16 BRPM | TEMPERATURE: 98.2 F | SYSTOLIC BLOOD PRESSURE: 136 MMHG | OXYGEN SATURATION: 100 %

## 2019-12-18 DIAGNOSIS — R51.9 ACUTE NONINTRACTABLE HEADACHE, UNSPECIFIED HEADACHE TYPE: Primary | ICD-10-CM

## 2019-12-18 PROCEDURE — 99282 EMERGENCY DEPT VISIT SF MDM: CPT

## 2019-12-18 PROCEDURE — 96372 THER/PROPH/DIAG INJ SC/IM: CPT

## 2019-12-18 PROCEDURE — 25010000002 ONDANSETRON PER 1 MG: Performed by: EMERGENCY MEDICINE

## 2019-12-18 RX ORDER — ONDANSETRON 2 MG/ML
4 INJECTION INTRAMUSCULAR; INTRAVENOUS ONCE
Status: COMPLETED | OUTPATIENT
Start: 2019-12-18 | End: 2019-12-18

## 2019-12-18 RX ADMIN — HYDROMORPHONE HYDROCHLORIDE 1 MG: 1 INJECTION, SOLUTION INTRAMUSCULAR; INTRAVENOUS; SUBCUTANEOUS at 21:32

## 2019-12-18 RX ADMIN — ONDANSETRON HYDROCHLORIDE 4 MG: 2 SOLUTION INTRAMUSCULAR; INTRAVENOUS at 21:33

## 2019-12-19 NOTE — ED PROVIDER NOTES
Subjective   She complains of a headache that is been going on for the past 4 days and has been unrelenting.  She says she has had headaches like this in the past and has had 9 brain surgeries.  She has not had one last this longer be this severe a long time but it is not unheard of headache for her.  She has had some nausea vomiting and is sensitive to light.  She is tried her usual medications at home which apparently include Aleve and Vimpat but they have not helped.  They have told her not to take anything more than that.  She came here for percent relief.  She does say her headaches are so severe that she wanted to kill herself to get rid of the headaches.  When I question her closely about that she does not want to harm herself actually.  She says she has 2 kids and does not want to do that but she just says the headaches are that severe.      History provided by:  Patient   used: No    Headache   Pain location:  Frontal  Quality:  Dull  Radiates to:  Does not radiate  Severity currently:  9/10  Severity at highest:  9/10  Onset quality:  Gradual  Duration:  4 days  Timing:  Constant  Progression:  Unchanged  Chronicity:  Recurrent  Similar to prior headaches: yes    Context: not activity, not exposure to bright light, not caffeine, not coughing, not defecating, not eating, not stress, not exposure to cold air, not intercourse, not loud noise and not straining    Relieved by:  Nothing  Worsened by:  Nothing  Ineffective treatments:  None tried  Associated symptoms: no abdominal pain, no back pain, no blurred vision, no congestion, no cough, no diarrhea, no dizziness, no drainage, no ear pain, no eye pain, no facial pain, no fatigue, no fever, no focal weakness, no hearing loss, no loss of balance, no myalgias, no nausea, no near-syncope, no neck pain, no neck stiffness, no numbness, no paresthesias, no photophobia, no seizures, no sinus pressure, no sore throat, no swollen glands, no  syncope, no tingling, no URI, no visual change, no vomiting and no weakness    Risk factors: no anger, no family hx of SAH, does not have insomnia and lifestyle not sedentary        Review of Systems   Constitutional: Negative.  Negative for fatigue and fever.   HENT: Negative for congestion, ear pain, hearing loss, postnasal drip, sinus pressure and sore throat.    Eyes: Negative for blurred vision, photophobia and pain.   Respiratory: Negative.  Negative for cough.    Cardiovascular: Negative.  Negative for syncope and near-syncope.   Gastrointestinal: Negative.  Negative for abdominal pain, diarrhea, nausea and vomiting.   Genitourinary: Negative.    Musculoskeletal: Negative.  Negative for back pain, myalgias, neck pain and neck stiffness.   Skin: Negative.    Neurological: Positive for headaches. Negative for dizziness, focal weakness, seizures, weakness, numbness, paresthesias and loss of balance.   Hematological: Negative.    Psychiatric/Behavioral: Negative.    All other systems reviewed and are negative.      Past Medical History:   Diagnosis Date   • Cerebral aneurysm    • GERD (gastroesophageal reflux disease)    • H/O: hysterectomy    • Hypertension    • Iron deficiency anemia    • Seizure (CMS/HCC)        Allergies   Allergen Reactions   • Infed [Iron Dextran] Anaphylaxis     Throat tightness   • Rafiq Butter Hives     Allergic to rafiq fruit    • Ampicillin Rash   • Lamotrigine Rash   • Latex Rash   • Lortab [Hydrocodone-Acetaminophen] Rash   • Morphine Rash   • Nitrofurantoin Rash   • Nuts Rash   • Poison Ivy Extract Rash       Past Surgical History:   Procedure Laterality Date   • APPENDECTOMY     •  SECTION      x2   • CHOLECYSTECTOMY     • COLONOSCOPY     • CRANIOTOMY     • ENDOMETRIAL ABLATION     • HYSTERECTOMY         Family History   Problem Relation Age of Onset   • Rheum arthritis Mother    • No Known Problems Brother    • No Known Problems Sister    • No Known Problems Daughter    •  No Known Problems Brother    • No Known Problems Brother    • No Known Problems Brother    • No Known Problems Daughter        Social History     Socioeconomic History   • Marital status: Single     Spouse name: Not on file   • Number of children: Not on file   • Years of education: Not on file   • Highest education level: Not on file   Tobacco Use   • Smoking status: Never Smoker   • Smokeless tobacco: Never Used   Substance and Sexual Activity   • Alcohol use: No   • Drug use: No   • Sexual activity: Yes     Partners: Male       Prior to Admission medications    Medication Sig Start Date End Date Taking? Authorizing Provider   azithromycin (ZITHROMAX) 250 MG tablet TAKE 2 TABLETS (500 MG) BY MOUTH ONCE DAILY FOR 1 DAY THEN 1 TABLET (250 MG) BY MOUTH ONCE DAILY FOR 4 DAYS 11/18/19      Lacosamide 150 MG tablet Take 1 tablet by mouth once daily 9/25/18      naproxen sodium (ALEVE) 220 MG tablet Take 440 mg by mouth 2 (Two) Times a Day As Needed for Mild Pain .    Provider, MD Ayaka   SUMAtriptan (IMITREX) 25 MG tablet Take 25 mg by mouth Every 2 (Two) Hours As Needed for Migraine. Take one tablet at onset of headache. May repeat dose one time in 2 hours if headache not relieved.    Emergency, Nurse Epic, RN   tobramycin 0.3 % solution ophthalmic solution Administer 2 drops to both eyes Every 4 (Four) Hours. 10/23/19   [place] APRSHAKILA   topiramate (TOPAMAX) 25 MG tablet Take 1 tablet by mouth twice daily 10/18/19          Medications   HYDROmorphone (DILAUDID) injection solution 1 mg (1 mg Intramuscular Given 12/18/19 2132)   ondansetron (ZOFRAN) injection 4 mg (4 mg Intramuscular Given 12/18/19 2133)       Vitals:    12/18/19 2144   BP: 136/82   Pulse: 78   Resp: 16   Temp: 98.2 °F (36.8 °C)   SpO2: 100%         Objective   Physical Exam   Constitutional: She is oriented to person, place, and time. She appears well-developed and well-nourished.   HENT:   Head: Normocephalic and atraumatic.   Eyes:  Pupils are equal, round, and reactive to light. EOM are normal.   Neck: Normal range of motion. Neck supple.   Musculoskeletal: Normal range of motion.   Neurological: She is alert and oriented to person, place, and time.   Skin: Skin is warm and dry.   Psychiatric: She has a normal mood and affect. Her behavior is normal.   Nursing note and vitals reviewed.      Procedures         Lab Results (last 24 hours)     ** No results found for the last 24 hours. **          No orders to display       ED Course  ED Course as of Dec 18 2155   Wed Dec 18, 2019   2015 Question the patient closely and apparently the statement about suicidal ideation was.  He has a description of the severity of her pain and not multiple intent.  I offered to get her something for her headache so she can get some rest but she will have to call family get her home.  She will be discharged in stable condition.    [TR]      ED Course User Index  [TR] Jasiel Pendleton Jr., MD          MDM  Number of Diagnoses or Management Options  Acute nonintractable headache, unspecified headache type: established and worsening  Risk of Complications, Morbidity, and/or Mortality  Presenting problems: moderate  Diagnostic procedures: moderate  Management options: moderate    Patient Progress  Patient progress: stable      Final diagnoses:   Acute nonintractable headache, unspecified headache type          Jasiel Pendleton Jr., MD  12/18/19 2155

## 2020-03-12 ENCOUNTER — OFFICE VISIT (OUTPATIENT)
Dept: NEUROLOGY | Facility: CLINIC | Age: 40
End: 2020-03-12

## 2020-03-12 VITALS
WEIGHT: 182 LBS | SYSTOLIC BLOOD PRESSURE: 130 MMHG | DIASTOLIC BLOOD PRESSURE: 86 MMHG | BODY MASS INDEX: 32.25 KG/M2 | HEART RATE: 99 BPM | OXYGEN SATURATION: 97 % | HEIGHT: 63 IN

## 2020-03-12 DIAGNOSIS — G43.709 CHRONIC MIGRAINE WITHOUT AURA WITHOUT STATUS MIGRAINOSUS, NOT INTRACTABLE: ICD-10-CM

## 2020-03-12 DIAGNOSIS — M79.18 CERVICAL MYOFASCIAL PAIN SYNDROME: ICD-10-CM

## 2020-03-12 DIAGNOSIS — R51.9 CHRONIC NONINTRACTABLE HEADACHE, UNSPECIFIED HEADACHE TYPE: Primary | ICD-10-CM

## 2020-03-12 DIAGNOSIS — G89.29 CHRONIC NONINTRACTABLE HEADACHE, UNSPECIFIED HEADACHE TYPE: Primary | ICD-10-CM

## 2020-03-12 DIAGNOSIS — M25.511 CHRONIC RIGHT SHOULDER PAIN: ICD-10-CM

## 2020-03-12 DIAGNOSIS — G47.26 SHIFT WORK SLEEP DISORDER: ICD-10-CM

## 2020-03-12 DIAGNOSIS — G40.909 SEIZURE DISORDER (HCC): ICD-10-CM

## 2020-03-12 DIAGNOSIS — G89.29 CHRONIC RIGHT SHOULDER PAIN: ICD-10-CM

## 2020-03-12 PROCEDURE — 99214 OFFICE O/P EST MOD 30 MIN: CPT | Performed by: PHYSICIAN ASSISTANT

## 2020-03-12 NOTE — PROGRESS NOTES
"  Neurology Progress Note      Chief Complaint:    Chronic headache disorder  Seizure disorder  History of right occipital AVM clipping/craniotomy  Impaired sleep  Anxiety/depression    Subjective     Subjective:  This is a 39-year-old right-hand-dominant female routinely cared for by James Cuevas MD, who has a history of remote right occipital craniotomy secondary to AVM clipping.  Patient also has a history of seizure disorder and has historically been non-compliant with antiepileptic medications.  She has been on Vimpat and Trokendi in the past and takes neither of these at present.  She has not been seen in the office since 2/13/2017.  Additionally, the patient has a history of headaches.  These occur nearly daily.    The patient also recently has had difficulty sleeping.  She is only sleeping at most about 4 hours, however, this is usually between 4 PM and 8 PM each day.  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  Over the last several months to a year she has been noticing periods where she \"spaces out.\"  This can happen several times per shift and has occurred while she is in direct patient care.  She has had no tonic-clonic type of seizure activity.    Recently, the headaches have been so severe that it has been impacting her quality of life.  She states that feeling so poorly has \"made her want to do bad things.\"  She recently was placed on Lexapro and she states that this has essentially resolved such feelings.  She did not realize how bad she had been feeling, by her report.      Additionally, the patient uses Ambien CR at in the afternoon approximately for the last month to help her sleep.  It helps sometimes, however, she has some difficulty waking up in time for her evening shift.  She has never been evaluated for any other sleep disorder.    Also, the patient has chronic posterior cervical pain and occipital type headache on the right side more than the left.  Also refers pain " into the left forehead and retro-orbital space.  These headaches occur daily.  She also has been having difficulty with chronic right shoulder pain that also seems to be contributing to this.      Past Medical History:   Diagnosis Date   • Cerebral aneurysm    • GERD (gastroesophageal reflux disease)    • H/O: hysterectomy    • Hypertension    • Iron deficiency anemia    • Seizure (CMS/HCC)      Past Surgical History:   Procedure Laterality Date   • APPENDECTOMY     •  SECTION      x2   • CHOLECYSTECTOMY     • COLONOSCOPY     • CRANIOTOMY     • ENDOMETRIAL ABLATION     • HYSTERECTOMY       Family History   Problem Relation Age of Onset   • Rheum arthritis Mother    • No Known Problems Brother    • No Known Problems Sister    • No Known Problems Daughter    • No Known Problems Brother    • No Known Problems Brother    • No Known Problems Brother    • No Known Problems Daughter      Social History     Tobacco Use   • Smoking status: Never Smoker   • Smokeless tobacco: Never Used   Substance Use Topics   • Alcohol use: No   • Drug use: No       Medications:  Current Outpatient Medications   Medication Sig Dispense Refill   • escitalopram (LEXAPRO) 10 MG tablet Take 1 tablet by mouth once daily 30 tablet 5   • fluticasone (FLONASE) 50 MCG/ACT nasal spray Use 2 sprays into the nostril as directed by provider Daily. 16 g 0   • hydrOXYzine pamoate (VISTARIL) 25 MG capsule Take 1 capsule by mouth daily 30 capsule 0   • rizatriptan MLT (MAXALT-MLT) 10 MG disintegrating tablet Take 1 tablet daily as needed for headache, may repeat 1 dose after 2 hours. Max 2 dosages per day 9 tablet 2   • verapamil ER (VERELAN) 120 MG 24 hr capsule Take 1 capsule by mouth once daily 30 capsule 5   • zolpidem CR (AMBIEN CR) 6.25 MG CR tablet Take 1 tablet by mouth at bedtime as needed 30 tablet 2   • galcanezumab-gnlm (EMGALITY) 120 MG/ML prefilled syringe Inject 2 mL under the skin into the appropriate area as directed 1 (One) Time  for 1 dose. 1.12 mL 0   • Ubrogepant (ubrogepant) 50 MG tablet Take 1 tablet by mouth As Needed (at onset of a migraine, may repeat once in 2 hours.). 4 tablet 0     No current facility-administered medications for this visit.        Allergies:    Infed [iron dextran]; Hydromorphone hcl; Wilfredo butter; Ampicillin; Lamotrigine; Latex; Lortab [hydrocodone-acetaminophen]; Morphine; Nitrofurantoin; Nuts; and Poison ivy extract    Review of Systems:   -A 14 point review of systems is completed and is negative.      Objective      Vital Signs  Heart Rate:  [99] 99  BP: (130)/(86) 130/86    Physical Exam:    General Exam:  Head:  Normocephalic, atraumatic.  HEENT: PERRLA.  Full EOM.  Neck:  No lymphadenopathy, thyromegaly or bruit.  Cardiac:  Regular rate and rhythm.  Normal S1, S2.  No murmur, rub or gallop.  Lungs:  Clear to auscultation bilaterally.  No wheeze, rales or rhonchi.  Abdomen:  Non-tender, Non-distended.  Bowel sounds normoactive.  Extremities: Full peripheral pulses.  No clubbing, cyanosis or edema.  Skin: No ulceration, breakdown or rash.      Neurologic Exam:  CERVICAL SPINE EXAMINATION:  RANGE OF MOTION: The patient is able to flex, extend, rotate, and side bend without pain or difficulty.  Slightly limited side bending bilaterally.  PALPATION: Tightness throughout the posterior cervical musculature and especially in the bilateral levator spinae, upper and mid trapezius muscle on the right than the left.  Exquisite tenderness to palpation over the right occipital nerve bifurcation and over the left supraorbital nerve.  STRENGTH: 5/5 bilateral trapezius, deltoid, triceps, biceps, wrist extensors/flexors, finger opposition.  SENSATION: Light touch and pinprick intact C5-T1 bilaterally.  REFLEXES: DTRs are 2+ bilaterally at biceps, triceps, and brachioradialis.  Poole's and palmomental are negative bilaterally.  SPECIAL TESTS:  Tinel's & Phalen's are negative. Spurling's is negative bilaterally.      "Coordination:  -Finger to nose intact BUEs  -Heel to shin intact BLEs  -No ataxia     Gait  -No signs of ataxia  -ambulates unassisted       Results Review:        Assessment/Plan     Impression:  1.  Migraine headache disorder  2.  Bilateral occipital neuralgia, right greater than left  3.  Chronic cervical myofascial pain  4.  Right shoulder pain  5.  Seizure disorder  6.  History of craniotomy, right occipital AVM clipping  7.  Shift work sleep disorder  8.  Anxiety and depression disorder      Plan:  1.  Update EEG of the brain to evaluate for epileptiform activity.  At this time, I do not believe the \"spacing out\" represents seizure activity, but rather, is likely her having residual effects of the Ambien.  She is taking this into close proximity to her work shift and is likely causing some amnestic effects as well.    2.  Strongly recommend discontinuing Ambien and consider alternate medication such as trazodone to help initiate sleep.  Also, I would recommend that the patient not work nighttime shift as this is not beneficial for her headache, quality of sleep, potential for seizure.  She is staying chronically sleep-deprived and this is also likely to lower seizure threshold.  I am writing a letter to advocate for her working a day or evening shift rather than third shift.    3.  Start Emgality 100 mg subcu monthly for migraine prophylaxis.  Discussed risks, benefits & alternatives with the medication and she voices understanding.  She will call for any concerns or questions in the interim.  I will see her back in 1 month to evaluate initial tolerance and then likely 2 months, thereafter at which time we will likely start to see the overall benefit of the medication and migraine prophylaxis.    4.  Start Ubrelvy 50 mg as directed for aborting acute migraine.  Discussed potential side effects of the medication and its use as well.  She also will call for any concerns or questions in the interim.  She also may " continue to use the Maxalt as needed, however, I have asked her to use this preferentially in terms of aborting an acute migraine.    5.  Update MRI of the brain with and without contrast.  Given that she has had a recent increase in escalation in headache intensity, frequency and duration, and her surgical history, I would like updated imaging of the brain.  I hope that this would be comparable to the previous study from 2018.    6.  Recommend adequate sleep to avoid lowering seizure threshold.  Continue to work with primary care regarding this.  I do believe she has shift work sleep disorder, however, may also consider the possibility of sleep study and ruling out or evaluating for obstructive sleep apnea.    7.  Recommend daily anti-inflammatory diet to help mitigate headache.    8.  Referral to physical therapy to treat chronic cervical and right shoulder pain.  I would like them to address her cervical myofascial pain, obtain myofascial release/massage, e-stim, heat, cervical traction and provide home exercise/stretching program.    9.  At follow-up, consider the possibility of at least right if not bilateral occipital nerve blocks and continuing outpatient physical therapy.  Would likely place her on a course of this to help mitigate occipital neuralgia component of headache and see how this reduces the overall daily headache burden and then the other medications mentioned above will help with her chronic migraine.    The patient voices understanding and agreement with plan of care and will call for any concerns or questions in the interim.  Spent over 25 minutes in direct face-to-face contact with the patient over 35-minute encounter discussing the aforementioned clinical history, previous neurodiagnostics,, discussing the new medications and potential side effects and the very that this as well as her therapy influences her overall health status and headache burden.    Paramjit Nielsen,  DULCE MARIA  03/12/20  09:09

## 2020-03-26 PROCEDURE — 87635 SARS-COV-2 COVID-19 AMP PRB: CPT | Performed by: NURSE PRACTITIONER

## 2020-03-26 PROCEDURE — U0003 INFECTIOUS AGENT DETECTION BY NUCLEIC ACID (DNA OR RNA); SEVERE ACUTE RESPIRATORY SYNDROME CORONAVIRUS 2 (SARS-COV-2) (CORONAVIRUS DISEASE [COVID-19]), AMPLIFIED PROBE TECHNIQUE, MAKING USE OF HIGH THROUGHPUT TECHNOLOGIES AS DESCRIBED BY CMS-2020-01-R: HCPCS | Performed by: NURSE PRACTITIONER

## 2020-04-15 ENCOUNTER — TRANSCRIBE ORDERS (OUTPATIENT)
Dept: ADMINISTRATIVE | Facility: HOSPITAL | Age: 40
End: 2020-04-15

## 2020-04-15 DIAGNOSIS — F33.2 MAJOR DEPRESSIVE DISORDER, RECURRENT SEVERE WITHOUT PSYCHOTIC FEATURES (HCC): Primary | ICD-10-CM

## 2020-04-15 DIAGNOSIS — D50.9 IRON DEFICIENCY ANEMIA, UNSPECIFIED IRON DEFICIENCY ANEMIA TYPE: ICD-10-CM

## 2020-04-15 DIAGNOSIS — Z00.00 ENCOUNTER FOR GENERAL ADULT MEDICAL EXAMINATION W/O ABNORMAL FINDINGS: ICD-10-CM

## 2020-04-30 ENCOUNTER — LAB (OUTPATIENT)
Dept: LAB | Facility: HOSPITAL | Age: 40
End: 2020-04-30

## 2020-04-30 DIAGNOSIS — D50.9 IRON DEFICIENCY ANEMIA, UNSPECIFIED IRON DEFICIENCY ANEMIA TYPE: ICD-10-CM

## 2020-04-30 DIAGNOSIS — F33.2 MAJOR DEPRESSIVE DISORDER, RECURRENT SEVERE WITHOUT PSYCHOTIC FEATURES (HCC): ICD-10-CM

## 2020-04-30 DIAGNOSIS — Z00.00 ENCOUNTER FOR GENERAL ADULT MEDICAL EXAMINATION W/O ABNORMAL FINDINGS: ICD-10-CM

## 2020-04-30 LAB
ALBUMIN SERPL-MCNC: 4.3 G/DL (ref 3.5–5.2)
ALBUMIN/GLOB SERPL: 1.2 G/DL
ALP SERPL-CCNC: 78 U/L (ref 39–117)
ALT SERPL W P-5'-P-CCNC: 13 U/L (ref 1–33)
ANION GAP SERPL CALCULATED.3IONS-SCNC: 10 MMOL/L (ref 5–15)
AST SERPL-CCNC: 15 U/L (ref 1–32)
BILIRUB SERPL-MCNC: 0.2 MG/DL (ref 0.2–1.2)
BUN BLD-MCNC: 12 MG/DL (ref 6–20)
BUN/CREAT SERPL: 16.2 (ref 7–25)
CALCIUM SPEC-SCNC: 9.4 MG/DL (ref 8.6–10.5)
CHLORIDE SERPL-SCNC: 100 MMOL/L (ref 98–107)
CHOLEST SERPL-MCNC: 246 MG/DL (ref 0–200)
CO2 SERPL-SCNC: 27 MMOL/L (ref 22–29)
CREAT BLD-MCNC: 0.74 MG/DL (ref 0.57–1)
DEPRECATED RDW RBC AUTO: 46 FL (ref 37–54)
ERYTHROCYTE [DISTWIDTH] IN BLOOD BY AUTOMATED COUNT: 15 % (ref 12.3–15.4)
GFR SERPL CREATININE-BSD FRML MDRD: 106 ML/MIN/1.73
GLOBULIN UR ELPH-MCNC: 3.5 GM/DL
GLUCOSE BLD-MCNC: 113 MG/DL (ref 65–99)
HCT VFR BLD AUTO: 40.3 % (ref 34–46.6)
HDLC SERPL-MCNC: 65 MG/DL (ref 40–60)
HGB BLD-MCNC: 13.2 G/DL (ref 12–15.9)
LDLC SERPL CALC-MCNC: 133 MG/DL (ref 0–100)
LDLC/HDLC SERPL: 2.04 {RATIO}
MCH RBC QN AUTO: 27.4 PG (ref 26.6–33)
MCHC RBC AUTO-ENTMCNC: 32.8 G/DL (ref 31.5–35.7)
MCV RBC AUTO: 83.8 FL (ref 79–97)
PLATELET # BLD AUTO: 300 10*3/MM3 (ref 140–450)
PMV BLD AUTO: 10 FL (ref 6–12)
POTASSIUM BLD-SCNC: 4.3 MMOL/L (ref 3.5–5.2)
PROT SERPL-MCNC: 7.8 G/DL (ref 6–8.5)
RBC # BLD AUTO: 4.81 10*6/MM3 (ref 3.77–5.28)
SODIUM BLD-SCNC: 137 MMOL/L (ref 136–145)
TRIGL SERPL-MCNC: 241 MG/DL (ref 0–150)
TSH SERPL DL<=0.05 MIU/L-ACNC: 3.55 UIU/ML (ref 0.27–4.2)
VLDLC SERPL-MCNC: 48.2 MG/DL
WBC NRBC COR # BLD: 11.56 10*3/MM3 (ref 3.4–10.8)

## 2020-04-30 PROCEDURE — 36415 COLL VENOUS BLD VENIPUNCTURE: CPT

## 2020-04-30 PROCEDURE — 80053 COMPREHEN METABOLIC PANEL: CPT

## 2020-04-30 PROCEDURE — 85027 COMPLETE CBC AUTOMATED: CPT

## 2020-04-30 PROCEDURE — 80061 LIPID PANEL: CPT

## 2020-04-30 PROCEDURE — 84443 ASSAY THYROID STIM HORMONE: CPT

## 2020-05-04 ENCOUNTER — TRANSCRIBE ORDERS (OUTPATIENT)
Dept: ADMINISTRATIVE | Facility: HOSPITAL | Age: 40
End: 2020-05-04

## 2020-05-04 DIAGNOSIS — E78.5 HYPERLIPIDEMIA, UNSPECIFIED HYPERLIPIDEMIA TYPE: Primary | ICD-10-CM

## 2020-05-04 DIAGNOSIS — R73.9 ELEVATED BLOOD SUGAR: ICD-10-CM

## 2020-05-04 DIAGNOSIS — D72.829 LEUKOCYTOSIS, UNSPECIFIED TYPE: ICD-10-CM

## 2020-05-12 ENCOUNTER — TREATMENT (OUTPATIENT)
Dept: PHYSICAL THERAPY | Facility: CLINIC | Age: 40
End: 2020-05-12

## 2020-05-12 DIAGNOSIS — M54.12 RADICULOPATHY, CERVICAL: ICD-10-CM

## 2020-05-12 DIAGNOSIS — M54.2 PAIN, NECK: Primary | ICD-10-CM

## 2020-05-12 PROCEDURE — 97110 THERAPEUTIC EXERCISES: CPT | Performed by: PHYSICAL THERAPIST

## 2020-05-12 PROCEDURE — 97140 MANUAL THERAPY 1/> REGIONS: CPT | Performed by: PHYSICAL THERAPIST

## 2020-05-12 PROCEDURE — 97161 PT EVAL LOW COMPLEX 20 MIN: CPT | Performed by: PHYSICAL THERAPIST

## 2020-05-12 NOTE — PROGRESS NOTES
"Outpatient Physical Therapy Ortho Initial Evaluation       Patient Name: Mandy Barrera  : 1980  MRN: [id number]  Today's Date: 2020      Visit Date: 2020    Patient Active Problem List   Diagnosis   • Localization-related symptomatic epilepsy and epileptic syndromes with simple partial seizures, not intractable, without status epilepticus (CMS/HCC)   • Cerebral AVM   • S/P craniotomy   • Generalized headaches   • Malabsorption of iron   • Anemia        Past Medical History:   Diagnosis Date   • Cerebral aneurysm    • GERD (gastroesophageal reflux disease)    • H/O: hysterectomy    • Hypertension    • Iron deficiency anemia    • Seizure (CMS/HCC)         Past Surgical History:   Procedure Laterality Date   • APPENDECTOMY     •  SECTION      x2   • CHOLECYSTECTOMY     • COLONOSCOPY     • CRANIOTOMY     • ENDOMETRIAL ABLATION     • HYSTERECTOMY         Visit Dx:     ICD-10-CM ICD-9-CM   1. Pain, neck M54.2 723.1   2. Radiculopathy, cervical M54.12 723.4         Patient History     Row Name 20 0745             History    Chief Complaint  Pain  -KR      Type of Pain  Neck pain;Shoulder pain  -KR      Brief Description of Current Complaint  She reports chronic neck and R shoulder pain (for 9+ months). She went to [facility] for her R shoulder and was told it was more from her neck. She has HAs. She reports with laying down she has N/T in her arms (B). She does have history of brain aneurysm, 9 surgeries and history of seizures, possibility most recent 3 weeks. She reports the seizures are not grand mal and she just \"spaces\" out.   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  TENS, injections, medications  -KR         Pain     Pain Location  Neck;Shoulder  -KR      Pain at Present  7  -KR      Pain at Best  0 when sleeping  -KR      Pain at " Worst  10  -KR      Pain Frequency  Constant/continuous  -KR      What Performance Factors Make the Current Problem(s) WORSE?  turning head, ear to shoulder. reaching out, lifting, pushing/pulling cart at work.   -KR      What Performance Factors Make the Current Problem(s) BETTER?  TENs, lidocaine patch  -KR      Is your sleep disturbed?  Yes  -KR      Is medication used to assist with sleep?  -- not currently  -KR      What position do you sleep in?  Supine  -KR         Fall Risk Assessment    Any falls in the past year:  No  -KR         Services    Prior Rehab/Home Health Experiences  Yes  -KR         Daily Activities    Pt Participated in POC and Goals  Yes  -KR        User Key  (r) = Recorded By, (t) = Taken By, (c) = Cosigned By    Initials Name Provider Type    KR Connie Sen, PT DPT Physical Therapist          PT Ortho     Row Name 05/12/20 0745       Posture/Observations    Alignment Options  Forward head;Cervical lordosis;Thoracic kyphosis;Rounded shoulders  -KR    Forward Head  Mild;Increased  -KR    Cervical Lordosis  Moderate;Decreased hinges C4  -KR    Thoracic Kyphosis  Moderate;Decreased mid-lower; slight increase at CT junction  -KR    Rounded Shoulders  Mild;Increased  -KR       Quarter Clearing    Quarter Clearing  Upper Quarter Clearing  -KR       DTR- Upper Quarter Clearing    Biceps (C5/6)  Bilateral:;2- Normal response  -KR    Triceps (C7)  Bilateral:;2- Normal response  -KR       Sensory Screen for Light Touch- Upper Quarter Clearing    C4 (posterior shoulder)  Bilateral:;Intact  -KR    C5 (lateral upper arm)  Bilateral:;Intact  -KR    C6 (tip of thumb)  Bilateral:;Intact  -KR    C7 (tip of 3rd finger)  Bilateral:;Intact  -KR    C8 (tip of 5th finger)  Bilateral:;Intact  -KR    T1 (medial lower arm)  Bilateral:;Intact  -KR       Myotomal Screen- Upper Quarter Clearing    Shoulder flexion (C5)  Left:;5 (Normal);Right:;4+ (Good +) R painful  -KR    Elbow flexion/wrist extension (C6)   Bilateral:;WNL  -KR    Elbow extension/wrist flexion (C7)  Left:;5 (Normal);Right:;4+ (Good +) R painful  -KR    Finger flexion/ (C8)  Bilateral:;WNL  -KR    Finger abduction (T1)  Bilateral:;WNL  -KR       Cervical/Shoulder ROM Screen    Cervical flexion  -- 29  -KR    Cervical extension  -- 0  -KR    Cervical lateral flexion  -- R 25 L 24  -KR    Cervical rotation  -- R 55 L 40  -KR       Special Tests/Palpation    Special Tests/Palpation  Cervical/Thoracic  -KR       Cervical Palpation    Cervical Palpation- Location?  Suboccipital;Cervical facets;Upper traps;Levator scapula;Middle traps;Rhomboids  -KR    Suboccipital  Bilateral:;Tender;Guarded/taut R>L  -KR    Cervical Facets  Bilateral:;Tender;Guarded/taut;Trigger point R>L;trigger point R C4/5  -KR    Levator Scapula  Bilateral:;Tender;Guarded/taut R>L  -KR    Upper Traps  Bilateral:;Tender;Guarded/taut R>L  -KR    Middle Traps  Bilateral:;Tender;Guarded/taut R>L  -KR    Rhomboids  Bilateral:;Tender;Guarded/taut R>L  -KR       Cervical Accessory Motions    Cervical Accessory Motions Tested?  Yes  -KR    PA glide- C1  Hypomobile;Right pain;Right:  -KR    PA glide- C2  Right:;Hypomobile;Right pain  -KR    PA glide- C7  Center:;Hypomobile  -KR       Thoracic Accessory Motions    Thoracic Accessory Motions Tested?  Yes  -KR    Pa glide- Upper thoracic  Center:;Hypomobile  -KR       Cervical/Thoracic Special Tests    Spurlings (Foraminal Compression)  Left:;Positive;Right:;Negative  -KR    Cervical Compression (Forarminal Compression vs. Facet Pain)  Negative  -KR    Cervical Distraction (Foraminal Compression vs. Facet Pain)  Positive  -KR      User Key  (r) = Recorded By, (t) = Taken By, (c) = Cosigned By    Initials Name Provider Type    Connie Looney PT DPT Physical Therapist                      Therapy Education  Education Details: unweighted cerivcal rotations and lateral flexions  Given: HEP, Symptoms/condition management, Posture/body  mechanics  Program: New  How Provided: Verbal, Demonstration, Written  Provided to: Patient  Level of Understanding: Verbalized, Demonstrated     PT OP Goals     Row Name 05/12/20 0745          PT Short Term Goals    STG Date to Achieve  06/11/20  -KR     STG 1  Pt will report pain no greater than 2-3/10 throughout the day.   -KR     STG 1 Progress  New  -KR        Long Term Goals    LTG Date to Achieve  07/11/20  -KR     LTG 1  Pt will score 14 or less on the NDI.   -KR     LTG 1 Progress  New  -KR     LTG 2  Pt will demonstrate 65 degrees or greater cervical rotation (B).   -KR     LTG 2 Progress  New  -KR     LTG 3  Pt will be independent with HEP to include mobility and postural strengthening.   -KR     LTG 3 Progress  New  -KR        Time Calculation    PT Goal Re-Cert Due Date  06/11/20  -KR       User Key  (r) = Recorded By, (t) = Taken By, (c) = Cosigned By    Initials Name Provider Type    Connie Looney, PT DPT Physical Therapist          PT Assessment/Plan     Row Name 05/12/20 0745          PT Assessment    Functional Limitations  Limitation in home management;Limitations in community activities;Performance in leisure activities;Performance in work activities  -KR     Impairments  Pain;Muscle strength;Joint mobility;Posture  -KR     Assessment Comments  Ms. Charles presents with long history of neck pain, HAs and most recently worsening R shoulder pain. She does have a history of a cerebral aneurysm with craniotomy and seizures. Upon assessment today she has decreased CT segmental mobility, with the greatest being upper cervical. She has surrounding soft tissue restrictions as well. Her cervical lordosis and thoracic kyphosis are decreased and rather flat. She gets a lot of her motion at approximately C4. Skilled PT needed to address these deficits and progress towards independent HEP.   -KR     Please refer to paper survey for additional self-reported information  Yes  -KR     Rehab Potential   Good  -KR     Patient/caregiver participated in establishment of treatment plan and goals  Yes  -KR     Patient would benefit from skilled therapy intervention  Yes  -KR        PT Plan    Predicted Duration of Therapy Intervention (Therapy Eval)  10-12 visits  -KR     Planned CPT's?  PT EVAL MOD COMPLELITY: 75987;PT THER PROC EA 15 MIN: 69544;PT THER ACT EA 15 MIN: 02504;PT MANUAL THERAPY EA 15 MIN: 35396;PT NEUROMUSC RE-EDUCATION EA 15 MIN: 66561;PT GAIT TRAINING EA 15 MIN: 49403;PT TRACTION CERVICAL: 14191  -KR     PT Plan Comments  Initially we will start with working on upper cervical and CT junction mobiltiy, as well as decreasing surrounding soft tissue restrictions. We will then progress with postural strengthening and work towards an independent HEP. We will not use modalities secondary to her history of seizures.   -KR       User Key  (r) = Recorded By, (t) = Taken By, (c) = Cosigned By    Initials Name Provider Type    Connie Looney, PT DPT Physical Therapist            OP Exercises     Row Name 05/12/20 0745             Precautions    Existing Precautions/Restrictions  (S) seizures  -KR         Total Minutes    60149 - PT Therapeutic Exercise Minutes  10  -KR      52150 - PT Manual Therapy Minutes  15  -KR         Exercise 1    Exercise Name 1  unweighted cervical roations and lateral flexions  -KR      Cueing 1  Verbal  -KR      Sets 1  1  -KR      Reps 1  10  -KR        User Key  (r) = Recorded By, (t) = Taken By, (c) = Cosigned By    Initials Name Provider Type    Connie Looney, PT DPT Physical Therapist           Manual Rx (last 36 hours)      Manual Treatments     Row Name 05/12/20 0745             Total Minutes    78999 - PT Manual Therapy Minutes  15  -KR         Manual Rx 1    Manual Rx 1 Location  prone CT junction   -KR      Manual Rx 1 Grade  2/3 no cavitations  -KR         Manual Rx 2    Manual Rx 2 Location  supine STM to suboccipitals and cervical paraspinals  -KR       Manual Rx 2 Grade  min OP  -KR        User Key  (r) = Recorded By, (t) = Taken By, (c) = Cosigned By    Initials Name Provider Type    Connie Looney PT DPT Physical Therapist                      Outcome Measure Options: Neck Disability Index (NDI)  Neck Disability Index  Section 1 - Pain Intensity: The pain is fairly severe at the moment.  Section 2 - Personal Care: It is painful to look after myself, and I am slow and careful.  Section 3 - Lifting: I can lift only very light weights.  Section 4 - Work: I can do most of my usual work, but no more  Section 5 - Headaches: I have severe headaches that come frequently.  Section 6 - Concentration: I have a fair degree of difficulty concentrating.  Section 7 - Sleeping: My sleep is greatly disturbed for up to 3-5 hours.  Section 8 - Driving: I can drive as long as I want with slight neck pain.  Section 9 - Reading: I can read as much as I want with moderate neck pain.  Section 10 - Recreation: I can hardly do recreational activities due to neck pain.  Neck Disability Index Score: 28      Time Calculation:     Total Timed Code Minutes- PT: 25 minute(s)         PT G-Codes  Outcome Measure Options: Neck Disability Index (NDI)  Neck Disability Index Score: 28         Connie Sen, PT DPT  5/12/2020

## 2020-05-14 ENCOUNTER — TREATMENT (OUTPATIENT)
Dept: PHYSICAL THERAPY | Facility: CLINIC | Age: 40
End: 2020-05-14

## 2020-05-14 DIAGNOSIS — M54.2 PAIN, NECK: Primary | ICD-10-CM

## 2020-05-14 DIAGNOSIS — M54.12 RADICULOPATHY, CERVICAL: ICD-10-CM

## 2020-05-14 PROCEDURE — 97140 MANUAL THERAPY 1/> REGIONS: CPT | Performed by: PHYSICAL THERAPIST

## 2020-05-14 PROCEDURE — 97110 THERAPEUTIC EXERCISES: CPT | Performed by: PHYSICAL THERAPIST

## 2020-05-14 NOTE — PROGRESS NOTES
Outpatient Physical Therapy Ortho Treatment Note       Patient Name: Mandy Barrera  : 1980  MRN: [id number]  Today's Date: 2020      Visit Date: 2020    Visit Dx:    ICD-10-CM ICD-9-CM   1. Pain, neck M54.2 723.1   2. Radiculopathy, cervical M54.12 723.4       Patient Active Problem List   Diagnosis   • Localization-related symptomatic epilepsy and epileptic syndromes with simple partial seizures, not intractable, without status epilepticus (CMS/HCC)   • Cerebral AVM   • S/P craniotomy   • Generalized headaches   • Malabsorption of iron   • Anemia        Past Medical History:   Diagnosis Date   • Cerebral aneurysm    • GERD (gastroesophageal reflux disease)    • H/O: hysterectomy    • Hypertension    • Iron deficiency anemia    • Seizure (CMS/HCC)         Past Surgical History:   Procedure Laterality Date   • APPENDECTOMY     •  SECTION      x2   • CHOLECYSTECTOMY     • COLONOSCOPY     • CRANIOTOMY     • ENDOMETRIAL ABLATION     • HYSTERECTOMY                         PT Assessment/Plan     Row Name 20 0700          PT Assessment    Assessment Comments  No goals have been met at this time; however, today was the first treatment session following the initial evaluation. He B pecs and upper traps are very tight and she has more soft tissue restrictions in her R upper trap in comaprison to the L.  -EC        PT Plan    PT Plan Comments  Continue mobilizations and STM  -EC       User Key  (r) = Recorded By, (t) = Taken By, (c) = Cosigned By    Initials Name Provider Type    EC Sharan Mchugh, PTA Physical Therapy Assistant            OP Exercises     Row Name 20 0700             Precautions    Existing Precautions/Restrictions  (S) seizures  -EC         Subjective Comments    Subjective Comments  She denies HA pain right now but reports after the initial evaluation she had a HA thatlasted about 2 days.  -EC         Subjective Pain    Able to rate subjective pain?  yes  -EC       Pre-Treatment Pain Level  0  -EC      Post-Treatment Pain Level  0  -EC         Total Minutes    10345 - PT Therapeutic Exercise Minutes  19  -EC      97148 - PT Manual Therapy Minutes  25  -EC         Exercise 1    Exercise Name 1  HL chin tucks with towel roll  -EC      Reps 1  12  -EC         Exercise 2    Exercise Name 2  B UT and pec stretches  -EC        User Key  (r) = Recorded By, (t) = Taken By, (c) = Cosigned By    Initials Name Provider Type    Sharan Coffey PTA Physical Therapy Assistant                      Manual Rx (last 36 hours)      Manual Treatments     Row Name 05/14/20 0700             Total Minutes    04742 - PT Manual Therapy Minutes  25  -EC         Manual Rx 1    Manual Rx 1 Location  B UT,LS, and cervical paraspinals  -EC      Manual Rx 1 Type  STM in sitting with B UE unweighted  -EC      Manual Rx 1 Duration  15  -EC         Manual Rx 2    Manual Rx 2 Location  cervical   -EC      Manual Rx 2 Type  suboccipital releases, manual traction, P/A mobilizations  -EC      Manual Rx 2 Grade  2 and 3  -EC      Manual Rx 2 Duration  10  -EC        User Key  (r) = Recorded By, (t) = Taken By, (c) = Cosigned By    Initials Name Provider Type    Sharan Coffey PTA Physical Therapy Assistant          PT OP Goals     Row Name 05/14/20 0700          PT Short Term Goals    STG Date to Achieve  06/11/20  -EC     STG 1  Pt will report pain no greater than 2-3/10 throughout the day.   -EC     STG 1 Progress  Ongoing  -EC        Long Term Goals    LTG Date to Achieve  07/11/20  -EC     LTG 1  Pt will score 14 or less on the NDI.   -EC     LTG 1 Progress  Ongoing  -EC     LTG 2  Pt will demonstrate 65 degrees or greater cervical rotation (B).   -EC     LTG 2 Progress  Ongoing  -EC     LTG 3  Pt will be independent with HEP to include mobility and postural strengthening.   -EC     LTG 3 Progress  Ongoing  -EC        Time Calculation    PT Goal Re-Cert Due Date  06/11/20  -EC       User Key   (r) = Recorded By, (t) = Taken By, (c) = Cosigned By    Initials Name Provider Type    EC Sharan Mchugh, JUAN Physical Therapy Assistant          Therapy Education  Education Details: self cervical mobilizations with towel roll  How Provided: Verbal, Demonstration  Provided to: Patient  Level of Understanding: Verbalized, Demonstrated              Time Calculation:                    Sharan Mchugh PTA  5/14/2020

## 2020-05-19 ENCOUNTER — TREATMENT (OUTPATIENT)
Dept: PHYSICAL THERAPY | Facility: CLINIC | Age: 40
End: 2020-05-19

## 2020-05-19 DIAGNOSIS — M54.2 PAIN, NECK: Primary | ICD-10-CM

## 2020-05-19 DIAGNOSIS — M54.12 RADICULOPATHY, CERVICAL: ICD-10-CM

## 2020-05-19 PROCEDURE — 97110 THERAPEUTIC EXERCISES: CPT | Performed by: PHYSICAL THERAPIST

## 2020-05-19 PROCEDURE — 97140 MANUAL THERAPY 1/> REGIONS: CPT | Performed by: PHYSICAL THERAPIST

## 2020-05-21 ENCOUNTER — TREATMENT (OUTPATIENT)
Dept: PHYSICAL THERAPY | Facility: CLINIC | Age: 40
End: 2020-05-21

## 2020-05-21 DIAGNOSIS — M54.12 RADICULOPATHY, CERVICAL: ICD-10-CM

## 2020-05-21 DIAGNOSIS — M54.2 PAIN, NECK: Primary | ICD-10-CM

## 2020-05-21 PROCEDURE — 97110 THERAPEUTIC EXERCISES: CPT | Performed by: PHYSICAL THERAPIST

## 2020-05-21 PROCEDURE — 97140 MANUAL THERAPY 1/> REGIONS: CPT | Performed by: PHYSICAL THERAPIST

## 2020-05-21 NOTE — PROGRESS NOTES
Outpatient Physical Therapy Ortho Treatment Note       Patient Name: Mandy Barrera  : 1980  MRN: [id number]  Today's Date: 2020      Visit Date: 2020    Visit Dx:    ICD-10-CM ICD-9-CM   1. Pain, neck M54.2 723.1   2. Radiculopathy, cervical M54.12 723.4       Patient Active Problem List   Diagnosis   • Localization-related symptomatic epilepsy and epileptic syndromes with simple partial seizures, not intractable, without status epilepticus (CMS/HCC)   • Cerebral AVM   • S/P craniotomy   • Generalized headaches   • Malabsorption of iron   • Anemia        Past Medical History:   Diagnosis Date   • Cerebral aneurysm    • GERD (gastroesophageal reflux disease)    • H/O: hysterectomy    • Hypertension    • Iron deficiency anemia    • Seizure (CMS/HCC)         Past Surgical History:   Procedure Laterality Date   • APPENDECTOMY     •  SECTION      x2   • CHOLECYSTECTOMY     • COLONOSCOPY     • CRANIOTOMY     • ENDOMETRIAL ABLATION     • HYSTERECTOMY                         PT Assessment/Plan     Row Name 20 0900          PT Assessment    Assessment Comments  She surprised me with her ability to perform some scapular stabilization activities against gravity; however, I advised her there was a potentail to have some DOMS and to self monitor.  -EC        PT Plan    PT Plan Comments  Consider adding serratus wall presses to her HEP  -EC       User Key  (r) = Recorded By, (t) = Taken By, (c) = Cosigned By    Initials Name Provider Type    Sharan Coffey PTA Physical Therapy Assistant            OP Exercises     Row Name 20 0800 20 0700          Precautions    Existing Precautions/Restrictions  (S) seizures  -EC  --        Subjective Comments    Subjective Comments  She reports no HA pain since the last session  -EC  --        Subjective Pain    Able to rate subjective pain?  yes  -EC  --     Pre-Treatment Pain Level  0  -EC  --     Post-Treatment Pain Level  0  -EC  --   "      Total Minutes    95381 - PT Therapeutic Exercise Minutes  17  -EC  --     56285 - PT Manual Therapy Minutes  --  23  -EC        Exercise 1    Exercise Name 1  prone \"I's\"  -EC  --     Cueing 1  Verbal;Tactile  -EC  --     Reps 1  15  -EC  --        Exercise 2    Exercise Name 2  modified prone \"W's\"  -EC  --     Cueing 2  Verbal;Tactile  -EC  --     Reps 2  10  -EC  --        Exercise 3    Exercise Name 3  prone \"T's\"   -EC  --     Reps 3  12  -EC  --        Exercise 4    Exercise Name 4  prone \"Y's\" with #1 wand  -EC  --     Reps 4  10  -EC  --        Exercise 5    Exercise Name 5  progressive thoracic stretch on 1/2 foam roller   -EC  --     Time 5  5 min  -EC  --        Exercise 6    Exercise Name 6  B unilateral supine serratus punches with #2  -EC  --     Reps 6  12  -EC  --       User Key  (r) = Recorded By, (t) = Taken By, (c) = Cosigned By    Initials Name Provider Type    EC Sharan Mchugh PTA Physical Therapy Assistant                      Manual Rx (last 36 hours)      Manual Treatments     Row Name 05/21/20 0700             Total Minutes    64835 - PT Manual Therapy Minutes  23  -EC         Manual Rx 1    Manual Rx 1 Location  B UT,LS,cervical paraspinals and suboccipitals  -EC      Manual Rx 1 Type  STM in prone  -EC      Manual Rx 1 Duration  10  -EC         Manual Rx 2    Manual Rx 2 Location  thoracic  -EC      Manual Rx 2 Type  prone extension mobilizations  -EC      Manual Rx 2 Grade  2 and 3  -EC      Manual Rx 2 Duration  13  -EC        User Key  (r) = Recorded By, (t) = Taken By, (c) = Cosigned By    Initials Name Provider Type    EC Sharan Mchugh PTA Physical Therapy Assistant          PT OP Goals     Row Name 05/21/20 0800          PT Short Term Goals    STG Date to Achieve  06/11/20  -EC     STG 1  Pt will report pain no greater than 2-3/10 throughout the day.   -EC     STG 1 Progress  Ongoing  -EC        Long Term Goals    LTG Date to Achieve  07/11/20  -EC     LTG 1  Pt will " score 14 or less on the NDI.   -EC     LTG 1 Progress  Ongoing  -EC     LTG 2  Pt will demonstrate 65 degrees or greater cervical rotation (B).   -EC     LTG 2 Progress  Ongoing  -EC     LTG 2 Progress Comments  L 52, R 60 degrees today  -EC     LTG 3  Pt will be independent with HEP to include mobility and postural strengthening.   -EC     LTG 3 Progress  Ongoing  -EC        Time Calculation    PT Goal Re-Cert Due Date  06/11/20  -EC       User Key  (r) = Recorded By, (t) = Taken By, (c) = Cosigned By    Initials Name Provider Type    Sharan Coffey PTA Physical Therapy Assistant          Therapy Education  Given: Symptoms/condition management  How Provided: Demonstration  Provided to: Patient  Level of Understanding: Verbalized              Time Calculation:                    Sharan Mchugh PTA  5/21/2020

## 2020-05-26 ENCOUNTER — TREATMENT (OUTPATIENT)
Dept: PHYSICAL THERAPY | Facility: CLINIC | Age: 40
End: 2020-05-26

## 2020-05-26 DIAGNOSIS — M54.12 RADICULOPATHY, CERVICAL: ICD-10-CM

## 2020-05-26 DIAGNOSIS — M54.2 PAIN, NECK: Primary | ICD-10-CM

## 2020-05-26 PROCEDURE — 97140 MANUAL THERAPY 1/> REGIONS: CPT | Performed by: PHYSICAL THERAPIST

## 2020-05-26 PROCEDURE — 97110 THERAPEUTIC EXERCISES: CPT | Performed by: PHYSICAL THERAPIST

## 2020-05-26 NOTE — PROGRESS NOTES
Outpatient Physical Therapy Ortho Treatment Note       Patient Name: Mandy Barrera  : 1980  MRN: [id number]  Today's Date: 2020      Visit Date: 2020    Visit Dx:    ICD-10-CM ICD-9-CM   1. Pain, neck M54.2 723.1   2. Radiculopathy, cervical M54.12 723.4       Patient Active Problem List   Diagnosis   • Localization-related symptomatic epilepsy and epileptic syndromes with simple partial seizures, not intractable, without status epilepticus (CMS/HCC)   • Cerebral AVM   • S/P craniotomy   • Generalized headaches   • Malabsorption of iron   • Anemia        Past Medical History:   Diagnosis Date   • Cerebral aneurysm    • GERD (gastroesophageal reflux disease)    • H/O: hysterectomy    • Hypertension    • Iron deficiency anemia    • Seizure (CMS/HCC)         Past Surgical History:   Procedure Laterality Date   • APPENDECTOMY     •  SECTION      x2   • CHOLECYSTECTOMY     • COLONOSCOPY     • CRANIOTOMY     • ENDOMETRIAL ABLATION     • HYSTERECTOMY                         PT Assessment/Plan     Row Name 20 0800          PT Assessment    Assessment Comments  She seems to be doing well and has been asymptomatic for about a week although she does report some occasional soreness after performing therex.  -EC        PT Plan    PT Plan Comments  Review HEP and assess her cervical AROM  -EC       User Key  (r) = Recorded By, (t) = Taken By, (c) = Cosigned By    Initials Name Provider Type    Sharan Coffey PTA Physical Therapy Assistant            OP Exercises     Row Name 20 0700             Precautions    Existing Precautions/Restrictions  (S) seizures  -EC         Subjective Comments    Subjective Comments  No new issues to report, denies any pain  -EC         Subjective Pain    Able to rate subjective pain?  yes  -EC      Pre-Treatment Pain Level  0  -EC         Total Minutes    24439 - PT Therapeutic Exercise Minutes  18  -EC      60641 - PT Manual Therapy Minutes  27   -EC         Exercise 1    Exercise Name 1  progressive thoracic stretch on 1/2 foam roller  -EC      Time 1  5 min  -EC         Exercise 2    Exercise Name 2  HL marching on 1/2 foam roller  -EC      Cueing 2  Verbal;Tactile  -EC         Exercise 3    Exercise Name 3  serratus wall press  -EC      Cueing 3  Verbal;Demo;Tactile  -EC      Reps 3  15  -EC        User Key  (r) = Recorded By, (t) = Taken By, (c) = Cosigned By    Initials Name Provider Type    Sharan Coffey PTA Physical Therapy Assistant                      Manual Rx (last 36 hours)      Manual Treatments     Row Name 05/26/20 0700             Total Minutes    78129 - PT Manual Therapy Minutes  27  -EC         Manual Rx 1    Manual Rx 1 Location  B UT,LS, and cervical paraspinals  -EC      Manual Rx 1 Type  STM in sitting with B UE unweighted  -EC      Manual Rx 1 Duration  15  -EC         Manual Rx 2    Manual Rx 2 Location  upper thoracic  -EC      Manual Rx 2 Type  prone extension mobilizations  -EC      Manual Rx 2 Grade  2 and 3  -EC      Manual Rx 2 Duration  12  -EC        User Key  (r) = Recorded By, (t) = Taken By, (c) = Cosigned By    Initials Name Provider Type    Sharan Coffey PTA Physical Therapy Assistant          PT OP Goals     Row Name 05/26/20 0800          PT Short Term Goals    STG Date to Achieve  06/11/20  -EC     STG 1  Pt will report pain no greater than 2-3/10 throughout the day.   -EC     STG 1 Progress  Ongoing  -EC     STG 1 Progress Comments  none reported last two sessions  -EC        Long Term Goals    LTG Date to Achieve  07/11/20  -EC     LTG 1  Pt will score 14 or less on the NDI.   -EC     LTG 1 Progress  Ongoing  -EC     LTG 2  Pt will demonstrate 65 degrees or greater cervical rotation (B).   -EC     LTG 2 Progress  Ongoing  -EC     LTG 3  Pt will be independent with HEP to include mobility and postural strengthening.   -EC     LTG 3 Progress  Ongoing  -EC        Time Calculation    PT Goal Re-Cert Due  Date  06/11/20  -       User Key  (r) = Recorded By, (t) = Taken By, (c) = Cosigned By    Initials Name Provider Type    Sharan Coffey PTA Physical Therapy Assistant          Therapy Education  Education Details: serratus wall presses x 15 daily  Given: Symptoms/condition management, Pain management, Posture/body mechanics, HEP  Program: New  How Provided: Demonstration, Verbal  Provided to: Patient  Level of Understanding: Verbalized              Time Calculation:                    Sharan Mchugh PTA  5/26/2020

## 2020-05-28 ENCOUNTER — TREATMENT (OUTPATIENT)
Dept: PHYSICAL THERAPY | Facility: CLINIC | Age: 40
End: 2020-05-28

## 2020-05-28 DIAGNOSIS — M54.2 PAIN, NECK: Primary | ICD-10-CM

## 2020-05-28 DIAGNOSIS — M54.12 RADICULOPATHY, CERVICAL: ICD-10-CM

## 2020-05-28 PROCEDURE — 97110 THERAPEUTIC EXERCISES: CPT | Performed by: PHYSICAL THERAPIST

## 2020-05-28 NOTE — PROGRESS NOTES
Outpatient Physical Therapy Ortho Treatment Note       Patient Name: Mandy Barrera  : 1980  MRN: [id number]  Today's Date: 2020      Visit Date: 2020    Visit Dx:    ICD-10-CM ICD-9-CM   1. Pain, neck M54.2 723.1   2. Radiculopathy, cervical M54.12 723.4       Patient Active Problem List   Diagnosis   • Localization-related symptomatic epilepsy and epileptic syndromes with simple partial seizures, not intractable, without status epilepticus (CMS/HCC)   • Cerebral AVM   • S/P craniotomy   • Generalized headaches   • Malabsorption of iron   • Anemia        Past Medical History:   Diagnosis Date   • Cerebral aneurysm    • GERD (gastroesophageal reflux disease)    • H/O: hysterectomy    • Hypertension    • Iron deficiency anemia    • Seizure (CMS/HCC)         Past Surgical History:   Procedure Laterality Date   • APPENDECTOMY     •  SECTION      x2   • CHOLECYSTECTOMY     • COLONOSCOPY     • CRANIOTOMY     • ENDOMETRIAL ABLATION     • HYSTERECTOMY                         PT Assessment/Plan     Row Name 20 1000          PT Assessment    Assessment Comments  Her symptoms are much improved and her cervical AROM has increased as well. She arrived ten minutes late today thus, truncating her treatment session, but she may be approaching D/C or reduced POC frequency status.  -EC        PT Plan    PT Plan Comments  Obtain an updated NDI, consider adding face pulls to her regimen.  -EC       User Key  (r) = Recorded By, (t) = Taken By, (c) = Cosigned By    Initials Name Provider Type    EC Sharan Mchugh PTA Physical Therapy Assistant            OP Exercises     Row Name 20 0900             Precautions    Existing Precautions/Restrictions  (S) seizures  -EC         Subjective Comments    Subjective Comments  She reports a little HA pain yesterday but didn't have to take anything for it, she has pain at the base of her neck when she looks down  -EC         Subjective Pain    Able  to rate subjective pain?  yes  -EC      Pre-Treatment Pain Level  0  -EC         Total Minutes    38109 - PT Therapeutic Exercise Minutes  25  -EC         Exercise 1    Exercise Name 1  unweighted cervical roation, flexion/extension  -EC      Cueing 1  Verbal  -EC      Time 1  2 min each  -EC         Exercise 2    Exercise Name 2  progressive thoracic stretch on 1/2 foam roller  -EC      Time 2  5 min  -EC         Exercise 3    Exercise Name 3  serratus wall push  -EC      Reps 3  15  -EC         Exercise 4    Exercise Name 4  HL chin tuck with cervical flexion  -EC        User Key  (r) = Recorded By, (t) = Taken By, (c) = Cosigned By    Initials Name Provider Type    EC Sharan Mchugh PTA Physical Therapy Assistant                      Manual Rx (last 36 hours)      Manual Treatments     Row Name 05/28/20 0900             Manual Rx 1    Manual Rx 1 Location  B UT,LS, and cervical paraspinals  -EC      Manual Rx 1 Type  STM in sitting with B UE unweighted  -EC      Manual Rx 1 Duration  10  -EC        User Key  (r) = Recorded By, (t) = Taken By, (c) = Cosigned By    Initials Name Provider Type    EC Sharan Mchugh PTA Physical Therapy Assistant          PT OP Goals     Row Name 05/28/20 0900          PT Short Term Goals    STG Date to Achieve  06/11/20  -EC     STG 1  Pt will report pain no greater than 2-3/10 throughout the day.   -EC     STG 1 Progress  Ongoing  -EC        Long Term Goals    LTG Date to Achieve  07/11/20  -EC     LTG 1  Pt will score 14 or less on the NDI.   -EC     LTG 1 Progress  Ongoing  -EC     LTG 2  Pt will demonstrate 65 degrees or greater cervical rotation (B).   -EC     LTG 2 Progress  Ongoing  -EC     LTG 2 Progress Comments  L rotation 62 degrees R 67 degrees , flexion 52 degrees  -EC     LTG 3  Pt will be independent with HEP to include mobility and postural strengthening.   -EC     LTG 3 Progress  Ongoing  -EC        Time Calculation    PT Goal Re-Cert Due Date  06/11/20  -EC        User Key  (r) = Recorded By, (t) = Taken By, (c) = Cosigned By    Initials Name Provider Type    Sharan Coffey PTA Physical Therapy Assistant          Therapy Education  Given: Symptoms/condition management, Pain management, Posture/body mechanics, HEP  Program: Reinforced  How Provided: Verbal, Demonstration  Provided to: Patient              Time Calculation:                    Sharan Mchugh PTA  5/28/2020

## 2020-06-02 ENCOUNTER — TREATMENT (OUTPATIENT)
Dept: PHYSICAL THERAPY | Facility: CLINIC | Age: 40
End: 2020-06-02

## 2020-06-02 DIAGNOSIS — M54.2 PAIN, NECK: Primary | ICD-10-CM

## 2020-06-02 DIAGNOSIS — M54.12 RADICULOPATHY, CERVICAL: ICD-10-CM

## 2020-06-02 PROCEDURE — 97110 THERAPEUTIC EXERCISES: CPT | Performed by: PHYSICAL THERAPIST

## 2020-06-02 PROCEDURE — 97140 MANUAL THERAPY 1/> REGIONS: CPT | Performed by: PHYSICAL THERAPIST

## 2020-06-02 NOTE — PROGRESS NOTES
Outpatient Physical Therapy Ortho Treatment Note       Patient Name: Mandy Barrera  : 1980  MRN: [id number]  Today's Date: 2020      Visit Date: 2020    Visit Dx:    ICD-10-CM ICD-9-CM   1. Pain, neck M54.2 723.1   2. Radiculopathy, cervical M54.12 723.4       Patient Active Problem List   Diagnosis   • Localization-related symptomatic epilepsy and epileptic syndromes with simple partial seizures, not intractable, without status epilepticus (CMS/HCC)   • Cerebral AVM   • S/P craniotomy   • Generalized headaches   • Malabsorption of iron   • Anemia        Past Medical History:   Diagnosis Date   • Cerebral aneurysm    • GERD (gastroesophageal reflux disease)    • H/O: hysterectomy    • Hypertension    • Iron deficiency anemia    • Seizure (CMS/HCC)         Past Surgical History:   Procedure Laterality Date   • APPENDECTOMY     •  SECTION      x2   • CHOLECYSTECTOMY     • COLONOSCOPY     • CRANIOTOMY     • ENDOMETRIAL ABLATION     • HYSTERECTOMY                         PT Assessment/Plan     Row Name 20 0900          PT Assessment    Assessment Comments  Her overall NDI score has improved; however, some components of the NDI have improved while some others have had a change for the worse in comparison to initially.  -EC        PT Plan    PT Plan Comments  Work on core and postural control with activities seated on SB.  -EC       User Key  (r) = Recorded By, (t) = Taken By, (c) = Cosigned By    Initials Name Provider Type    Sharan Coffey PTA Physical Therapy Assistant            OP Exercises     Row Name 20 0700             Precautions    Existing Precautions/Restrictions  (S) seizures  -EC         Subjective Comments    Subjective Comments  She reports having more neck pain today but attributes it to sleeping wrong  -EC         Subjective Pain    Able to rate subjective pain?  yes  -EC      Pre-Treatment Pain Level  7  -EC      Post-Treatment Pain Level  3  -EC          Total Minutes    08129 - PT Therapeutic Exercise Minutes  20  -EC      05760 - PT Manual Therapy Minutes  23  -EC         Exercise 1    Exercise Name 1  obtained an updated NDI (see outcome measures section)  -EC         Exercise 2    Exercise Name 2  progressive thoracic stretch on 1/2 foam roller  -EC      Time 2  5 min  -EC         Exercise 3    Exercise Name 3  face pulls with yellow T band   -EC      Cueing 3  Demo;Verbal;Tactile  -EC      Reps 3  12  -EC        User Key  (r) = Recorded By, (t) = Taken By, (c) = Cosigned By    Initials Name Provider Type    Sharan Coffey PTA Physical Therapy Assistant                      Manual Rx (last 36 hours)      Manual Treatments     Row Name 06/02/20 0700             Total Minutes    31531 - PT Manual Therapy Minutes  23  -EC         Manual Rx 1    Manual Rx 1 Location  B UT,LS, and cervical paraspinals  -EC      Manual Rx 1 Type  STM in sitting with B UE unweighted  -EC      Manual Rx 1 Duration  11  -EC         Manual Rx 2    Manual Rx 2 Location  cervical  -EC      Manual Rx 2 Type  suboccipital releases, manual traction, traction with B lateral bends, and side glides  -EC      Manual Rx 2 Grade  2 and 3  -EC      Manual Rx 2 Duration  12  -EC        User Key  (r) = Recorded By, (t) = Taken By, (c) = Cosigned By    Initials Name Provider Type    Sharan Coffey PTA Physical Therapy Assistant          PT OP Goals     Row Name 06/02/20 0700          PT Short Term Goals    STG Date to Achieve  06/11/20  -EC     STG 1  Pt will report pain no greater than 2-3/10 throughout the day.   -EC     STG 1 Progress  Ongoing  -EC        Long Term Goals    LTG Date to Achieve  07/11/20  -EC     LTG 1  Pt will score 14 or less on the NDI.   -EC     LTG 1 Progress  Ongoing  -EC     LTG 1 Progress Comments  24 today previously 28  -EC     LTG 2  Pt will demonstrate 65 degrees or greater cervical rotation (B).   -EC     LTG 2 Progress  Ongoing  -EC     LTG 3  Pt will be  independent with HEP to include mobility and postural strengthening.   -EC     LTG 3 Progress  Ongoing  -EC       User Key  (r) = Recorded By, (t) = Taken By, (c) = Cosigned By    Initials Name Provider Type    EC Sharan Mchugh PTA Physical Therapy Assistant          Therapy Education  Given: Symptoms/condition management  How Provided: Verbal  Provided to: Patient    Outcome Measure Options: Neck Disability Index (NDI)  Neck Disability Index  Section 1 - Pain Intensity: The pain is fairly severe at the moment.  Section 2 - Personal Care: I can look after myself normally, but it causes extra pain.  Section 3 - Lifting: I can lift heavy weights, but it gives me extra pain.  Section 4 - Work: I can do as much work as I want.  Section 5 - Headaches: I have severe headaches that come frequently.  Section 6 - Concentration: I can concentrate fully with slight difficulty.  Section 7 - Sleeping: My sleep is completely disturbed for up to 5-7 hours.  Section 8 - Driving: I can't drive as long as I want because of moderate neck pain.  Section 9 - Reading: I can't read as much as I want because of severe neck pain.  Section 10 - Recreation: I have some neck pain with a few recreational activities.  Neck Disability Index Score: 24      Time Calculation:          PT G-Codes  Outcome Measure Options: Neck Disability Index (NDI)  Neck Disability Index Score: 24         Sharan Mchugh PTA  6/2/2020

## 2020-06-09 ENCOUNTER — TREATMENT (OUTPATIENT)
Dept: PHYSICAL THERAPY | Facility: CLINIC | Age: 40
End: 2020-06-09

## 2020-06-09 DIAGNOSIS — M54.2 PAIN, NECK: Primary | ICD-10-CM

## 2020-06-09 DIAGNOSIS — M54.12 RADICULOPATHY, CERVICAL: ICD-10-CM

## 2020-06-09 PROCEDURE — 97110 THERAPEUTIC EXERCISES: CPT | Performed by: PHYSICAL THERAPIST

## 2020-06-09 PROCEDURE — 97140 MANUAL THERAPY 1/> REGIONS: CPT | Performed by: PHYSICAL THERAPIST

## 2020-06-09 NOTE — PROGRESS NOTES
Outpatient Physical Therapy Ortho Progress Note       Patient Name: Mandy Barrera  : 1980  MRN: [id number]  Today's Date: 2020      Visit Date: 2020    Visit Dx:    ICD-10-CM ICD-9-CM   1. Pain, neck M54.2 723.1   2. Radiculopathy, cervical M54.12 723.4       Patient Active Problem List   Diagnosis   • Localization-related symptomatic epilepsy and epileptic syndromes with simple partial seizures, not intractable, without status epilepticus (CMS/HCC)   • Cerebral AVM   • S/P craniotomy   • Generalized headaches   • Malabsorption of iron   • Anemia        Past Medical History:   Diagnosis Date   • Cerebral aneurysm    • GERD (gastroesophageal reflux disease)    • H/O: hysterectomy    • Hypertension    • Iron deficiency anemia    • Seizure (CMS/HCC)         Past Surgical History:   Procedure Laterality Date   • APPENDECTOMY     •  SECTION      x2   • CHOLECYSTECTOMY     • COLONOSCOPY     • CRANIOTOMY     • ENDOMETRIAL ABLATION     • HYSTERECTOMY                         PT Assessment/Plan     Row Name 20 0900          PT Assessment    Functional Limitations  Limitation in home management;Limitations in community activities;Performance in leisure activities;Performance in work activities  -WJ     Impairments  Pain;Muscle strength;Joint mobility;Posture  -WJ     Assessment Comments  Her NDI has improved again since the last session and she continues to progress with her POC goals.She has partially met one of her STG's while continuing to progress with the remaining goals.  -EC     Rehab Potential  Good  -WJ     Patient/caregiver participated in establishment of treatment plan and goals  Yes  -WJ     Patient would benefit from skilled therapy intervention  Yes  -WJ        PT Plan    PT Frequency  2x/week  -WJ     Predicted Duration of Therapy Intervention (Therapy Eval)  4 weeks  -WJ     Planned CPT's?  PT RE-EVAL: 80780;PT THER PROC EA 15 MIN: 10488;PT THER ACT EA 15 MIN: 93494;PT  MANUAL THERAPY EA 15 MIN: 20312;PT NEUROMUSC RE-EDUCATION EA 15 MIN: 03494;PT GAIT TRAINING EA 15 MIN: 68218;PT TRACTION CERVICAL: 05761;PT SELF CARE/HOME MGMT/TRAIN EA 15: 73520  -WSARIAH     PT Plan Comments  Assess her response to the test strip and tape her for B UT inhabition and B scapular retraction if appropriate. Review and bolster HEP.   -EC       User Key  (r) = Recorded By, (t) = Taken By, (c) = Cosigned By    Initials Name Provider Type    EC Sharan Mchugh, PTA Physical Therapy Assistant    WJ Callum Boyer, PT DPT Physical Therapist            OP Exercises     Row Name 06/09/20 0700             Precautions    Existing Precautions/Restrictions  (S) seizures  -EC         Subjective Comments    Subjective Comments  She denies HA pain but reports stiffness and soreness in the R side of her neck and around her shoulders  -EC         Subjective Pain    Able to rate subjective pain?  yes  -EC      Pre-Treatment Pain Level  0  -EC      Post-Treatment Pain Level  0  -EC         Total Minutes    65842 - PT Therapeutic Exercise Minutes  35  -EC      79791 - PT Manual Therapy Minutes  15  -EC         Exercise 1    Exercise Name 1  reviewed goals for progress note (see goals section)  -EC         Exercise 2    Exercise Name 2  obtained an updated NDI (see outcome measures section)  -EC         Exercise 3    Exercise Name 3  progressive thoracic stretch on 1/2 foam roller  -EC      Time 3  5 min  -EC         Exercise 4    Exercise Name 4  Tony Chu @ [employer]#2  -EC      Reps 4  15  -EC         Exercise 5    Exercise Name 5  seated marching on 65 cm SB  -EC      Reps 5  15  -EC         Exercise 6    Exercise Name 6  UE phasic standing against 1/2 foam roller against wall with red T band  -EC      Reps 6  15  -EC         Exercise 7    Exercise Name 7  applied Sure prep and KT test strip to dorsum of R hand  -EC        User Key  (r) = Recorded By, (t) = Taken By, (c) = Cosigned By    Initials Name Provider Type     Sharan Coffey PTA Physical Therapy Assistant                      Manual Rx (last 36 hours)      Manual Treatments     Row Name 06/09/20 0700             Total Minutes    14885 - PT Manual Therapy Minutes  15  -EC         Manual Rx 1    Manual Rx 1 Location  B UT,LS, and cervical paraspinals  -EC      Manual Rx 1 Type  STM in sitting with B UE unweighted  -EC      Manual Rx 1 Duration  15  -EC        User Key  (r) = Recorded By, (t) = Taken By, (c) = Cosigned By    Initials Name Provider Type    Sharan Coffey PTA Physical Therapy Assistant          PT OP Goals     Row Name 06/09/20 0700          PT Short Term Goals    STG Date to Achieve  06/11/20  -EC     STG 1  Pt will report pain no greater than 2-3/10 throughout the day.   -EC     STG 1 Progress  Ongoing  -EC     STG 1 Progress Comments  none reported today but still reports occasional soreness and pain.   -EC        Long Term Goals    LTG Date to Achieve  07/11/20  -EC     LTG 1  Pt will score 14 or less on the NDI.   -EC     LTG 1 Progress  Ongoing  -EC     LTG 1 Progress Comments  21 today  -EC     LTG 2  Pt will demonstrate 65 degrees or greater cervical rotation (B).   -EC     LTG 2 Progress  Partially Met  -EC     LTG 2 Progress Comments  L rotation 62 degrees R 67 degrees AROM  -EC     LTG 3  Pt will be independent with HEP to include mobility and postural strengthening.   -EC     LTG 3 Progress  Ongoing  -EC     LTG 3 Progress Comments  reports compliance  -EC        Time Calculation    PT Goal Re-Cert Due Date  07/09/20  -EC       User Key  (r) = Recorded By, (t) = Taken By, (c) = Cosigned By    Initials Name Provider Type    Sharan Coffey PTA Physical Therapy Assistant          Therapy Education  Education Details: proper spinal loading  Given: Symptoms/condition management, Posture/body mechanics  How Provided: Verbal, Demonstration  Provided to: Patient  Level of Understanding: Verbalized, Demonstrated    Outcome Measure  Options: Neck Disability Index (NDI)  Neck Disability Index  Section 1 - Pain Intensity: The pain is very mild at the moment.  Section 2 - Personal Care: I can look after myself normally, but it causes extra pain.  Section 3 - Lifting: I can lift heavy weights, but it gives me extra pain.  Section 4 - Work: I can only do my usual work, but no more  Section 5 - Headaches: I have severe headaches that come frequently.  Section 6 - Concentration: I can concentrate fully with slight difficulty.  Section 7 - Sleeping: My sleep is greatly disturbed for up to 3-5 hours.  Section 8 - Driving: I can drive as long as I want with slight neck pain.  Section 9 - Reading: I can't read as much as I want because of severe neck pain.  Section 10 - Recreation: I have neck pain with most recreational activities.  Neck Disability Index Score: 21      Time Calculation:          PT G-Codes  Outcome Measure Options: Neck Disability Index (NDI)  Neck Disability Index Score: 21         Callum Boyer, PT DPT  6/9/2020

## 2020-06-16 ENCOUNTER — TREATMENT (OUTPATIENT)
Dept: PHYSICAL THERAPY | Facility: CLINIC | Age: 40
End: 2020-06-16

## 2020-06-16 DIAGNOSIS — M54.2 PAIN, NECK: Primary | ICD-10-CM

## 2020-06-16 DIAGNOSIS — M54.12 RADICULOPATHY, CERVICAL: ICD-10-CM

## 2020-06-16 PROCEDURE — 97110 THERAPEUTIC EXERCISES: CPT | Performed by: PHYSICAL THERAPIST

## 2020-06-16 NOTE — PROGRESS NOTES
Outpatient Physical Therapy Ortho Treatment Note       Patient Name: Mandy Barrera  : 1980  MRN: [id number]  Today's Date: 2020      Visit Date: 2020    Visit Dx:    ICD-10-CM ICD-9-CM   1. Pain, neck M54.2 723.1   2. Radiculopathy, cervical M54.12 723.4       Patient Active Problem List   Diagnosis   • Localization-related symptomatic epilepsy and epileptic syndromes with simple partial seizures, not intractable, without status epilepticus (CMS/HCC)   • Cerebral AVM   • S/P craniotomy   • Generalized headaches   • Malabsorption of iron   • Anemia        Past Medical History:   Diagnosis Date   • Cerebral aneurysm    • GERD (gastroesophageal reflux disease)    • H/O: hysterectomy    • Hypertension    • Iron deficiency anemia    • Seizure (CMS/HCC)         Past Surgical History:   Procedure Laterality Date   • APPENDECTOMY     •  SECTION      x2   • CHOLECYSTECTOMY     • COLONOSCOPY     • CRANIOTOMY     • ENDOMETRIAL ABLATION     • HYSTERECTOMY                         PT Assessment/Plan     Row Name 20 0800          PT Assessment    Assessment Comments  She requires cueing to widen her SAMMI with therex activities but overall she is doing well and I feel we're approaching D/C status.  -EC        PT Plan    PT Plan Comments  Review and bolster HEP.  -EC       User Key  (r) = Recorded By, (t) = Taken By, (c) = Cosigned By    Initials Name Provider Type    Sharan Coffey PTA Physical Therapy Assistant            OP Exercises     Row Name 20 0800             Precautions    Existing Precautions/Restrictions  (S) seizures  -EC         Subjective Comments    Subjective Comments  No new complaints, states she left the tape on for 5 days stating she wanted to see how lomg it would stay on and there was no adverse reaction   -EC         Subjective Pain    Able to rate subjective pain?  yes  -EC      Pre-Treatment Pain Level  0  -EC      Post-Treatment Pain Level  0  -EC          Total Minutes    45896 - PT Therapeutic Exercise Minutes  42  -EC         Exercise 1    Exercise Name 1  progressive thoracic stretch on 1/2 foam roller  -EC      Time 1  5 min  -EC         Exercise 2    Exercise Name 2  applied Sure prep, and KT B UT for inhabition  -EC         Exercise 3    Exercise Name 3  Shaunna Chu @ [employer] #2  -EC      Reps 3  20  -EC         Exercise 4    Exercise Name 4  standing against bolster against wall UE phasic with red T band  -EC      Reps 4  20  -EC         Exercise 5    Exercise Name 5  seated marching on 75 cm SB with alt shoulder flexion  -EC      Reps 5  20  -EC         Exercise 6    Exercise Name 6  juliet side planks  -EC      Sets 6  3  -EC      Time 6  30 sec each  -EC         Exercise 7    Exercise Name 7  B unilateral supine serratus punches with #3  -EC      Reps 7  20  -EC        User Key  (r) = Recorded By, (t) = Taken By, (c) = Cosigned By    Initials Name Provider Type    Sharan Coffey PTA Physical Therapy Assistant                       PT OP Goals     Row Name 06/16/20 0800          PT Short Term Goals    STG Date to Achieve  06/11/20  -EC     STG 1  Pt will report pain no greater than 2-3/10 throughout the day.   -EC     STG 1 Progress  Ongoing  -EC     STG 1 Progress Comments  0/10 today  -EC        Long Term Goals    LTG Date to Achieve  07/11/20  -EC     LTG 1  Pt will score 14 or less on the NDI.   -EC     LTG 1 Progress  Ongoing  -EC     LTG 2  Pt will demonstrate 65 degrees or greater cervical rotation (B).   -EC     LTG 2 Progress  Partially Met  -EC     LTG 3  Pt will be independent with HEP to include mobility and postural strengthening.   -EC     LTG 3 Progress  Ongoing  -EC        Time Calculation    PT Goal Re-Cert Due Date  07/09/20  -EC       User Key  (r) = Recorded By, (t) = Taken By, (c) = Cosigned By    Initials Name Provider Type    Sharan Coffey PTA Physical Therapy Assistant          Therapy Education  Given: Posture/body  mechanics  How Provided: Demonstration, Verbal  Provided to: Patient  Level of Understanding: Verbalized              Time Calculation:                    Sharan Mchugh, PTA  6/16/2020

## 2020-06-23 ENCOUNTER — TREATMENT (OUTPATIENT)
Dept: PHYSICAL THERAPY | Facility: CLINIC | Age: 40
End: 2020-06-23

## 2020-06-23 DIAGNOSIS — M54.2 PAIN, NECK: Primary | ICD-10-CM

## 2020-06-23 PROCEDURE — 97110 THERAPEUTIC EXERCISES: CPT | Performed by: PHYSICAL THERAPIST

## 2020-06-23 PROCEDURE — 97140 MANUAL THERAPY 1/> REGIONS: CPT | Performed by: PHYSICAL THERAPIST

## 2020-06-23 NOTE — PROGRESS NOTES
Outpatient Physical Therapy Ortho Treatment Note       Patient Name: Mandy Barrera  : 1980  MRN: [id number]  Today's Date: 2020      Visit Date: 2020    Visit Dx:    ICD-10-CM ICD-9-CM   1. Pain, neck M54.2 723.1       Patient Active Problem List   Diagnosis   • Localization-related symptomatic epilepsy and epileptic syndromes with simple partial seizures, not intractable, without status epilepticus (CMS/HCC)   • Cerebral AVM   • S/P craniotomy   • Generalized headaches   • Malabsorption of iron   • Anemia        Past Medical History:   Diagnosis Date   • Cerebral aneurysm    • GERD (gastroesophageal reflux disease)    • H/O: hysterectomy    • Hypertension    • Iron deficiency anemia    • Seizure (CMS/HCC)         Past Surgical History:   Procedure Laterality Date   • APPENDECTOMY     •  SECTION      x2   • CHOLECYSTECTOMY     • COLONOSCOPY     • CRANIOTOMY     • ENDOMETRIAL ABLATION     • HYSTERECTOMY                         PT Assessment/Plan     Row Name 20 0800          PT Assessment    Assessment Comments  Due to her report of increased pain I did not make any changes to her HEP this date.  -EC        PT Plan    PT Plan Comments  Consider updating her HEP and continue to progress her scapular stability.  -EC       User Key  (r) = Recorded By, (t) = Taken By, (c) = Cosigned By    Initials Name Provider Type    Sharan Coffey PTA Physical Therapy Assistant            OP Exercises     Row Name 20 0800 20 0700          Precautions    Existing Precautions/Restrictions  (S) seizures  -EC  --        Subjective Comments    Subjective Comments  She took off the tape  and it burned in a few spots reports more neck pain today since Friday.   -EC  --        Subjective Pain    Able to rate subjective pain?  yes  -EC  --     Pre-Treatment Pain Level  7  -EC  --        Total Minutes    50992 - PT Therapeutic Exercise Minutes  28  -EC  --     15705 - PT Manual  Therapy Minutes  --  15  -EC        Exercise 1    Exercise Name 1  plantargrade BOSU rocking  -EC  --     Sets 1  3  -EC  --     Time 1  1 min  -EC  --        Exercise 2    Exercise Name 2  B unilateral SL shoulder ER with #2  -EC  --     Reps 2  20  -EC  --        Exercise 3    Exercise Name 3  B unilateral prone rows with #2   -EC  --     Reps 3  20  -EC  --        Exercise 4    Exercise Name 4  B unilateral prone shoulder ext with #2  -EC  --       User Key  (r) = Recorded By, (t) = Taken By, (c) = Cosigned By    Initials Name Provider Type    Sharan Coffey PTA Physical Therapy Assistant                      Manual Rx (last 36 hours)      Manual Treatments     Row Name 06/23/20 0700             Total Minutes    14297 - PT Manual Therapy Minutes  15  -EC         Manual Rx 1    Manual Rx 1 Location  B UT,LS, and cervical paraspinals  -EC      Manual Rx 1 Type  STM in sitting with B UE unweighted  -EC      Manual Rx 1 Duration  15  -EC        User Key  (r) = Recorded By, (t) = Taken By, (c) = Cosigned By    Initials Name Provider Type    Sharan Coffey PTA Physical Therapy Assistant          PT OP Goals     Row Name 06/23/20 0800          PT Short Term Goals    STG Date to Achieve  06/11/20  -EC     STG 1  Pt will report pain no greater than 2-3/10 throughout the day.   -EC     STG 1 Progress  Ongoing  -EC     STG 1 Progress Comments  7/10 prior to treatment today  -EC        Long Term Goals    LTG Date to Achieve  07/11/20  -EC     LTG 1  Pt will score 14 or less on the NDI.   -EC     LTG 1 Progress  Ongoing  -EC     LTG 2  Pt will demonstrate 65 degrees or greater cervical rotation (B).   -EC     LTG 2 Progress  Partially Met  -EC     LTG 3  Pt will be independent with HEP to include mobility and postural strengthening.   -EC     LTG 3 Progress  Ongoing  -EC       User Key  (r) = Recorded By, (t) = Taken By, (c) = Cosigned By    Initials Name Provider Type    Sharan Coffey PTA Physical  Therapy Assistant                         Time Calculation:                    Sharan Mchugh, JUAN  6/23/2020

## 2020-06-30 ENCOUNTER — TREATMENT (OUTPATIENT)
Dept: PHYSICAL THERAPY | Facility: CLINIC | Age: 40
End: 2020-06-30

## 2020-06-30 DIAGNOSIS — M54.12 RADICULOPATHY, CERVICAL: ICD-10-CM

## 2020-06-30 DIAGNOSIS — M54.2 PAIN, NECK: Primary | ICD-10-CM

## 2020-06-30 PROCEDURE — 97110 THERAPEUTIC EXERCISES: CPT | Performed by: PHYSICAL THERAPIST

## 2020-06-30 NOTE — PROGRESS NOTES
Outpatient Physical Therapy Ortho Treatment Note       Patient Name: Mandy Barrera  : 1980  MRN: [id number]  Today's Date: 2020      Visit Date: 2020    Visit Dx:    ICD-10-CM ICD-9-CM   1. Pain, neck M54.2 723.1   2. Radiculopathy, cervical M54.12 723.4       Patient Active Problem List   Diagnosis   • Localization-related symptomatic epilepsy and epileptic syndromes with simple partial seizures, not intractable, without status epilepticus (CMS/HCC)   • Cerebral AVM   • S/P craniotomy   • Generalized headaches   • Malabsorption of iron   • Anemia        Past Medical History:   Diagnosis Date   • Cerebral aneurysm    • GERD (gastroesophageal reflux disease)    • H/O: hysterectomy    • Hypertension    • Iron deficiency anemia    • Seizure (CMS/HCC)         Past Surgical History:   Procedure Laterality Date   • APPENDECTOMY     •  SECTION      x2   • CHOLECYSTECTOMY     • COLONOSCOPY     • CRANIOTOMY     • ENDOMETRIAL ABLATION     • HYSTERECTOMY                         PT Assessment/Plan     Row Name 20 0700          PT Assessment    Assessment Comments  Overall she is doing well and I feel that she is approaching D/C status.  -EC        PT Plan    PT Plan Comments  Review all goals and likely place POC on hold.  -EC       User Key  (r) = Recorded By, (t) = Taken By, (c) = Cosigned By    Initials Name Provider Type    Sharan Coffey PTA Physical Therapy Assistant            OP Exercises     Row Name 20 0700             Precautions    Existing Precautions/Restrictions  (S) seizures  -EC         Subjective Comments    Subjective Comments  She denies neck pain but has issues with her R foot and has to wear a cam walking boot  -EC         Subjective Pain    Able to rate subjective pain?  yes  -EC      Pre-Treatment Pain Level  0  -EC      Post-Treatment Pain Level  0  -EC         Total Minutes    95111 - PT Therapeutic Exercise Minutes  45  -EC         Exercise 1     "Exercise Name 1  prone #2 wand \"Y's\"  -EC      Reps 1  15  -EC         Exercise 2    Exercise Name 2  prone B unilateral #2 wand twisty's   -EC      Sets 2  3  -EC      Time 2  30 sec  -EC         Exercise 3    Exercise Name 3  B unilateral prone rows with #3  -EC      Reps 3  20  -EC         Exercise 4    Exercise Name 4  progressive thoracic stretch on pink roller  -EC      Time 4  5 min  -EC         Exercise 5    Exercise Name 5  HL marching on 1/2 foam roller  -EC      Reps 5  10  -EC         Exercise 6    Exercise Name 6  HL B shoulder flexion with green T band  -EC      Reps 6  20  -EC         Exercise 7    Exercise Name 7  HL \"X\" pattern with green T band  -EC      Reps 7  20  -EC         Exercise 8    Exercise Name 8  plantargrade BOSU rocking  -EC      Sets 8  5  -EC      Time 8  1 min each  -EC         Exercise 9    Exercise Name 9  Payloff Presses @ [employer]#3  -EC      Reps 9  20  -EC         Exercise 10    Exercise Name 10  B unilateral Thor's Hammer Press with green T band  -EC      Sets 10  2 each   -EC      Time 10  30 sec each  -EC        User Key  (r) = Recorded By, (t) = Taken By, (c) = Cosigned By    Initials Name Provider Type    EC Sharan Mchugh, PTA Physical Therapy Assistant                       PT OP Goals     Row Name 06/30/20 0700          PT Short Term Goals    STG Date to Achieve  06/11/20  -EC     STG 1  Pt will report pain no greater than 2-3/10 throughout the day.   -EC     STG 1 Progress  Ongoing  -EC     STG 1 Progress Comments  none reported today  -EC        Long Term Goals    LTG Date to Achieve  07/11/20  -EC     LTG 1  Pt will score 14 or less on the NDI.   -EC     LTG 1 Progress  Ongoing  -EC     LTG 2  Pt will demonstrate 65 degrees or greater cervical rotation (B).   -EC     LTG 2 Progress  Partially Met  -EC     LTG 3  Pt will be independent with HEP to include mobility and postural strengthening.   -EC     LTG 3 Progress  Ongoing  -EC        Time Calculation    PT Goal " Re-Cert Due Date  07/09/20  -       User Key  (r) = Recorded By, (t) = Taken By, (c) = Cosigned By    Initials Name Provider Type    Sharan Coffey PTA Physical Therapy Assistant          Therapy Education  Given: Symptoms/condition management  How Provided: Verbal  Provided to: Patient  Level of Understanding: Verbalized              Time Calculation:                    Sharan Mchugh PTA  6/30/2020

## 2020-08-03 ENCOUNTER — DOCUMENTATION (OUTPATIENT)
Dept: PHYSICAL THERAPY | Facility: CLINIC | Age: 40
End: 2020-08-03

## 2020-08-03 DIAGNOSIS — M54.2 PAIN, NECK: Primary | ICD-10-CM

## 2020-08-03 DIAGNOSIS — M54.12 RADICULOPATHY, CERVICAL: ICD-10-CM

## 2020-08-03 NOTE — PROGRESS NOTES
I have reviewed the notes, assessments, and/or procedures performed by Sharan Mchugh, I concur with her/his documentation of Mandy Barrera.

## 2020-08-03 NOTE — PROGRESS NOTES
Outpatient Physical Therapy Discharge Summary         Patient Name: Mandy Barrera  : 1980  MRN: [id number]    Today's Date: 8/3/2020    Visit Dx:    ICD-10-CM ICD-9-CM   1. Pain, neck M54.2 723.1   2. Radiculopathy, cervical M54.12 723.4       PT OP Goals     Row Name 20 0900          PT Short Term Goals    STG Date to Achieve  20  -EC     STG 1  Pt will report pain no greater than 2-3/10 throughout the day.   -EC     STG 1 Progress  Met  -EC        Long Term Goals    LTG Date to Achieve  20  -EC     LTG 1  Pt will score 14 or less on the NDI.   -EC     LTG 1 Progress  Not Met  -EC     LTG 2  Pt will demonstrate 65 degrees or greater cervical rotation (B).   -EC     LTG 2 Progress  Partially Met  -EC     LTG 3  Pt will be independent with HEP to include mobility and postural strengthening.   -EC     LTG 3 Progress  Met  -EC       User Key  (r) = Recorded By, (t) = Taken By, (c) = Cosigned By    Initials Name Provider Type    Sharan Coffey PTA Physical Therapy Assistant          OP PT Discharge Summary  Date of Discharge: 20  Reason for Discharge: All goals achieved  Outcomes Achieved: Able to achieve all goals within established timeline  Discharge Destination: Home with home program  Discharge Instructions/Additional Comments: We have been working with this patient to address her neck, HA, and R shoulder pain by working on her B scapular stability, postural control and awareness, as well as cervical flexibility and mobility. Overall her symptoms are signifcantly improved and she is independent and complaint with her comprehensive HEP and will continue to progress without requiring continued skilled services.      Time Calculation:                    Sharan Mchugh PTA  8/3/2020

## 2020-08-18 ENCOUNTER — LAB (OUTPATIENT)
Dept: LAB | Facility: HOSPITAL | Age: 40
End: 2020-08-18

## 2020-08-18 ENCOUNTER — TRANSCRIBE ORDERS (OUTPATIENT)
Dept: ADMINISTRATIVE | Facility: HOSPITAL | Age: 40
End: 2020-08-18

## 2020-08-18 DIAGNOSIS — R73.9 ELEVATED BLOOD SUGAR: ICD-10-CM

## 2020-08-18 DIAGNOSIS — D72.829 LEUKOCYTOSIS, UNSPECIFIED TYPE: ICD-10-CM

## 2020-08-18 DIAGNOSIS — E78.5 HYPERLIPIDEMIA, UNSPECIFIED HYPERLIPIDEMIA TYPE: Primary | ICD-10-CM

## 2020-08-18 DIAGNOSIS — Z00.00 ENCOUNTER FOR GENERAL ADULT MEDICAL EXAMINATION WITHOUT ABNORMAL FINDINGS: ICD-10-CM

## 2020-08-18 DIAGNOSIS — E78.5 HYPERLIPIDEMIA, UNSPECIFIED HYPERLIPIDEMIA TYPE: ICD-10-CM

## 2020-08-18 DIAGNOSIS — R31.9 HEMATURIA, UNSPECIFIED TYPE: ICD-10-CM

## 2020-08-18 LAB
ALBUMIN SERPL-MCNC: 4.2 G/DL (ref 3.5–5.2)
ALBUMIN/GLOB SERPL: 1.3 G/DL
ALP SERPL-CCNC: 82 U/L (ref 39–117)
ALT SERPL W P-5'-P-CCNC: 15 U/L (ref 1–33)
ANION GAP SERPL CALCULATED.3IONS-SCNC: 14 MMOL/L (ref 5–15)
AST SERPL-CCNC: 15 U/L (ref 1–32)
BASOPHILS # BLD AUTO: 0.06 10*3/MM3 (ref 0–0.2)
BASOPHILS NFR BLD AUTO: 0.5 % (ref 0–1.5)
BILIRUB SERPL-MCNC: 0.4 MG/DL (ref 0–1.2)
BUN SERPL-MCNC: 12 MG/DL (ref 6–20)
BUN/CREAT SERPL: 17.4 (ref 7–25)
CALCIUM SPEC-SCNC: 9.4 MG/DL (ref 8.6–10.5)
CHLORIDE SERPL-SCNC: 101 MMOL/L (ref 98–107)
CHOLEST SERPL-MCNC: 185 MG/DL (ref 0–200)
CO2 SERPL-SCNC: 22 MMOL/L (ref 22–29)
CREAT SERPL-MCNC: 0.69 MG/DL (ref 0.57–1)
DEPRECATED RDW RBC AUTO: 43.4 FL (ref 37–54)
EOSINOPHIL # BLD AUTO: 0.25 10*3/MM3 (ref 0–0.4)
EOSINOPHIL NFR BLD AUTO: 2.2 % (ref 0.3–6.2)
ERYTHROCYTE [DISTWIDTH] IN BLOOD BY AUTOMATED COUNT: 14.3 % (ref 12.3–15.4)
GFR SERPL CREATININE-BSD FRML MDRD: 115 ML/MIN/1.73
GLOBULIN UR ELPH-MCNC: 3.2 GM/DL
GLUCOSE SERPL-MCNC: 106 MG/DL (ref 65–99)
HBA1C MFR BLD: 6.4 % (ref 4.8–5.6)
HCT VFR BLD AUTO: 37.7 % (ref 34–46.6)
HDLC SERPL-MCNC: 52 MG/DL (ref 40–60)
HGB BLD-MCNC: 12.2 G/DL (ref 12–15.9)
IMM GRANULOCYTES # BLD AUTO: 0.04 10*3/MM3 (ref 0–0.05)
IMM GRANULOCYTES NFR BLD AUTO: 0.4 % (ref 0–0.5)
LDLC SERPL CALC-MCNC: 107 MG/DL (ref 0–100)
LDLC/HDLC SERPL: 2.05 {RATIO}
LYMPHOCYTES # BLD AUTO: 3.01 10*3/MM3 (ref 0.7–3.1)
LYMPHOCYTES NFR BLD AUTO: 26.7 % (ref 19.6–45.3)
MCH RBC QN AUTO: 27.2 PG (ref 26.6–33)
MCHC RBC AUTO-ENTMCNC: 32.4 G/DL (ref 31.5–35.7)
MCV RBC AUTO: 84 FL (ref 79–97)
MONOCYTES # BLD AUTO: 0.63 10*3/MM3 (ref 0.1–0.9)
MONOCYTES NFR BLD AUTO: 5.6 % (ref 5–12)
NEUTROPHILS NFR BLD AUTO: 64.6 % (ref 42.7–76)
NEUTROPHILS NFR BLD AUTO: 7.3 10*3/MM3 (ref 1.7–7)
NRBC BLD AUTO-RTO: 0 /100 WBC (ref 0–0.2)
PLATELET # BLD AUTO: 275 10*3/MM3 (ref 140–450)
PMV BLD AUTO: 10.4 FL (ref 6–12)
POTASSIUM SERPL-SCNC: 4.1 MMOL/L (ref 3.5–5.2)
PROT SERPL-MCNC: 7.4 G/DL (ref 6–8.5)
RBC # BLD AUTO: 4.49 10*6/MM3 (ref 3.77–5.28)
SODIUM SERPL-SCNC: 137 MMOL/L (ref 136–145)
TRIGL SERPL-MCNC: 131 MG/DL (ref 0–150)
VLDLC SERPL-MCNC: 26.2 MG/DL
WBC # BLD AUTO: 11.29 10*3/MM3 (ref 3.4–10.8)

## 2020-08-18 PROCEDURE — 87186 SC STD MICRODIL/AGAR DIL: CPT

## 2020-08-18 PROCEDURE — 36415 COLL VENOUS BLD VENIPUNCTURE: CPT

## 2020-08-18 PROCEDURE — 85025 COMPLETE CBC W/AUTO DIFF WBC: CPT

## 2020-08-18 PROCEDURE — 87086 URINE CULTURE/COLONY COUNT: CPT

## 2020-08-18 PROCEDURE — 81001 URINALYSIS AUTO W/SCOPE: CPT

## 2020-08-18 PROCEDURE — 80053 COMPREHEN METABOLIC PANEL: CPT | Performed by: INTERNAL MEDICINE

## 2020-08-18 PROCEDURE — 87077 CULTURE AEROBIC IDENTIFY: CPT

## 2020-08-18 PROCEDURE — 80061 LIPID PANEL: CPT | Performed by: INTERNAL MEDICINE

## 2020-08-18 PROCEDURE — 83036 HEMOGLOBIN GLYCOSYLATED A1C: CPT

## 2020-08-19 ENCOUNTER — TRANSCRIBE ORDERS (OUTPATIENT)
Dept: ADMINISTRATIVE | Facility: HOSPITAL | Age: 40
End: 2020-08-19

## 2020-08-19 DIAGNOSIS — R31.9 HEMATURIA, UNSPECIFIED TYPE: Primary | ICD-10-CM

## 2020-08-19 LAB
BACTERIA UR QL AUTO: ABNORMAL /HPF
BILIRUB UR QL STRIP: NEGATIVE
CLARITY UR: ABNORMAL
COLOR UR: YELLOW
GLUCOSE UR STRIP-MCNC: NEGATIVE MG/DL
HGB UR QL STRIP.AUTO: ABNORMAL
HYALINE CASTS UR QL AUTO: ABNORMAL /LPF
KETONES UR QL STRIP: NEGATIVE
LEUKOCYTE ESTERASE UR QL STRIP.AUTO: ABNORMAL
NITRITE UR QL STRIP: NEGATIVE
PH UR STRIP.AUTO: 6.5 [PH] (ref 5–8)
PROT UR QL STRIP: ABNORMAL
RBC # UR: ABNORMAL /HPF
REF LAB TEST METHOD: ABNORMAL
SP GR UR STRIP: 1.02 (ref 1–1.03)
SQUAMOUS #/AREA URNS HPF: ABNORMAL /HPF
UROBILINOGEN UR QL STRIP: ABNORMAL
WBC UR QL AUTO: ABNORMAL /HPF
YEAST URNS QL MICRO: ABNORMAL /HPF

## 2020-08-21 LAB — BACTERIA SPEC AEROBE CULT: ABNORMAL

## 2020-08-28 ENCOUNTER — HOSPITAL ENCOUNTER (EMERGENCY)
Facility: HOSPITAL | Age: 40
Discharge: HOME | End: 2020-08-28
Admitting: EMERGENCY MEDICINE

## 2020-08-28 ENCOUNTER — APPOINTMENT (OUTPATIENT)
Dept: CT IMAGING | Facility: HOSPITAL | Age: 40
End: 2020-08-28

## 2020-08-28 ENCOUNTER — APPOINTMENT (OUTPATIENT)
Dept: GENERAL RADIOLOGY | Facility: HOSPITAL | Age: 40
End: 2020-08-28

## 2020-08-28 VITALS
WEIGHT: 190 LBS | HEART RATE: 86 BPM | HEIGHT: 63 IN | SYSTOLIC BLOOD PRESSURE: 135 MMHG | OXYGEN SATURATION: 98 % | DIASTOLIC BLOOD PRESSURE: 96 MMHG | BODY MASS INDEX: 33.66 KG/M2 | RESPIRATION RATE: 17 BRPM | TEMPERATURE: 97.6 F

## 2020-08-28 DIAGNOSIS — S16.1XXA STRAIN OF NECK MUSCLE, INITIAL ENCOUNTER: ICD-10-CM

## 2020-08-28 DIAGNOSIS — E04.1 THYROID NODULE: ICD-10-CM
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
## 2020-08-28 DIAGNOSIS — R07.89 CHEST WALL PAIN: ICD-10-CM

## 2020-08-28 LAB
ALBUMIN SERPL-MCNC: 4.3 G/DL (ref 3.5–5.2)
ALBUMIN/GLOB SERPL: 1.4 G/DL
ALP SERPL-CCNC: 71 U/L (ref 39–117)
ALT SERPL W P-5'-P-CCNC: 16 U/L (ref 1–33)
ANION GAP SERPL CALCULATED.3IONS-SCNC: 11 MMOL/L (ref 5–15)
AST SERPL-CCNC: 17 U/L (ref 1–32)
BASOPHILS # BLD AUTO: 0.06 10*3/MM3 (ref 0–0.2)
BASOPHILS NFR BLD AUTO: 0.6 % (ref 0–1.5)
BILIRUB SERPL-MCNC: 0.3 MG/DL (ref 0–1.2)
BILIRUB UR QL STRIP: NEGATIVE
BUN SERPL-MCNC: 14 MG/DL (ref 6–20)
BUN/CREAT SERPL: 16.7 (ref 7–25)
CALCIUM SPEC-SCNC: 9.3 MG/DL (ref 8.6–10.5)
CHLORIDE SERPL-SCNC: 102 MMOL/L (ref 98–107)
CLARITY UR: CLEAR
CO2 SERPL-SCNC: 26 MMOL/L (ref 22–29)
COLOR UR: YELLOW
CREAT SERPL-MCNC: 0.84 MG/DL (ref 0.57–1)
DEPRECATED RDW RBC AUTO: 44.7 FL (ref 37–54)
EOSINOPHIL # BLD AUTO: 0.28 10*3/MM3 (ref 0–0.4)
EOSINOPHIL NFR BLD AUTO: 2.8 % (ref 0.3–6.2)
ERYTHROCYTE [DISTWIDTH] IN BLOOD BY AUTOMATED COUNT: 14.4 % (ref 12.3–15.4)
GFR SERPL CREATININE-BSD FRML MDRD: 91 ML/MIN/1.73
GLOBULIN UR ELPH-MCNC: 3 GM/DL
GLUCOSE SERPL-MCNC: 109 MG/DL (ref 65–99)
GLUCOSE UR STRIP-MCNC: NEGATIVE MG/DL
HCT VFR BLD AUTO: 36.6 % (ref 34–46.6)
HGB BLD-MCNC: 12.1 G/DL (ref 12–15.9)
HGB UR QL STRIP.AUTO: NEGATIVE
IMM GRANULOCYTES # BLD AUTO: 0.03 10*3/MM3 (ref 0–0.05)
IMM GRANULOCYTES NFR BLD AUTO: 0.3 % (ref 0–0.5)
KETONES UR QL STRIP: NEGATIVE
LEUKOCYTE ESTERASE UR QL STRIP.AUTO: NEGATIVE
LIPASE SERPL-CCNC: 33 U/L (ref 13–60)
LYMPHOCYTES # BLD AUTO: 2.37 10*3/MM3 (ref 0.7–3.1)
LYMPHOCYTES NFR BLD AUTO: 23.9 % (ref 19.6–45.3)
MCH RBC QN AUTO: 27.9 PG (ref 26.6–33)
MCHC RBC AUTO-ENTMCNC: 33.1 G/DL (ref 31.5–35.7)
MCV RBC AUTO: 84.3 FL (ref 79–97)
MONOCYTES # BLD AUTO: 0.63 10*3/MM3 (ref 0.1–0.9)
MONOCYTES NFR BLD AUTO: 6.4 % (ref 5–12)
NEUTROPHILS NFR BLD AUTO: 6.55 10*3/MM3 (ref 1.7–7)
NEUTROPHILS NFR BLD AUTO: 66 % (ref 42.7–76)
NITRITE UR QL STRIP: NEGATIVE
NRBC BLD AUTO-RTO: 0 /100 WBC (ref 0–0.2)
PH UR STRIP.AUTO: 7.5 [PH] (ref 5–8)
PLATELET # BLD AUTO: 271 10*3/MM3 (ref 140–450)
PMV BLD AUTO: 10.3 FL (ref 6–12)
POTASSIUM SERPL-SCNC: 3.8 MMOL/L (ref 3.5–5.2)
PROT SERPL-MCNC: 7.3 G/DL (ref 6–8.5)
PROT UR QL STRIP: NEGATIVE
RBC # BLD AUTO: 4.34 10*6/MM3 (ref 3.77–5.28)
SODIUM SERPL-SCNC: 139 MMOL/L (ref 136–145)
SP GR UR STRIP: 1.02 (ref 1–1.03)
TROPONIN T SERPL-MCNC: <0.01 NG/ML (ref 0–0.03)
UROBILINOGEN UR QL STRIP: NORMAL
WBC # BLD AUTO: 9.92 10*3/MM3 (ref 3.4–10.8)

## 2020-08-28 PROCEDURE — 80053 COMPREHEN METABOLIC PANEL: CPT | Performed by: NURSE PRACTITIONER

## 2020-08-28 PROCEDURE — 85025 COMPLETE CBC W/AUTO DIFF WBC: CPT | Performed by: NURSE PRACTITIONER

## 2020-08-28 PROCEDURE — 71045 X-RAY EXAM CHEST 1 VIEW: CPT

## 2020-08-28 PROCEDURE — 93005 ELECTROCARDIOGRAM TRACING: CPT | Performed by: NURSE PRACTITIONER

## 2020-08-28 PROCEDURE — 70450 CT HEAD/BRAIN W/O DYE: CPT

## 2020-08-28 PROCEDURE — 71260 CT THORAX DX C+: CPT

## 2020-08-28 PROCEDURE — 25010000002 IOPAMIDOL 61 % SOLUTION: Performed by: NURSE PRACTITIONER

## 2020-08-28 PROCEDURE — 83690 ASSAY OF LIPASE: CPT | Performed by: NURSE PRACTITIONER

## 2020-08-28 PROCEDURE — 73502 X-RAY EXAM HIP UNI 2-3 VIEWS: CPT

## 2020-08-28 PROCEDURE — 72125 CT NECK SPINE W/O DYE: CPT

## 2020-08-28 PROCEDURE — 36415 COLL VENOUS BLD VENIPUNCTURE: CPT | Performed by: NURSE PRACTITIONER

## 2020-08-28 PROCEDURE — 74177 CT ABD & PELVIS W/CONTRAST: CPT

## 2020-08-28 PROCEDURE — 84484 ASSAY OF TROPONIN QUANT: CPT | Performed by: NURSE PRACTITIONER

## 2020-08-28 PROCEDURE — 81003 URINALYSIS AUTO W/O SCOPE: CPT | Performed by: NURSE PRACTITIONER

## 2020-08-28 PROCEDURE — 99284 EMERGENCY DEPT VISIT MOD MDM: CPT

## 2020-08-28 PROCEDURE — 93010 ELECTROCARDIOGRAM REPORT: CPT | Performed by: INTERNAL MEDICINE

## 2020-08-28 RX ORDER — CYCLOBENZAPRINE HCL 10 MG
10 TABLET ORAL 3 TIMES DAILY PRN
Qty: 15 TABLET | Refills: 0 | Status: SHIPPED | OUTPATIENT
Start: 2020-08-28 | End: 2020-12-17

## 2020-08-28 RX ORDER — ACETAMINOPHEN 500 MG
1000 TABLET ORAL ONCE
Status: COMPLETED | OUTPATIENT
Start: 2020-08-28 | End: 2020-08-28

## 2020-08-28 RX ADMIN — IOPAMIDOL 100 ML: 612 INJECTION, SOLUTION INTRAVENOUS at 18:02

## 2020-08-28 RX ADMIN — ACETAMINOPHEN 1000 MG: 500 TABLET, FILM COATED ORAL at 17:52

## 2020-08-31 ENCOUNTER — EPISODE CHANGES (OUTPATIENT)
Dept: CASE MANAGEMENT | Facility: OTHER | Age: 40
End: 2020-08-31

## 2020-09-02 ENCOUNTER — TRANSCRIBE ORDERS (OUTPATIENT)
Dept: ADMINISTRATIVE | Facility: HOSPITAL | Age: 40
End: 2020-09-02

## 2020-09-02 DIAGNOSIS — E04.1 NONTOXIC SINGLE THYROID NODULE: Primary | ICD-10-CM

## 2020-09-03 ENCOUNTER — HOSPITAL ENCOUNTER (OUTPATIENT)
Dept: ULTRASOUND IMAGING | Facility: HOSPITAL | Age: 40
Discharge: HOME | End: 2020-09-03
Admitting: INTERNAL MEDICINE

## 2020-09-03 DIAGNOSIS — E04.1 NONTOXIC SINGLE THYROID NODULE: ICD-10-CM

## 2020-09-03 PROCEDURE — 76536 US EXAM OF HEAD AND NECK: CPT

## 2020-09-09 ENCOUNTER — TELEPHONE (OUTPATIENT)
Dept: OTOLARYNGOLOGY | Facility: CLINIC | Age: 40
End: 2020-09-09

## 2020-09-09 NOTE — TELEPHONE ENCOUNTER
Called Patient's mother to schedule appt, unable to reach Patient on number listed. Left VM on mother's phone to have Patient call back and schedule.

## 2020-09-14 ENCOUNTER — OFFICE VISIT (OUTPATIENT)
Dept: OTOLARYNGOLOGY | Facility: CLINIC | Age: 40
End: 2020-09-14

## 2020-09-14 VITALS
SYSTOLIC BLOOD PRESSURE: 141 MMHG | DIASTOLIC BLOOD PRESSURE: 94 MMHG | WEIGHT: 189 LBS | HEART RATE: 88 BPM | HEIGHT: 63 IN | BODY MASS INDEX: 33.49 KG/M2

## 2020-09-14 DIAGNOSIS — E04.2 MULTIPLE THYROID NODULES: Primary | ICD-10-CM

## 2020-09-14 DIAGNOSIS — E07.89 THYROID MASS OF UNCLEAR ETIOLOGY: ICD-10-CM

## 2020-09-14 DIAGNOSIS — J34.2 ACQUIRED DEVIATED NASAL SEPTUM: ICD-10-CM

## 2020-09-14 PROCEDURE — 99203 OFFICE O/P NEW LOW 30 MIN: CPT | Performed by: OTOLARYNGOLOGY

## 2020-09-14 RX ORDER — GALCANEZUMAB 120 MG/ML
INJECTION, SOLUTION SUBCUTANEOUS
COMMUNITY
End: 2020-09-14

## 2020-09-14 NOTE — PROGRESS NOTES
Rashaun Sousa Jr, MD     ENT NEW PATIENT NOTE     Chief Complaint   Patient presents with   • Thyroid Problem     thyroid nodules        HISTORY OF PRESENT ILLNESS:  Accompanied by:   No one  Mandy Barrera is a  39 y.o. female with thyroid nodules.  She denies any symptoms.  Swallowing- no issues.  Radiation- no exposure  Family history- thyroid.  Breathing- no trouble.  Smoke- occasionally.  Drink- none    Review of Systems  Reviewed per patient intake note and confirmed with patient    Past History:  Past Medical History:   Diagnosis Date   • Cerebral aneurysm    • GERD (gastroesophageal reflux disease)    • H/O: hysterectomy    • Hypertension    • Iron deficiency anemia    • Seizure (CMS/HCC)      Past Surgical History:   Procedure Laterality Date   • APPENDECTOMY     •  SECTION      x2   • CHOLECYSTECTOMY     • COLONOSCOPY     • CRANIOTOMY     • ENDOMETRIAL ABLATION     • HYSTERECTOMY       Family History   Problem Relation Age of Onset   • Rheum arthritis Mother    • No Known Problems Brother    • No Known Problems Sister    • No Known Problems Daughter    • No Known Problems Brother    • No Known Problems Brother    • No Known Problems Brother    • No Known Problems Daughter      Social History     Tobacco Use   • Smoking status: Never Smoker   • Smokeless tobacco: Never Used   Substance Use Topics   • Alcohol use: No   • Drug use: No     No outpatient medications have been marked as taking for the 20 encounter (Office Visit) with Rashaun Sousa Jr., MD.     Allergies:  Infed [iron dextran], Hydromorphone hcl, Helotes butter, Ampicillin, Lamotrigine, Latex, Lortab [hydrocodone-acetaminophen], Morphine, Nitrofurantoin, Nuts, and Poison ivy extract        Vital Signs:   Heart Rate:  [88] 88  BP: (141)/(94) 141/94  EXAMINATION:  CONSTITUTIONAL:    well nourished, well-developed, alert, oriented, in no acute distress     BODY HABITUS:    Normal body habitus    COMMUNICATION:    able to  communicate normally, normal voice quality    HEAD:     Normocephalic, without obvious abnormality, atraumatic    FACE:    structure normal, no tenderness present, no lesions/masses, no evidence of trauma    SALIVARY GLANDS:    parotid glands with no tenderness, no swelling, no masses, submandibular glands with normal size, nontender     EYE:    ocular motility normal, eyelids normal, orbits normal, no proptosis, conjunctiva clear, sclera non-icteric, pupils equal, round, reactive to light and accomodation  Color:   brown    HEARING:    response to conversational voice normal    EARS:    Otoscopic exam   normal pinna with no lesions, Canals normal size and shape, Tympanic membranes normal, Ossicular chain intact, Middle ear clear     NOSE EXTERNAL:    APPEARANCE: normal, straight, with good projection, no tenderness, no lesions, no tenderness, good nasal support, patent nares    NOSE INTERNAL:    Anterior rhinoscopy   NASALMUCOSA:    intact mucosa, no lesions    NASAL PASSAGES:     abnormal   Bilateral- partially obstructed by septum and nasal sill    NASAL VALVE:     intact, good support and no nasal obstruction   SEPTUM:     normal mucosa without crusting or lesions  Bilateral    deviation present:      deviated Right very low and along nasal sill apart from the septum     deviated Left low, jessie mild nasal sill elevation    Nasal floor is elevated R>L causing some obstruction   INFERIOR TURBINATES:     normal size, non-obstructing, no lesions    ORAL CAVITY:    Normal lips with no lesions, dentition normal for age, FOM intact without lesions and normal salivary flow, Mucosa intact without lesions, Hard and soft palate normal without lesions  TONGUE:       hypertrophy  Moderate, prolapse   Moderate    OROPHARYNX:    Direct examination  oropharyngeal mucosa normal, tonsil(s) with normal appearance      NECK:    short, thick  mild    LYMPH NODES :    no adenopathy    THYROID:    diffuse thyroid present, no nodules  palpable     CHEST/RESPIRATORY:    respiratory effort normal, no rales, rubs or wheezing, no stridor, normal appearance to chest    CARDIOVASCULAR:    regular rate and rhythm, no murmurs, gallups, no peripheral edema    NEURO/PSYCHIATRIC :    oriented appropriately for age, mood normal, affect appropriate, cranial nerves intact grossly (unless specifically described), gait normal for age    RESULTS REVIEW:    I have reviewed the patients old records in the chart.  I reviewed the patient's new clinical results.  I have personally reviewed the patient's ultrasound of the thyroid images.  I have personally reviewed the patient's ultrasound of the thyroid report.          Thyroid U/S 9/3/2020  ASSESSMENT:     Diagnosis Plan   1. Multiple thyroid nodules  US Guided Thyroid Biopsy    Fine Needle Aspiration    R dominant  L sub cm  Both TiRADs 4   2. Thyroid mass of unclear etiology  US Guided Thyroid Biopsy    Fine Needle Aspiration    R side   3. Acquired deviated nasal septum      R to l low  Bilateral R>L elizabeth high rising           PLAN:      Medical and surgical options were discussed including observation, continued medical management, medication modification, surgical management and fine needle aspiration. Risks, benefits and alternatives were discussed and questions were answered. After considering the options, the patient decided to proceed with fine needle aspiration.  Patient has R dominant nodule. I have discussed oiptions. I will get Biopsy R nodule and then discuss findings and options with patient. No symptoms from nodule.  Thyroid Bx Right side  MY CHART:  Patient is Patient is using My Chart     Orders Placed This Encounter   Procedures   • US Guided Thyroid Biopsy          Patient understand(s) and agree(s) with the treatment plan as described.  Return after thyroid biopsy, for Recheck Thyroid.       Rashaun Sousa Jr, MD  09/14/20  09:44 CDT

## 2020-09-25 ENCOUNTER — HOSPITAL ENCOUNTER (OUTPATIENT)
Dept: ULTRASOUND IMAGING | Facility: HOSPITAL | Age: 40
Discharge: HOME | End: 2020-09-25
Admitting: RADIOLOGY

## 2020-09-25 DIAGNOSIS — E07.89 THYROID MASS OF UNCLEAR ETIOLOGY: ICD-10-CM

## 2020-09-25 DIAGNOSIS — G43.709 CHRONIC MIGRAINE WITHOUT AURA WITHOUT STATUS MIGRAINOSUS, NOT INTRACTABLE: ICD-10-CM

## 2020-09-25 DIAGNOSIS — E04.2 MULTIPLE THYROID NODULES: ICD-10-CM

## 2020-09-25 PROCEDURE — 88333 PATH CONSLTJ SURG CYTO XM 1: CPT | Performed by: OTOLARYNGOLOGY

## 2020-09-25 PROCEDURE — 88112 CYTOPATH CELL ENHANCE TECH: CPT | Performed by: OTOLARYNGOLOGY

## 2020-09-25 PROCEDURE — 76942 ECHO GUIDE FOR BIOPSY: CPT

## 2020-09-25 PROCEDURE — 88172 CYTP DX EVAL FNA 1ST EA SITE: CPT | Performed by: OTOLARYNGOLOGY

## 2020-09-30 LAB
CYTO UR: NORMAL
LAB AP CASE REPORT: NORMAL
LAB AP CLINICAL INFORMATION: NORMAL
LAB AP DIAGNOSIS COMMENT: NORMAL
Lab: NORMAL
PATH REPORT.FINAL DX SPEC: NORMAL
PATH REPORT.GROSS SPEC: NORMAL

## 2020-10-13 ENCOUNTER — OFFICE VISIT (OUTPATIENT)
Dept: OTOLARYNGOLOGY | Facility: CLINIC | Age: 40
End: 2020-10-13

## 2020-10-13 VITALS
BODY MASS INDEX: 33.49 KG/M2 | HEART RATE: 90 BPM | SYSTOLIC BLOOD PRESSURE: 142 MMHG | DIASTOLIC BLOOD PRESSURE: 93 MMHG | TEMPERATURE: 98.2 F | WEIGHT: 189 LBS | HEIGHT: 63 IN

## 2020-10-13 DIAGNOSIS — J31.0 RHINITIS, CHRONIC: ICD-10-CM

## 2020-10-13 DIAGNOSIS — E04.2 MULTIPLE THYROID NODULES: Primary | ICD-10-CM

## 2020-10-13 DIAGNOSIS — E07.89 THYROID MASS OF UNCLEAR ETIOLOGY: ICD-10-CM

## 2020-10-13 DIAGNOSIS — J34.2 ACQUIRED DEVIATED NASAL SEPTUM: ICD-10-CM

## 2020-10-13 PROCEDURE — 99213 OFFICE O/P EST LOW 20 MIN: CPT | Performed by: OTOLARYNGOLOGY

## 2020-10-13 RX ORDER — TRIAMCINOLONE ACETONIDE 55 UG/1
2 SPRAY, METERED NASAL 2 TIMES DAILY
Qty: 33.8 ML | Refills: 6 | Status: SHIPPED | OUTPATIENT
Start: 2020-10-13 | End: 2020-12-17

## 2020-10-13 NOTE — PATIENT INSTRUCTIONS
NASAL SALINE:  Use 2 puffs each nostril 4-6 times daily and more frequently if possible.  You can buy saline spray or you can make your own and use an old spray bottle to administer  Use a humidifier at bedside  Recipe for saline:  Water                                 1 quart  Salt (table)                        1 tablespoon  Gylcerin (or Daniella Syrup)    1 teaspoon  Sodium bicarbonate           1 teaspoon  Sprays or Angelica pots are recommended    Nasal steroid use:  Using nasal steroids:  You will be prescribed one of the following nasal steroids: Flonase, Nasacort, Nasonex, Rhinocort, Qnasl, Zetonna  2 puffs each nostril 2 times daily  Start as soon as possible  If you are using Afrin for 3 days with the nasal steroid,  Use Afrin first and wait 10 minutes to allow the nose to open. Then administer nasal steroids.    Thyroid Ultrasound by Dr Cuevas one year with thyroid hormone.     [email or web address]

## 2020-10-13 NOTE — PROGRESS NOTES
Rashaun Sousa Jr, MD     ENT FOLLOW UP NOTE     Chief Complaint   Patient presents with   • Follow-up     thyroid biopsy        HISTORY OF PRESENT ILLNESS:  Accompanied by:  No one  Mandy Barrera is a  39 y.o. female who is here for follow up. She has had thyroid biopsy.  She is here for results and re-evaluation.    Review of Systems   Constitutional: Negative for appetite change, fatigue and fever.   HENT:        See HPI   Respiratory: Negative for cough, choking and shortness of breath.    Gastrointestinal: Negative for diarrhea, nausea and vomiting.   Skin: Negative for rash.   Neurological: Negative for dizziness, light-headedness and headaches.   Psychiatric/Behavioral: Negative for sleep disturbance.       Past History:  Past medical and surgical history, family history and social history reviewed and updated when appropriate.  Current medications and allergies reviewed and updated when appropriate.  Allergies:  Infed [iron dextran], Hydromorphone hcl, Wilfredo butter, Ampicillin, Lamotrigine, Latex, Lortab [hydrocodone-acetaminophen], Morphine, Nitrofurantoin, Nuts, and Poison ivy extract        Vital Signs:   Temp:  [98.2 °F (36.8 °C)] 98.2 °F (36.8 °C)  Heart Rate:  [90] 90  BP: (142)/(93) 142/93  EXAMINATION:  CONSTITUTIONAL:    well nourished, well-developed, alert, oriented, in no acute distress      BODY HABITUS:    Normal body habitus     COMMUNICATION:    able to communicate normally, normal voice quality     HEAD:     Normocephalic, without obvious abnormality, atraumatic     FACE:    structure normal, no tenderness present, no lesions/masses, no evidence of trauma     SALIVARY GLANDS:    parotid glands with no tenderness, no swelling, no masses, submandibular glands with normal size, nontender      EYE:    ocular motility normal, eyelids normal, orbits normal, no proptosis, conjunctiva clear, sclera non-icteric, pupils equal, round, reactive to light and accomodation  Color:    brown     HEARING:    response to conversational voice normal     EARS:    Otoscopic exam   normal pinna with no lesions, Canals normal size and shape, Tympanic membranes normal, Ossicular chain intact, Middle ear clear     NOSE EXTERNAL:    APPEARANCE: normal, straight, with good projection, no tenderness, no lesions, no tenderness, good nasal support, patent nares     NOSE INTERNAL:    Anterior rhinoscopy   NASALMUCOSA:    intact mucosa, no lesions    NASAL PASSAGES:     abnormal   Bilateral- partially obstructed by septum and nasal sill    NASAL VALVE:     intact, good support and no nasal obstruction   SEPTUM:     normal mucosa without crusting or lesions  Bilateral    deviation present:      deviated Right very low and along nasal sill apart from the septum     deviated Left low, jessie mild nasal sill elevation    Nasal floor is elevated R>L causing some obstruction   INFERIOR TURBINATES:     normal size, non-obstructing, no lesions     ORAL CAVITY:    Normal lips with no lesions, dentition normal for age, FOM intact without lesions and normal salivary flow, Mucosa intact without lesions, Hard and soft palate normal without lesions  TONGUE:       hypertrophy  Moderate, prolapse   Moderate     OROPHARYNX:    Direct examination  oropharyngeal mucosa normal, tonsil(s) with normal appearance       NECK:    short, thick  mild     LYMPH NODES :    no adenopathy     THYROID:    diffuse thyroid present, no nodules palpable      CHEST/RESPIRATORY:    respiratory effort normal, no rales, rubs or wheezing, no stridor, normal appearance to chest     CARDIOVASCULAR:    regular rate and rhythm, no murmurs, gallups, no peripheral edema     NEURO/PSYCHIATRIC :    oriented appropriately for age, mood normal, affect appropriate, cranial nerves intact grossly (unless specifically described), gait normal for age    RESULTS REVIEW:    I have reviewed the patients old records in the chart.  I reviewed the patient's new clinical  results.  Thyroid ultrasound showed Nodule and Bx results indicate Follicular lesion.  Lab Results   Component Value Date    FINALDX  2020     Right thyroid nodule, fine-needle aspiration, smears (2) and ThinPrep preparation (1): Consistent with a benign follicular nodule (Bloomery category II).                ASSESSMENT:  {Prob List  Visit Dx  Meds  SmartSets  Prep for Surgery  BestPractice :23}    Diagnosis Plan   1. Multiple thyroid nodules      Follicular lesion- Bloomery 2   2. Thyroid mass of unclear etiology      Follicular by biopsy   3. Acquired deviated nasal septum     4. Rhinitis, chronic      Parital control           PLAN:      Medical and surgical options were discussed including observation, continued medical management and surgical management. Risks, benefits and alternatives were discussed and questions were answered. After considering the options, the patient decided to proceed with observation.  Patient has Nodule that requires yearly follow up. She wishes to be followed by PCP for thyroid nodule management.  I recommend Thyroid U/S one year with TFTs.  Nose- I recommend Nasacort for nose right now. She will follow up with with me if Nasacort not control breathing symptoms. She appears to have Chronic rhinitis with edema.  Thyroid U/S one year  Nasacort BID  Follow up with PCP for U/S  Follow up ENT PRN- per patient wishes  MY CHART:  Patient is Patient is using My Chart  New Medications Ordered This Visit   Medications   • Triamcinolone Acetonide (NASACORT) 55 MCG/ACT nasal inhaler     Si sprays into the nostril(s) as directed by provider 2 (Two) Times a Day.     Dispense:  33.8 mL     Refill:  6          Patient understand(s) and agree(s) with the treatment plan as described.    Return if symptoms worsen or fail to improve, for Recheck thryoid and nose.     Rashaun Sousa Jr, MD  10/13/20  09:52 CDT

## 2020-12-17 ENCOUNTER — OFFICE VISIT (OUTPATIENT)
Dept: NEUROLOGY | Facility: CLINIC | Age: 40
End: 2020-12-17

## 2020-12-17 VITALS
DIASTOLIC BLOOD PRESSURE: 80 MMHG | BODY MASS INDEX: 34.52 KG/M2 | SYSTOLIC BLOOD PRESSURE: 122 MMHG | HEART RATE: 90 BPM | OXYGEN SATURATION: 98 % | WEIGHT: 194.8 LBS | HEIGHT: 63 IN

## 2020-12-17 DIAGNOSIS — G43.709 CHRONIC MIGRAINE WITHOUT AURA WITHOUT STATUS MIGRAINOSUS, NOT INTRACTABLE: Primary | ICD-10-CM

## 2020-12-17 DIAGNOSIS — G40.909 SEIZURE DISORDER (HCC): ICD-10-CM

## 2020-12-17 DIAGNOSIS — G47.26 SHIFT WORK SLEEP DISORDER: ICD-10-CM

## 2020-12-17 PROCEDURE — 99213 OFFICE O/P EST LOW 20 MIN: CPT | Performed by: PHYSICIAN ASSISTANT

## 2020-12-17 NOTE — PROGRESS NOTES
Neurology Progress Note      Chief Complaint:    Chronic headache disorder  Seizure disorder  History of right occipital AVM clipping/craniotomy  Impaired sleep  Anxiety/depression    Subjective     Subjective:  This is a 39-year-old right-hand-dominant female routinely cared for by James Cuevas MD, who has a history of remote right occipital craniotomy secondary to AVM clipping.  Patient also has a history of seizure disorder and has historically been non-compliant with antiepileptic medications.  She has been on Vimpat and Trokendi in the past.  She is on no antiepileptic medication at present.    At the previous encounter, we discussed numerous concerns and had requested updated MRI of the brain with and without contrast and EEG.  Neither of these were completed as the patient did not follow through on scheduling these.    Past Medical History:   Diagnosis Date   • Cerebral aneurysm    • GERD (gastroesophageal reflux disease)    • H/O: hysterectomy    • Hypertension    • Iron deficiency anemia    • Seizure (CMS/HCC)      Past Surgical History:   Procedure Laterality Date   • APPENDECTOMY     •  SECTION      x2   • CHOLECYSTECTOMY     • COLONOSCOPY     • CRANIOTOMY     • ENDOMETRIAL ABLATION     • HYSTERECTOMY       Family History   Problem Relation Age of Onset   • Rheum arthritis Mother    • No Known Problems Brother    • No Known Problems Sister    • No Known Problems Daughter    • No Known Problems Brother    • No Known Problems Brother    • No Known Problems Brother    • No Known Problems Daughter      Social History     Tobacco Use   • Smoking status: Never Smoker   • Smokeless tobacco: Never Used   Substance Use Topics   • Alcohol use: No   • Drug use: No       Medications:  Current Outpatient Medications   Medication Sig Dispense Refill   • azithromycin (ZITHROMAX) 250 MG tablet Take 2 tablets the first day, then 1 tablet daily for 4 days. 6 tablet 0   • cyclobenzaprine (FLEXERIL) 10 MG tablet  Take 1 tablet by mouth 3 (Three) Times a Day As Needed for Muscle Spasms. 15 tablet 0   • escitalopram (LEXAPRO) 10 MG tablet Take 1 tablet by mouth daily 30 tablet 5   • fluticasone (FLONASE) 50 MCG/ACT nasal spray Use 2 sprays into the nostril as directed by provider Daily. 16 g 0   • galcanezumab-gnlm (EMGALITY) 120 MG/ML prefilled syringe Inject 1 mL under the skin into the appropriate area as directed Every 30 (Thirty) Days. 1 mL 2   • meloxicam (MOBIC) 15 MG tablet Take 1 tablet by mouth daily 30 tablet 2   • Triamcinolone Acetonide (NASACORT) 55 MCG/ACT nasal inhaler 2 sprays into the nostril(s) as directed by provider 2 (Two) Times a Day. 33.8 mL 6   • verapamil ER (VERELAN) 120 MG 24 hr capsule Take 1 capsule by mouth daily 30 capsule 5   • zolpidem CR (AMBIEN CR) 6.25 MG CR tablet Take 1 tablet by mouth as needed at bedtime. 30 tablet 2     No current facility-administered medications for this visit.        Allergies:    Infed [iron dextran], Hydromorphone hcl, Wilfredo butter, Ampicillin, Lamotrigine, Latex, Lortab [hydrocodone-acetaminophen], Morphine, Nitrofurantoin, Nuts, and Poison ivy extract    Review of Systems:   -A 14 point review of systems is completed and is negative.      Objective      Vital Signs       Physical Exam:    General Exam:  Head:  Normocephalic, atraumatic.  HEENT: PERRLA.  Full EOM.  Neck:  No lymphadenopathy, thyromegaly or bruit.  Cardiac:  Regular rate and rhythm.  Normal S1, S2.  No murmur, rub or gallop.  Lungs:  Clear to auscultation bilaterally.  No wheeze, rales or rhonchi.  Abdomen:  Non-tender, Non-distended.  Bowel sounds normoactive.  Extremities: Full peripheral pulses.  No clubbing, cyanosis or edema.  Skin: No ulceration, breakdown or rash.      Neurologic Exam:  Mental Status:    -Awake. Alert. Oriented to person, place & time.  -No word finding difficulties.  -No aphasia.  -No dysarthria.  -Follows simple commands.     CN II:  Full visual fields with confrontation.   Pupils equally reactive to light.  CN III, IV, VI:  Extraocular muscles function intact with no nystagmus.  CN V:  Facial sensory is symmetric.  CN VII:  Facial motor symmetric.  CN VIII:  Gross hearing intact bilaterally.  CN IX/X:  Palate elevates symmetrically.  CN XI:  Shoulder shrug symmetric.  CN XII:  Tongue is midline on protrusion.     Motor: (strength out of 5:  1= minimal movement, 2 = movement in plane of gravity, 3 = movement against gravity, 4 = movement against some resistance, 5 = full strength)     -5/5 in bilateral biceps, triceps, brachioradialis, wrist extensors and intrinsic muscles of the hand.    -5/5 in bilateral hip flexors, quadriceps, hamstrings, gastrocsoleus complex, anterior tibialis and extensor hallucis longus.       Deep Tendon Reflexes:  -Right              Biceps: 2+         Triceps: 2+      Brachioradialis: 2+              Patella: 2+       Ankle: 2+           -Left              Biceps: 2+         Triceps: 2+      Brachioradialis: 2+              Patella: 2+       Ankle: 2+             Tone (Modified Charlene Scale):  No appreciable increase in tone or rigidity noted.     Sensory:  -Intact to light touch, pinprick BUE (C5-T1) and BLE (L2-S1).     Coordination:  -Finger to nose intact BUEs  -Heel to shin intact BLEs  -No ataxia     Gait  -No signs of ataxia  -ambulates unassisted       Results Review:    I reviewed the patient's new clinical results and findings.      No components found for: A1C  Lab Results   Component Value Date    HDL 52 08/18/2020     (H) 08/18/2020     No components found for: B12  Lab Results   Component Value Date    TSH 3.550 04/30/2020       Assessment/Plan     Impression:  1.  Seizure disorder  2.  Medical noncompliance  3.  Shiftwork sleep disorder  4.  Chronic pain/cervical myofascial pain  5.  Chronic (migraine) headache disorder  6.  Anxiety & depression      Plan:  1.  Continue Emgality 120 mg subcutaneous monthly for migraine  prophylaxis    2.  Continue Ubrelvy 100 mg as an abortive agent.  Co-pay card write it for her as well.    3.  Patient had been on topiramate in the past, however, did not take this correctly due to an error in prescribing transcription, however, she has been doing well since off of this and has been seizure-free.  She is not interested in taking additional antiseizure medication.    4.  I still recommend against use of long-term Ambien.  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    Patient will follow up with me in 6 months if she is doing well and stable at that point, we will likely begin to see as needed, thereafter.          Paramjit Nielsen PA-C  12/17/20  10:42 CST

## 2021-01-30 ENCOUNTER — IMMUNIZATION (OUTPATIENT)
Dept: VACCINE CLINIC | Facility: HOSPITAL | Age: 41
End: 2021-01-30

## 2021-01-30 PROCEDURE — 91301 HC SARSCO02 VAC 100MCG/0.5ML IM: CPT | Performed by: OBSTETRICS & GYNECOLOGY

## 2021-01-30 PROCEDURE — 0011A: CPT | Performed by: OBSTETRICS & GYNECOLOGY

## 2021-02-03 ENCOUNTER — TELEPHONE (OUTPATIENT)
Dept: NEUROLOGY | Facility: CLINIC | Age: 41
End: 2021-02-03

## 2021-02-03 NOTE — TELEPHONE ENCOUNTER
"Caller: EHSAN    Relationship: SELF    Best call back number: [phone]    What form or medical record are you requesting: SZ DRIVERS FORM     Who is requesting this form or medical record from you: ENRIKE     How would you like to receive the form or medical records (pick-up, mail, fax):    Timeframe paperwork needed: AS SOON AS POSSIBLE    Additional notes: PT STATES WHEN GOING TO RENEW HER DL , SHE ANSWERED A QUESTION ABOUT SZ, SHE STATES SHE DOES NOT BELIEVE IT WAS A \"SZ\" SHE WAS LAYING IN BED AND HER EYES STARTED ROLLING BACK AND EYES WAS JERKING BACK AND FORTH, BUT NO LIMBS WERE JERKING. SHE WAS HONEST BUT WAS WONDERING IF SOMETHING COULD BE DONE ? PLEASE ADVISE.           "

## 2021-02-05 ENCOUNTER — TELEPHONE (OUTPATIENT)
Dept: NEUROLOGY | Facility: CLINIC | Age: 41
End: 2021-02-05

## 2021-03-01 ENCOUNTER — IMMUNIZATION (OUTPATIENT)
Dept: VACCINE CLINIC | Facility: HOSPITAL | Age: 41
End: 2021-03-01

## 2021-03-01 PROCEDURE — 91301 HC SARSCO02 VAC 100MCG/0.5ML IM: CPT | Performed by: OBSTETRICS & GYNECOLOGY

## 2021-03-01 PROCEDURE — 0012A: CPT | Performed by: OBSTETRICS & GYNECOLOGY

## 2021-05-27 ENCOUNTER — TRANSCRIBE ORDERS (OUTPATIENT)
Dept: ADMINISTRATIVE | Facility: HOSPITAL | Age: 41
End: 2021-05-27

## 2021-05-27 DIAGNOSIS — N64.4 MASTODYNIA: Primary | ICD-10-CM

## 2021-06-03 ENCOUNTER — HOSPITAL ENCOUNTER (OUTPATIENT)
Dept: ULTRASOUND IMAGING | Facility: HOSPITAL | Age: 41
Discharge: HOME | End: 2021-06-03

## 2021-06-03 ENCOUNTER — HOSPITAL ENCOUNTER (OUTPATIENT)
Dept: MAMMOGRAPHY | Facility: HOSPITAL | Age: 41
Discharge: HOME | End: 2021-06-03

## 2021-06-03 DIAGNOSIS — N64.4 MASTODYNIA: ICD-10-CM

## 2021-06-03 PROCEDURE — G0279 TOMOSYNTHESIS, MAMMO: HCPCS

## 2021-06-03 PROCEDURE — 77066 DX MAMMO INCL CAD BI: CPT

## 2021-06-03 PROCEDURE — 76642 ULTRASOUND BREAST LIMITED: CPT

## 2021-06-18 ENCOUNTER — OFFICE VISIT (OUTPATIENT)
Dept: NEUROLOGY | Facility: CLINIC | Age: 41
End: 2021-06-18

## 2021-06-18 VITALS
BODY MASS INDEX: 34.38 KG/M2 | WEIGHT: 194 LBS | DIASTOLIC BLOOD PRESSURE: 84 MMHG | OXYGEN SATURATION: 98 % | SYSTOLIC BLOOD PRESSURE: 130 MMHG | HEIGHT: 63 IN | HEART RATE: 84 BPM

## 2021-06-18 DIAGNOSIS — G40.909 SEIZURE DISORDER (HCC): ICD-10-CM

## 2021-06-18 DIAGNOSIS — B97.89 VIRAL RESPIRATORY ILLNESS: ICD-10-CM

## 2021-06-18 DIAGNOSIS — J98.8 VIRAL RESPIRATORY ILLNESS: ICD-10-CM

## 2021-06-18 DIAGNOSIS — J30.1 SEASONAL ALLERGIC RHINITIS DUE TO POLLEN: ICD-10-CM

## 2021-06-18 DIAGNOSIS — H65.92 MIDDLE EAR EFFUSION, LEFT: ICD-10-CM

## 2021-06-18 DIAGNOSIS — G43.709 CHRONIC MIGRAINE WITHOUT AURA WITHOUT STATUS MIGRAINOSUS, NOT INTRACTABLE: Primary | ICD-10-CM

## 2021-06-18 PROCEDURE — 99213 OFFICE O/P EST LOW 20 MIN: CPT | Performed by: PHYSICIAN ASSISTANT

## 2021-06-18 RX ORDER — FLUTICASONE PROPIONATE 50 MCG
2 SPRAY, SUSPENSION (ML) NASAL DAILY
Qty: 16 G | Refills: 5 | Status: SHIPPED | OUTPATIENT
Start: 2021-06-18

## 2021-06-18 NOTE — PROGRESS NOTES
Neurology Progress Note      Chief Complaint:    Migraine headache disorder    Subjective     Subjective:  Patient returns clinic today for follow-up evaluation of migraine.  The patient has been doing well.  She has really experienced an excellent relief of symptoms with Emgality and Ubrelvy.  She still gets migraine occasionally during the month, but Ubrelvy successfully aborts this with in only a matter of minutes.  She is very pleased with her outcomes.  She would like a refill of Flonase nasal spray today.      Past Medical History:   Diagnosis Date   • Cerebral aneurysm    • GERD (gastroesophageal reflux disease)    • H/O: hysterectomy    • Hypertension    • Iron deficiency anemia    • Seizure (CMS/HCC)      Past Surgical History:   Procedure Laterality Date   • APPENDECTOMY     •  SECTION      x2   • CHOLECYSTECTOMY     • COLONOSCOPY     • CRANIOTOMY     • ENDOMETRIAL ABLATION     • HYSTERECTOMY       Family History   Problem Relation Age of Onset   • Rheum arthritis Mother    • No Known Problems Brother    • No Known Problems Sister    • No Known Problems Daughter    • No Known Problems Brother    • No Known Problems Brother    • No Known Problems Brother    • No Known Problems Daughter    • Breast cancer Maternal Great-Grandmother      Social History     Tobacco Use   • Smoking status: Never Smoker   • Smokeless tobacco: Never Used   Substance Use Topics   • Alcohol use: No   • Drug use: No       Medications:  Current Outpatient Medications   Medication Sig Dispense Refill   • escitalopram (LEXAPRO) 20 MG tablet Take 1 tablet by mouth every day. 30 tablet 5   • fluticasone (FLONASE) 50 MCG/ACT nasal spray Use 2 sprays into the nostril as directed by provider Daily. 16 g 0   • galcanezumab-gnlm (EMGALITY) 120 MG/ML prefilled syringe Inject 1 mL under the skin into the appropriate area as directed Every 30 (Thirty) Days. 1 mL 11   • meloxicam (MOBIC) 15 MG tablet Take 1 tablet by mouth daily 30  tablet 2   • traZODone (DESYREL) 50 MG tablet Take 1/2 tablet by mouth every day prior to going to bed.  If after 1 week you still have difficulty sleeping, increase it to 1 tablet daily. 30 tablet 2   • triamcinolone (KENALOG) 0.1 % ointment Apply a thin layer to the affected area(s) topically twice a day for up to 2 weeks 30 g 0   • triamterene-hydrochlorothiazide (MAXZIDE-25) 37.5-25 MG per tablet Take 1/2 tablet by mouth once daily as needed for elevated blood pressure or swelling 30 tablet 1   • ubrogepant (ubrogepant) 100 MG tablet Take 1 tablet by mouth at onset of migraine may repeat in 2 hours if needed max 2 tablets in 24 hours 30 tablet 11   • galcanezumab-gnlm (EMGALITY) 120 MG/ML prefilled syringe Inject 2 mL under the skin into the appropriate area as directed 1 (One) Time for 1 dose. 1.12 mL 0   • Ubrogepant (ubrogepant) 50 MG tablet Take 1 tablet by mouth daily as needed 10 tablet 5     No current facility-administered medications for this visit.       Allergies:    Infed [iron dextran], Hydromorphone hcl, Wilfredo butter, Ampicillin, Lamotrigine, Latex, Lortab [hydrocodone-acetaminophen], Morphine, Nitrofurantoin, Nuts, and Poison ivy extract    Review of Systems:   -A 14 point review of systems is completed and is negative.      Objective      Vital Signs  Heart Rate:  [84] 84  BP: (130)/(84) 130/84    Physical Exam:    General Exam:  Head:  Normocephalic, atraumatic.  HEENT: PERRLA.  Full EOM.  Neck:  No lymphadenopathy, thyromegaly or bruit.  Cardiac:  Regular rate and rhythm.  Normal S1, S2.  No murmur, rub or gallop.  Lungs:  Clear to auscultation bilaterally.  No wheeze, rales or rhonchi.  Abdomen:  Non-tender, Non-distended.  Bowel sounds normoactive.  Extremities: Full peripheral pulses.  No clubbing, cyanosis or edema.  Skin: No ulceration, breakdown or rash.      Neurologic Exam:  CERVICAL SPINE EXAMINATION:  RANGE OF MOTION: The patient is able to flex, extend, rotate, and side bend without  pain or difficulty.  There is full range of motion.    PALPATION: Nontender to palpation midline and through upper cervical musculature.  STRENGTH: 5/5 bilateral trapezius, deltoid, triceps, biceps, wrist extensors/flexors, finger opposition.  SENSATION: Light touch and pinprick intact C5-T1 bilaterally.  REFLEXES: DTRs are 2+ bilaterally at biceps, triceps, and brachioradialis.  Poole's and palmomental are negative bilaterally.  SPECIAL TESTS:  Tinel's & Phalen's are negative. Spurling's is negative bilaterally.     Coordination:  -Finger to nose intact BUEs  -Heel to shin intact BLEs  -No ataxia     Gait  -No signs of ataxia  -ambulates unassisted       Results Review:        Assessment/Plan     Impression:  1.  Migraine headache disorder  2.  Allergic rhinitis      Plan:  1.  Continue Emgality 120 mg subcutaneous monthly.    2.  Continue Ubrelvy 100 milligrams as an abortive agent.    3.  Recommend regular cardiovascular exercise.    4.  Maintain adequate hydration    5.  Refill of Flonase nasal spray.    6.  Follow-up with me on an annual basis.  She will call for any concerns or questions in the interim.          Paramjit Nielsen PA-C  06/18/21  08:26 CDT

## 2021-07-28 ENCOUNTER — HOSPITAL ENCOUNTER (OUTPATIENT)
Facility: HOSPITAL | Age: 41
Setting detail: OBSERVATION
Discharge: HOME | End: 2021-07-30
Attending: INTERNAL MEDICINE | Admitting: FAMILY MEDICINE

## 2021-07-28 ENCOUNTER — TRANSCRIBE ORDERS (OUTPATIENT)
Dept: LAB | Facility: HOSPITAL | Age: 41
End: 2021-07-28

## 2021-07-28 ENCOUNTER — PATIENT MESSAGE (OUTPATIENT)
Dept: NEUROLOGY | Facility: CLINIC | Age: 41
End: 2021-07-28

## 2021-07-28 ENCOUNTER — APPOINTMENT (OUTPATIENT)
Dept: CT IMAGING | Facility: HOSPITAL | Age: 41
End: 2021-07-28

## 2021-07-28 ENCOUNTER — LAB (OUTPATIENT)
Dept: LAB | Facility: HOSPITAL | Age: 41
End: 2021-07-28

## 2021-07-28 DIAGNOSIS — R56.9 SEIZURE (HCC): Primary | ICD-10-CM

## 2021-07-28 DIAGNOSIS — Z01.818 PREOPERATIVE TESTING: Primary | ICD-10-CM

## 2021-07-28 DIAGNOSIS — Z01.818 PREOPERATIVE TESTING: ICD-10-CM

## 2021-07-28 DIAGNOSIS — N39.0 URINARY TRACT INFECTION WITH HEMATURIA, SITE UNSPECIFIED: ICD-10-CM

## 2021-07-28 DIAGNOSIS — R31.9 URINARY TRACT INFECTION WITH HEMATURIA, SITE UNSPECIFIED: ICD-10-CM

## 2021-07-28 LAB
ALBUMIN SERPL-MCNC: 4.4 G/DL (ref 3.5–5.2)
ALBUMIN/GLOB SERPL: 1.6 G/DL
ALP SERPL-CCNC: 80 U/L (ref 39–117)
ALT SERPL W P-5'-P-CCNC: 13 U/L (ref 1–33)
AMPHET+METHAMPHET UR QL: NEGATIVE
AMPHETAMINES UR QL: NEGATIVE
ANION GAP SERPL CALCULATED.3IONS-SCNC: 12 MMOL/L (ref 5–15)
AST SERPL-CCNC: 15 U/L (ref 1–32)
BACTERIA UR QL AUTO: ABNORMAL /HPF
BARBITURATES UR QL SCN: NEGATIVE
BASOPHILS # BLD AUTO: 0.05 10*3/MM3 (ref 0–0.2)
BASOPHILS NFR BLD AUTO: 0.5 % (ref 0–1.5)
BENZODIAZ UR QL SCN: NEGATIVE
BILIRUB SERPL-MCNC: 0.3 MG/DL (ref 0–1.2)
BILIRUB UR QL STRIP: NEGATIVE
BUN SERPL-MCNC: 13 MG/DL (ref 6–20)
BUN/CREAT SERPL: 17.6 (ref 7–25)
BUPRENORPHINE SERPL-MCNC: NEGATIVE NG/ML
CALCIUM SPEC-SCNC: 9.5 MG/DL (ref 8.6–10.5)
CANNABINOIDS SERPL QL: POSITIVE
CHLORIDE SERPL-SCNC: 103 MMOL/L (ref 98–107)
CLARITY UR: ABNORMAL
CO2 SERPL-SCNC: 24 MMOL/L (ref 22–29)
COCAINE UR QL: NEGATIVE
COLOR UR: YELLOW
CREAT SERPL-MCNC: 0.74 MG/DL (ref 0.57–1)
DEPRECATED RDW RBC AUTO: 47.1 FL (ref 37–54)
EOSINOPHIL # BLD AUTO: 0.51 10*3/MM3 (ref 0–0.4)
EOSINOPHIL NFR BLD AUTO: 4.9 % (ref 0.3–6.2)
ERYTHROCYTE [DISTWIDTH] IN BLOOD BY AUTOMATED COUNT: 15.4 % (ref 12.3–15.4)
GFR SERPL CREATININE-BSD FRML MDRD: 105 ML/MIN/1.73
GLOBULIN UR ELPH-MCNC: 2.7 GM/DL
GLUCOSE SERPL-MCNC: 101 MG/DL (ref 65–99)
GLUCOSE UR STRIP-MCNC: NEGATIVE MG/DL
HCT VFR BLD AUTO: 37.6 % (ref 34–46.6)
HGB BLD-MCNC: 12.4 G/DL (ref 12–15.9)
HGB UR QL STRIP.AUTO: ABNORMAL
HYALINE CASTS UR QL AUTO: ABNORMAL /LPF
IMM GRANULOCYTES # BLD AUTO: 0.03 10*3/MM3 (ref 0–0.05)
IMM GRANULOCYTES NFR BLD AUTO: 0.3 % (ref 0–0.5)
KETONES UR QL STRIP: NEGATIVE
LEUKOCYTE ESTERASE UR QL STRIP.AUTO: ABNORMAL
LYMPHOCYTES # BLD AUTO: 3.02 10*3/MM3 (ref 0.7–3.1)
LYMPHOCYTES NFR BLD AUTO: 29.3 % (ref 19.6–45.3)
MAGNESIUM SERPL-MCNC: 1.9 MG/DL (ref 1.6–2.6)
MCH RBC QN AUTO: 27.7 PG (ref 26.6–33)
MCHC RBC AUTO-ENTMCNC: 33 G/DL (ref 31.5–35.7)
MCV RBC AUTO: 83.9 FL (ref 79–97)
METHADONE UR QL SCN: NEGATIVE
MONOCYTES # BLD AUTO: 0.71 10*3/MM3 (ref 0.1–0.9)
MONOCYTES NFR BLD AUTO: 6.9 % (ref 5–12)
NEUTROPHILS NFR BLD AUTO: 5.99 10*3/MM3 (ref 1.7–7)
NEUTROPHILS NFR BLD AUTO: 58.1 % (ref 42.7–76)
NITRITE UR QL STRIP: POSITIVE
NRBC BLD AUTO-RTO: 0 /100 WBC (ref 0–0.2)
OPIATES UR QL: NEGATIVE
OXYCODONE UR QL SCN: NEGATIVE
PCP UR QL SCN: NEGATIVE
PH UR STRIP.AUTO: 7 [PH] (ref 5–8)
PLATELET # BLD AUTO: 255 10*3/MM3 (ref 140–450)
PMV BLD AUTO: 10.9 FL (ref 6–12)
POTASSIUM SERPL-SCNC: 3.3 MMOL/L (ref 3.5–5.2)
PROPOXYPH UR QL: NEGATIVE
PROT SERPL-MCNC: 7.1 G/DL (ref 6–8.5)
PROT UR QL STRIP: ABNORMAL
RBC # BLD AUTO: 4.48 10*6/MM3 (ref 3.77–5.28)
RBC # UR: ABNORMAL /HPF
REF LAB TEST METHOD: ABNORMAL
SARS-COV-2 RNA RESP QL NAA+PROBE: NOT DETECTED
SODIUM SERPL-SCNC: 139 MMOL/L (ref 136–145)
SP GR UR STRIP: 1.02 (ref 1–1.03)
SQUAMOUS #/AREA URNS HPF: ABNORMAL /HPF
TRICYCLICS UR QL SCN: NEGATIVE
TROPONIN T SERPL-MCNC: <0.01 NG/ML (ref 0–0.03)
UROBILINOGEN UR QL STRIP: ABNORMAL
WBC # BLD AUTO: 10.31 10*3/MM3 (ref 3.4–10.8)
WBC UR QL AUTO: ABNORMAL /HPF

## 2021-07-28 PROCEDURE — 87086 URINE CULTURE/COLONY COUNT: CPT | Performed by: NURSE PRACTITIONER

## 2021-07-28 PROCEDURE — G0378 HOSPITAL OBSERVATION PER HR: HCPCS

## 2021-07-28 PROCEDURE — 93010 ELECTROCARDIOGRAM REPORT: CPT | Performed by: INTERNAL MEDICINE

## 2021-07-28 PROCEDURE — C9803 HOPD COVID-19 SPEC COLLECT: HCPCS

## 2021-07-28 PROCEDURE — 84484 ASSAY OF TROPONIN QUANT: CPT | Performed by: NURSE PRACTITIONER

## 2021-07-28 PROCEDURE — U0003 INFECTIOUS AGENT DETECTION BY NUCLEIC ACID (DNA OR RNA); SEVERE ACUTE RESPIRATORY SYNDROME CORONAVIRUS 2 (SARS-COV-2) (CORONAVIRUS DISEASE [COVID-19]), AMPLIFIED PROBE TECHNIQUE, MAKING USE OF HIGH THROUGHPUT TECHNOLOGIES AS DESCRIBED BY CMS-2020-01-R: HCPCS

## 2021-07-28 PROCEDURE — 25010000002 CEFTRIAXONE PER 250 MG: Performed by: NURSE PRACTITIONER

## 2021-07-28 PROCEDURE — 93005 ELECTROCARDIOGRAM TRACING: CPT | Performed by: NURSE PRACTITIONER

## 2021-07-28 PROCEDURE — 81001 URINALYSIS AUTO W/SCOPE: CPT | Performed by: NURSE PRACTITIONER

## 2021-07-28 PROCEDURE — 85025 COMPLETE CBC W/AUTO DIFF WBC: CPT | Performed by: NURSE PRACTITIONER

## 2021-07-28 PROCEDURE — 80306 DRUG TEST PRSMV INSTRMNT: CPT | Performed by: NURSE PRACTITIONER

## 2021-07-28 PROCEDURE — 70450 CT HEAD/BRAIN W/O DYE: CPT

## 2021-07-28 PROCEDURE — 87186 SC STD MICRODIL/AGAR DIL: CPT | Performed by: NURSE PRACTITIONER

## 2021-07-28 PROCEDURE — 83735 ASSAY OF MAGNESIUM: CPT | Performed by: NURSE PRACTITIONER

## 2021-07-28 PROCEDURE — 87077 CULTURE AEROBIC IDENTIFY: CPT | Performed by: NURSE PRACTITIONER

## 2021-07-28 PROCEDURE — 99284 EMERGENCY DEPT VISIT MOD MDM: CPT

## 2021-07-28 PROCEDURE — 80053 COMPREHEN METABOLIC PANEL: CPT | Performed by: NURSE PRACTITIONER

## 2021-07-28 RX ORDER — ACETAMINOPHEN 325 MG/1
650 TABLET ORAL EVERY 4 HOURS PRN
Status: DISCONTINUED | OUTPATIENT
Start: 2021-07-28 | End: 2021-07-30 | Stop reason: HOSPADM

## 2021-07-28 RX ADMIN — CEFTRIAXONE SODIUM 1 G: 1 INJECTION, POWDER, FOR SOLUTION INTRAMUSCULAR; INTRAVENOUS at 18:18

## 2021-07-28 NOTE — TELEPHONE ENCOUNTER
Mandy said her boyfriend told her she had several seizures while she was sleeping one day last week and Monday.  The last seizure was about 3 months ago, before that it had been some time since she had a seizure.  She has been treated for seizures in the past, but isn't on any medications currently.  She has taken Topamax and Keppra, she is allergic to Lamictal.  Her boyfriend told her she was jerking and shaking, he didn't tell her how long the seizure lasted.  She wasn't incontinent and didn't bite her tongue.  Paramjit recommends she go to ER.  Message left requesting a return call.

## 2021-07-29 ENCOUNTER — APPOINTMENT (OUTPATIENT)
Dept: MRI IMAGING | Facility: HOSPITAL | Age: 41
End: 2021-07-29

## 2021-07-29 ENCOUNTER — APPOINTMENT (OUTPATIENT)
Dept: NEUROLOGY | Facility: HOSPITAL | Age: 41
End: 2021-07-29

## 2021-07-29 LAB
ANION GAP SERPL CALCULATED.3IONS-SCNC: 11 MMOL/L (ref 5–15)
BUN SERPL-MCNC: 13 MG/DL (ref 6–20)
BUN/CREAT SERPL: 17.1 (ref 7–25)
CALCIUM SPEC-SCNC: 9.2 MG/DL (ref 8.6–10.5)
CHLORIDE SERPL-SCNC: 103 MMOL/L (ref 98–107)
CO2 SERPL-SCNC: 26 MMOL/L (ref 22–29)
CREAT SERPL-MCNC: 0.76 MG/DL (ref 0.57–1)
GFR SERPL CREATININE-BSD FRML MDRD: 102 ML/MIN/1.73
GLUCOSE SERPL-MCNC: 96 MG/DL (ref 65–99)
POTASSIUM SERPL-SCNC: 4.1 MMOL/L (ref 3.5–5.2)
QT INTERVAL: 408 MS
QTC INTERVAL: 443 MS
SODIUM SERPL-SCNC: 140 MMOL/L (ref 136–145)

## 2021-07-29 PROCEDURE — 70553 MRI BRAIN STEM W/O & W/DYE: CPT

## 2021-07-29 PROCEDURE — 99214 OFFICE O/P EST MOD 30 MIN: CPT | Performed by: CLINICAL NURSE SPECIALIST

## 2021-07-29 PROCEDURE — A9577 INJ MULTIHANCE: HCPCS | Performed by: INTERNAL MEDICINE

## 2021-07-29 PROCEDURE — 80048 BASIC METABOLIC PNL TOTAL CA: CPT | Performed by: FAMILY MEDICINE

## 2021-07-29 PROCEDURE — 25010000002 ENOXAPARIN PER 10 MG: Performed by: FAMILY MEDICINE

## 2021-07-29 PROCEDURE — 96372 THER/PROPH/DIAG INJ SC/IM: CPT

## 2021-07-29 PROCEDURE — G0378 HOSPITAL OBSERVATION PER HR: HCPCS

## 2021-07-29 PROCEDURE — 0 GADOBENATE DIMEGLUMINE 529 MG/ML SOLUTION: Performed by: INTERNAL MEDICINE

## 2021-07-29 PROCEDURE — 95816 EEG AWAKE AND DROWSY: CPT

## 2021-07-29 PROCEDURE — 95816 EEG AWAKE AND DROWSY: CPT | Performed by: PSYCHIATRY & NEUROLOGY

## 2021-07-29 PROCEDURE — 25010000002 CEFTRIAXONE PER 250 MG: Performed by: FAMILY MEDICINE

## 2021-07-29 RX ORDER — ESCITALOPRAM OXALATE 10 MG/1
10 TABLET ORAL DAILY
Status: DISCONTINUED | OUTPATIENT
Start: 2021-07-29 | End: 2021-07-30 | Stop reason: HOSPADM

## 2021-07-29 RX ORDER — TOPIRAMATE 25 MG/1
25 TABLET ORAL NIGHTLY
Status: DISCONTINUED | OUTPATIENT
Start: 2021-07-29 | End: 2021-07-30 | Stop reason: HOSPADM

## 2021-07-29 RX ADMIN — CEFTRIAXONE SODIUM 1 G: 1 INJECTION, POWDER, FOR SOLUTION INTRAMUSCULAR; INTRAVENOUS at 18:12

## 2021-07-29 RX ADMIN — ENOXAPARIN SODIUM 40 MG: 40 INJECTION, SOLUTION INTRAVENOUS; SUBCUTANEOUS at 00:46

## 2021-07-29 RX ADMIN — GADOBENATE DIMEGLUMINE 17 ML: 529 INJECTION, SOLUTION INTRAVENOUS at 17:06

## 2021-07-29 RX ADMIN — ENOXAPARIN SODIUM 40 MG: 40 INJECTION, SOLUTION INTRAVENOUS; SUBCUTANEOUS at 21:59

## 2021-07-29 RX ADMIN — TOPIRAMATE 25 MG: 25 TABLET, FILM COATED ORAL at 21:59

## 2021-07-29 RX ADMIN — ESCITALOPRAM 10 MG: 10 TABLET, FILM COATED ORAL at 09:37

## 2021-07-29 RX ADMIN — ACETAMINOPHEN 650 MG: 325 TABLET, FILM COATED ORAL at 12:57

## 2021-07-30 VITALS
TEMPERATURE: 98.8 F | DIASTOLIC BLOOD PRESSURE: 86 MMHG | BODY MASS INDEX: 33.12 KG/M2 | HEART RATE: 86 BPM | RESPIRATION RATE: 18 BRPM | HEIGHT: 64 IN | SYSTOLIC BLOOD PRESSURE: 131 MMHG | WEIGHT: 194 LBS | OXYGEN SATURATION: 100 %

## 2021-07-30 LAB — BACTERIA SPEC AEROBE CULT: ABNORMAL

## 2021-07-30 PROCEDURE — 99214 OFFICE O/P EST MOD 30 MIN: CPT | Performed by: CLINICAL NURSE SPECIALIST

## 2021-07-30 PROCEDURE — G0378 HOSPITAL OBSERVATION PER HR: HCPCS

## 2021-07-30 RX ORDER — FLUCONAZOLE 150 MG/1
150 TABLET ORAL ONCE
Qty: 1 TABLET | Refills: 0 | Status: SHIPPED | OUTPATIENT
Start: 2021-07-30 | End: 2021-07-31

## 2021-07-30 RX ORDER — CEFDINIR 300 MG/1
300 CAPSULE ORAL 2 TIMES DAILY
Qty: 10 CAPSULE | Refills: 0 | Status: SHIPPED | OUTPATIENT
Start: 2021-07-30 | End: 2021-08-04

## 2021-07-30 RX ORDER — TOPIRAMATE 25 MG/1
TABLET ORAL
Qty: 120 TABLET | Refills: 1 | Status: SHIPPED | OUTPATIENT
Start: 2021-07-30 | End: 2021-09-09

## 2021-07-30 RX ADMIN — ESCITALOPRAM 10 MG: 10 TABLET, FILM COATED ORAL at 08:52

## 2021-07-30 RX ADMIN — ACETAMINOPHEN 650 MG: 325 TABLET, FILM COATED ORAL at 08:09

## 2021-08-12 ENCOUNTER — LAB (OUTPATIENT)
Dept: LAB | Facility: HOSPITAL | Age: 41
End: 2021-08-12

## 2021-08-12 ENCOUNTER — TRANSCRIBE ORDERS (OUTPATIENT)
Dept: ADMINISTRATIVE | Facility: HOSPITAL | Age: 41
End: 2021-08-12

## 2021-08-12 DIAGNOSIS — N30.01 ACUTE CYSTITIS WITH HEMATURIA: Primary | ICD-10-CM

## 2021-08-12 PROCEDURE — 81001 URINALYSIS AUTO W/SCOPE: CPT | Performed by: PHYSICIAN ASSISTANT

## 2021-08-12 PROCEDURE — 81003 URINALYSIS AUTO W/O SCOPE: CPT | Performed by: PHYSICIAN ASSISTANT

## 2021-08-13 LAB
BACTERIA UR QL AUTO: ABNORMAL /HPF
BILIRUB UR QL STRIP: NEGATIVE
BILIRUB UR QL STRIP: NEGATIVE
CLARITY UR: ABNORMAL
CLARITY UR: ABNORMAL
COLOR UR: YELLOW
COLOR UR: YELLOW
GLUCOSE UR STRIP-MCNC: NEGATIVE MG/DL
GLUCOSE UR STRIP-MCNC: NEGATIVE MG/DL
HGB UR QL STRIP.AUTO: NEGATIVE
HGB UR QL STRIP.AUTO: NEGATIVE
KETONES UR QL STRIP: ABNORMAL
KETONES UR QL STRIP: ABNORMAL
LEUKOCYTE ESTERASE UR QL STRIP.AUTO: NEGATIVE
LEUKOCYTE ESTERASE UR QL STRIP.AUTO: NEGATIVE
NITRITE UR QL STRIP: NEGATIVE
NITRITE UR QL STRIP: NEGATIVE
PH UR STRIP.AUTO: 5.5 [PH] (ref 5–8)
PH UR STRIP.AUTO: 6 [PH] (ref 5–8)
PROT UR QL STRIP: ABNORMAL
PROT UR QL STRIP: NEGATIVE
RBC # UR: ABNORMAL /HPF
REF LAB TEST METHOD: ABNORMAL
SP GR UR STRIP: 1.02 (ref 1–1.03)
SP GR UR STRIP: 1.03 (ref 1–1.03)
SQUAMOUS #/AREA URNS HPF: ABNORMAL /HPF
UROBILINOGEN UR QL STRIP: ABNORMAL
UROBILINOGEN UR QL STRIP: ABNORMAL
WBC UR QL AUTO: ABNORMAL /HPF

## 2021-08-16 ENCOUNTER — TRANSCRIBE ORDERS (OUTPATIENT)
Dept: ADMINISTRATIVE | Facility: HOSPITAL | Age: 41
End: 2021-08-16

## 2021-08-16 DIAGNOSIS — R80.9 PROTEINURIA, UNSPECIFIED TYPE: Primary | ICD-10-CM

## 2021-08-20 ENCOUNTER — OFFICE VISIT (OUTPATIENT)
Dept: URGENT CARE | Age: 41
End: 2021-08-20
Payer: COMMERCIAL

## 2021-08-20 VITALS
BODY MASS INDEX: 33.45 KG/M2 | SYSTOLIC BLOOD PRESSURE: 125 MMHG | OXYGEN SATURATION: 100 % | RESPIRATION RATE: 18 BRPM | WEIGHT: 188.8 LBS | HEART RATE: 90 BPM | HEIGHT: 63 IN | TEMPERATURE: 98.7 F | DIASTOLIC BLOOD PRESSURE: 88 MMHG

## 2021-08-20 DIAGNOSIS — N89.8 VAGINAL DISCHARGE: ICD-10-CM

## 2021-08-20 DIAGNOSIS — R10.32 LEFT LOWER QUADRANT ABDOMINAL PAIN: Primary | ICD-10-CM

## 2021-08-20 DIAGNOSIS — Z11.3 SCREENING FOR STD (SEXUALLY TRANSMITTED DISEASE): ICD-10-CM

## 2021-08-20 LAB
APPEARANCE FLUID: ABNORMAL
BILIRUBIN, POC: ABNORMAL
BLOOD URINE, POC: ABNORMAL
CLARITY, POC: CLEAR
COLOR, POC: YELLOW
GLUCOSE URINE, POC: ABNORMAL
KETONES, POC: ABNORMAL
LEUKOCYTE EST, POC: ABNORMAL
NITRITE, POC: ABNORMAL
PH, POC: 6
PROTEIN, POC: ABNORMAL
SPECIFIC GRAVITY, POC: 1.02
UROBILINOGEN, POC: 0.2

## 2021-08-20 PROCEDURE — 81002 URINALYSIS NONAUTO W/O SCOPE: CPT | Performed by: NURSE PRACTITIONER

## 2021-08-20 PROCEDURE — 99203 OFFICE O/P NEW LOW 30 MIN: CPT | Performed by: NURSE PRACTITIONER

## 2021-08-20 RX ORDER — ESCITALOPRAM OXALATE 20 MG/1
TABLET ORAL
COMMUNITY
Start: 2021-05-27

## 2021-08-20 ASSESSMENT — ENCOUNTER SYMPTOMS
VOMITING: 0
FLATUS: 0
DIARRHEA: 0
ABDOMINAL PAIN: 1
NAUSEA: 0
CONSTIPATION: 0

## 2021-08-20 ASSESSMENT — VISUAL ACUITY: OU: 1

## 2021-08-20 NOTE — PATIENT INSTRUCTIONS
Plenty of fluids  OTC Tylenol or Motrin as needed  Diatherix swab for STD we will call with results tomorrow  Follow up with PCP or return to Urgent Care for worsening or unresolved symptoms. Patient Education        Abdominal Pain: Care Instructions  Your Care Instructions     Abdominal pain has many possible causes. Some aren't serious and get better on their own in a few days. Others need more testing and treatment. If your pain continues or gets worse, you need to be rechecked and may need more tests to find out what is wrong. You may need surgery to correct the problem. Don't ignore new symptoms, such as fever, nausea and vomiting, urination problems, pain that gets worse, and dizziness. These may be signs of a more serious problem. Your doctor may have recommended a follow-up visit in the next 8 to 12 hours. If you are not getting better, you may need more tests or treatment. The doctor has checked you carefully, but problems can develop later. If you notice any problems or new symptoms, get medical treatment right away. Follow-up care is a key part of your treatment and safety. Be sure to make and go to all appointments, and call your doctor if you are having problems. It's also a good idea to know your test results and keep a list of the medicines you take. How can you care for yourself at home? · Rest until you feel better. · To prevent dehydration, drink plenty of fluids. Choose water and other caffeine-free clear liquids until you feel better. If you have kidney, heart, or liver disease and have to limit fluids, talk with your doctor before you increase the amount of fluids you drink. · If your stomach is upset, eat mild foods, such as rice, dry toast or crackers, bananas, and applesauce. Try eating several small meals instead of two or three large ones. · Wait until 48 hours after all symptoms have gone away before you have spicy foods, alcohol, and drinks that contain caffeine.   · Do not eat foods that are high in fat. · Avoid anti-inflammatory medicines such as aspirin, ibuprofen (Advil, Motrin), and naproxen (Aleve). These can cause stomach upset. Talk to your doctor if you take daily aspirin for another health problem. When should you call for help? Call 911 anytime you think you may need emergency care. For example, call if:    · You passed out (lost consciousness).     · You pass maroon or very bloody stools.     · You vomit blood or what looks like coffee grounds.     · You have new, severe belly pain. Call your doctor now or seek immediate medical care if:    · Your pain gets worse, especially if it becomes focused in one area of your belly.     · You have a new or higher fever.     · Your stools are black and look like tar, or they have streaks of blood.     · You have unexpected vaginal bleeding.     · You have symptoms of a urinary tract infection. These may include:  ? Pain when you urinate. ? Urinating more often than usual.  ? Blood in your urine.     · You are dizzy or lightheaded, or you feel like you may faint. Watch closely for changes in your health, and be sure to contact your doctor if:    · You are not getting better after 1 day (24 hours). Where can you learn more? Go to https://01Games Technology.SecureWaters. org and sign in to your Vrvana account. Enter F806 in the [facility] box to learn more about \"Abdominal Pain: Care Instructions. \"     If you do not have an account, please click on the \"Sign Up Now\" link. Current as of: October 19, 2020               Content Version: 12.9  © 2006-2021 eYeka. Care instructions adapted under license by [facility] ([facility]). If you have questions about a medical condition or this instruction, always ask your healthcare professional. [place] [address] any warranty or liability for your use of this information.          Patient Education        Exposure to Sexually Transmitted Infections: Care Instructions  Your Care Instructions  Sexually transmitted infections (STIs) are those diseases spread by sexual contact. There are at least 20 different STIs, including chlamydia, gonorrhea, syphilis, and human immunodeficiency virus (HIV), which causes AIDS. Bacteria-caused STIs can be treated and cured. STIs caused by viruses, such as HIV, can be treated but not cured. Some STIs can reduce a woman's chances of getting pregnant in the future. STIs are spread during sexual contact, such as vaginal intercourse and oral or anal sex. Follow-up care is a key part of your treatment and safety. Be sure to make and go to all appointments, and call your doctor if you are having problems. It's also a good idea to know your test results and keep a list of the medicines you take. How can you care for yourself at home? · Take medicines exactly as prescribed. · If your doctor prescribed antibiotics, take them as directed. Do not stop taking them just because you feel better. You need to take the full course of antibiotics. · Tell your sex partner (or partners) that they will need treatment. · If you are a woman, do not douche. Douching changes the normal balance of bacteria in the vagina. It may also spread an infection up into your reproductive organs. How can you prevent sexually transmitted infections (STIs)? You can help prevent STIs if you wait to have sex with a new partner (or partners) until you've each been tested for STIs. It also helps if you use condoms (male or female condoms) and if you limit your sex partners to one person who only has sex with you. When should you call for help? Call your doctor now or seek immediate medical care if:    · You have new pain in your belly or pelvis.     · You have symptoms of a urinary tract infection. These may include:  ? Pain or burning when you urinate. ? A frequent need to urinate without being able to pass much urine.   ? Pain in the flank, which is just below the rib cage and above the waist on either side of the back. ? Blood in your urine. ? A fever.     · You have new or worsening pain or swelling in the scrotum. Watch closely for changes in your health, and be sure to contact your doctor if:    · You have unusual vaginal bleeding.     · You have a discharge from the vagina or penis.     · You have any new symptoms, such as sores, bumps, rashes, blisters, or warts.     · You have itching, tingling, pain, or burning in the genital or anal area.     · You think you may have an STI. Where can you learn more? Go to https://GarmorpeRenovoRxeb.healthStor Networkspartners. org and sign in to your Herzio account. Enter T016 in the Forerun box to learn more about \"Exposure to Sexually Transmitted Infections: Care Instructions. \"     If you do not have an account, please click on the \"Sign Up Now\" link. Current as of: February 11, 2021               Content Version: 12.9  © 2006-2021 Healthwise, [facility]. Care instructions adapted under license by [facility] ([facility]). If you have questions about a medical condition or this instruction, always ask your healthcare professional. [place] [address] any warranty or liability for your use of this information.

## 2021-08-20 NOTE — PROGRESS NOTES
[address] [facility]  [address] [address] [place] [id number]  Dept: [phone]  Dept Fax: [phone]  Loc: [phone]    Beatrice Alonso is a 36 y.o. female who presents today for her medical conditions/complaintsas noted below. Beatrice Alonso is c/o of Abdominal Pain (LLQ), vaginal discharge        HPI:     Abdominal Pain  This is a new problem. The current episode started yesterday. The onset quality is sudden. The problem occurs intermittently. The pain is located in the RLQ and LLQ. The pain is at a severity of 4/10. The pain is mild. The quality of the pain is aching and cramping. The abdominal pain does not radiate. Pertinent negatives include no arthralgias, constipation, diarrhea, dysuria, fever, flatus, frequency, myalgias, nausea or vomiting. Nothing aggravates the pain. The pain is relieved by nothing. Treatments tried: OTC medication for yeast. The treatment provided no relief. Katie Pablo tells me 2 weeks ago she was admitted to the hospital for seizures related a UTI. She was given Rocephin and this caused a yeast infection. She is having vaginal discharge that is white in color and she is concerned her boyfriend may have cheated on her while she was in the hospital.  She was told she needs to drink more water because she is spilling ketones in her urine.   Past Medical History:   Diagnosis Date    Abdominal pain     Acid reflux     Acute bronchitis     Anemia     Anxiety     Asthma     Chronic back pain     Constipation     Depression     Headache(784.0)     Hyperlipidemia     Hypermenorrhea     Insomnia     Intracranial aneurysm     Overweight     Prolonged emergence from general anesthesia     Seizures (HCC)     UTI (urinary tract infection)     Vagina, candidiasis     Vitamin D deficiency      Past Surgical History:   Procedure Laterality Date    APPENDECTOMY      BRAIN SURGERY      Aneurysm repair     SECTION      CHOLECYSTECTOMY      COLONOSCOPY  04/24/2015    Dr. Shay Inch      back    HYSTERECTOMY      SC COLONOSCOPY FLX DX W/COLLJ SPEC WHEN PFRMD N/A 5/12/2017    Dr Britney Jimenez, 15 yr (age 48) recall    SC EGD TRANSORAL BIOPSY SINGLE/MULTIPLE N/A 5/24/2017    Dr Britney Jimenez, Samira (-)    UTERINE FIBROID EMBOLIZATION         Family History   Problem Relation Age of Onset    Hypertension Father     High Cholesterol Father     Colon Polyps Sister     Anemia Sister     Colon Polyps Mother     Crohn's Disease Maternal Uncle     Colon Cancer Neg Hx     Esophageal Cancer Neg Hx     Liver Cancer Neg Hx     Rectal Cancer Neg Hx     Stomach Cancer Neg Hx     Liver Disease Neg Hx        Social History     Tobacco Use    Smoking status: Never Smoker    Smokeless tobacco: Never Used   Substance Use Topics    Alcohol use: No      Current Outpatient Medications   Medication Sig Dispense Refill    escitalopram (LEXAPRO) 20 MG tablet       topiramate (TOPAMAX) 25 MG tablet Take 100 mg by mouth 2 times daily       verapamil (VERELAN) 120 MG CR capsule Take 120 mg by mouth nightly      Albuterol Sulfate (PROAIR HFA IN) Inhale into the lungs.  omeprazole (PRILOSEC) 20 MG capsule Take 20 mg by mouth daily. No current facility-administered medications for this visit.      Allergies   Allergen Reactions    Latex     Ampicillin     Infed [Iron Dextran]     Lortab [Hydrocodone-Acetaminophen]     Macrodantin [Nitrofurantoin]     Jimmy Butter Hives     Allergic to jimmy fruit     Morphine     Poison Ivy Extract [Poison Ivy Extract]     Lamotrigine Rash    Peanut-Containing Drug Products Rash       Health Maintenance   Topic Date Due    Hepatitis C screen  Never done    Varicella vaccine (1 of 2 - 2-dose childhood series) Never done    HIV screen  Never done    DTaP/Tdap/Td vaccine (1 - Tdap) Never done    Cervical cancer screen  Never done    Lipid screen  Never done    Diabetes screen  Never done    Flu vaccine (1) 09/01/2021    Colon cancer screen colonoscopy  05/12/2030    COVID-19 Vaccine  Completed    Hepatitis A vaccine  Aged Out    Hepatitis B vaccine  Aged Out    Hib vaccine  Aged Out    Meningococcal (ACWY) vaccine  Aged Out    Pneumococcal 0-64 years Vaccine  Aged Out       Subjective:     Review of Systems   Constitutional: Negative for activity change, appetite change, chills and fever. Gastrointestinal: Positive for abdominal pain. Negative for constipation, diarrhea, flatus, nausea and vomiting. Lower abd pain   Genitourinary: Positive for vaginal discharge. Negative for dysuria, flank pain and frequency. Musculoskeletal: Negative for arthralgias and myalgias. Skin: Negative for rash.       :Objective      Physical Exam  Vitals and nursing note reviewed. Constitutional:       General: She is awake. She is not in acute distress. Appearance: Normal appearance. She is well-developed, well-groomed and overweight. She is not ill-appearing. HENT:      Head: Normocephalic. Right Ear: Hearing normal.      Left Ear: Hearing normal.      Nose: Nose normal.      Mouth/Throat:      Lips: Pink. Eyes:      General: Vision grossly intact. Neck:      Trachea: Phonation normal.   Cardiovascular:      Rate and Rhythm: Normal rate and regular rhythm. Heart sounds: Normal heart sounds, S1 normal and S2 normal. No murmur heard. No friction rub. No gallop. Pulmonary:      Effort: Pulmonary effort is normal. No respiratory distress. Breath sounds: Normal breath sounds and air entry. No wheezing, rhonchi or rales. Abdominal:      General: Abdomen is flat. Bowel sounds are normal.      Palpations: Abdomen is soft. Tenderness: There is abdominal tenderness in the suprapubic area. There is no right CVA tenderness, left CVA tenderness, guarding or rebound.       Comments: Mild tenderness right lower and left lower quad as well as suprapubic Musculoskeletal:         General: No tenderness or deformity. Normal range of motion. Cervical back: Neck supple. Skin:     General: Skin is warm and dry. Capillary Refill: Capillary refill takes less than 2 seconds. Neurological:      General: No focal deficit present. Mental Status: She is alert, oriented to person, place, and time and easily aroused. Psychiatric:         Attention and Perception: Attention normal.         Mood and Affect: Mood normal.         Speech: Speech normal.         Behavior: Behavior normal. Behavior is cooperative. /88   Pulse 90   Temp 98.7 °F (37.1 °C)   Resp 18   Ht 5' 3\" (1.6 m)   Wt 188 lb 12.8 oz (85.6 kg)   SpO2 100%   BMI 33.44 kg/m²     :Assessment       Diagnosis Orders   1. Left lower quadrant abdominal pain  POCT Urinalysis no Micro   2. Vaginal discharge     3. Screening for STD (sexually transmitted disease)         :Plan      Orders Placed This Encounter   Procedures    POCT Urinalysis no Micro     Results for orders placed or performed in visit on 08/20/21   POCT Urinalysis no Micro   Result Value Ref Range    Color, UA yellow     Clarity, UA clear     Glucose, UA POC neg     Bilirubin, UA neg     Ketones, UA trace (A)     Spec Grav, UA 1.025     Blood, UA POC neg     pH, UA 6.0     Protein, UA POC neg     Urobilinogen, UA 0.2     Leukocytes, UA neg     Nitrite, UA neg     Appearance, Fluid           No follow-ups on file. No orders of the defined types were placed in this encounter. Patient Instructions     Plenty of fluids  OTC Tylenol or Motrin as needed  Diatherix swab for STD we will call with results tomorrow  Follow up with PCP or return to Urgent Care for worsening or unresolved symptoms. Patient Education        Abdominal Pain: Care Instructions  Your Care Instructions     Abdominal pain has many possible causes. Some aren't serious and get better on their own in a few days.  Others need more testing and treatment. If your pain continues or gets worse, you need to be rechecked and may need more tests to find out what is wrong. You may need surgery to correct the problem. Don't ignore new symptoms, such as fever, nausea and vomiting, urination problems, pain that gets worse, and dizziness. These may be signs of a more serious problem. Your doctor may have recommended a follow-up visit in the next 8 to 12 hours. If you are not getting better, you may need more tests or treatment. The doctor has checked you carefully, but problems can develop later. If you notice any problems or new symptoms, get medical treatment right away. Follow-up care is a key part of your treatment and safety. Be sure to make and go to all appointments, and call your doctor if you are having problems. It's also a good idea to know your test results and keep a list of the medicines you take. How can you care for yourself at home? · Rest until you feel better. · To prevent dehydration, drink plenty of fluids. Choose water and other caffeine-free clear liquids until you feel better. If you have kidney, heart, or liver disease and have to limit fluids, talk with your doctor before you increase the amount of fluids you drink. · If your stomach is upset, eat mild foods, such as rice, dry toast or crackers, bananas, and applesauce. Try eating several small meals instead of two or three large ones. · Wait until 48 hours after all symptoms have gone away before you have spicy foods, alcohol, and drinks that contain caffeine. · Do not eat foods that are high in fat. · Avoid anti-inflammatory medicines such as aspirin, ibuprofen (Advil, Motrin), and naproxen (Aleve). These can cause stomach upset. Talk to your doctor if you take daily aspirin for another health problem. When should you call for help? Call 911 anytime you think you may need emergency care.  For example, call if:    · You passed out (lost consciousness).     · You pass maroon or very bloody stools.     · You vomit blood or what looks like coffee grounds.     · You have new, severe belly pain. Call your doctor now or seek immediate medical care if:    · Your pain gets worse, especially if it becomes focused in one area of your belly.     · You have a new or higher fever.     · Your stools are black and look like tar, or they have streaks of blood.     · You have unexpected vaginal bleeding.     · You have symptoms of a urinary tract infection. These may include:  ? Pain when you urinate. ? Urinating more often than usual.  ? Blood in your urine.     · You are dizzy or lightheaded, or you feel like you may faint. Watch closely for changes in your health, and be sure to contact your doctor if:    · You are not getting better after 1 day (24 hours). Where can you learn more? Go to https://MySmartPricepeCartesian.Seamless Toy Company. org and sign in to your Revisu account. Enter C598 in the ideaForge box to learn more about \"Abdominal Pain: Care Instructions. \"     If you do not have an account, please click on the \"Sign Up Now\" link. Current as of: October 19, 2020               Content Version: 12.9  © 2006-2021 [email or web address]. Care instructions adapted under license by [facility] ([facility]). If you have questions about a medical condition or this instruction, always ask your healthcare professional. [place] [address] any warranty or liability for your use of this information. Patient Education        Exposure to Sexually Transmitted Infections: Care Instructions  Your Care Instructions  Sexually transmitted infections (STIs) are those diseases spread by sexual contact. There are at least 20 different STIs, including chlamydia, gonorrhea, syphilis, and human immunodeficiency virus (HIV), which causes AIDS. Bacteria-caused STIs can be treated and cured. STIs caused by viruses, such as HIV, can be treated but not cured.  Some STIs can reduce a woman's chances of getting pregnant in the future. STIs are spread during sexual contact, such as vaginal intercourse and oral or anal sex. Follow-up care is a key part of your treatment and safety. Be sure to make and go to all appointments, and call your doctor if you are having problems. It's also a good idea to know your test results and keep a list of the medicines you take. How can you care for yourself at home? · Take medicines exactly as prescribed. · If your doctor prescribed antibiotics, take them as directed. Do not stop taking them just because you feel better. You need to take the full course of antibiotics. · Tell your sex partner (or partners) that they will need treatment. · If you are a woman, do not douche. Douching changes the normal balance of bacteria in the vagina. It may also spread an infection up into your reproductive organs. How can you prevent sexually transmitted infections (STIs)? You can help prevent STIs if you wait to have sex with a new partner (or partners) until you've each been tested for STIs. It also helps if you use condoms (male or female condoms) and if you limit your sex partners to one person who only has sex with you. When should you call for help? Call your doctor now or seek immediate medical care if:    · You have new pain in your belly or pelvis.     · You have symptoms of a urinary tract infection. These may include:  ? Pain or burning when you urinate. ? A frequent need to urinate without being able to pass much urine. ? Pain in the flank, which is just below the rib cage and above the waist on either side of the back. ? Blood in your urine. ? A fever.     · You have new or worsening pain or swelling in the scrotum.    Watch closely for changes in your health, and be sure to contact your doctor if:    · You have unusual vaginal bleeding.     · You have a discharge from the vagina or penis.     · You have any new symptoms, such as sores, bumps, rashes, blisters, or warts.     · You have itching, tingling, pain, or burning in the genital or anal area.     · You think you may have an STI. Where can you learn more? Go to https://Enxue.compepiceweb.health-partners. org and sign in to your Chipolo account. Enter X834 in the [facility] box to learn more about \"Exposure to Sexually Transmitted Infections: Care Instructions. \"     If you do not have an account, please click on the \"Sign Up Now\" link. Current as of: February 11, 2021               Content Version: 12.9  © 6140-2410 Huzco. Care instructions adapted under license by [facility] [address] If you have questions about a medical condition or this instruction, always ask your healthcare professional. [place] [address] any warranty or liability for your use of this information. Patient given educational materials- see patient instructions. Discussed use, benefit, and side effects of prescribedmedications. All patient questions answered. Pt voiced understanding.        Electronically signed by KURT Cope CNP on 8/20/2021 at 9:00 AM

## 2021-08-22 DIAGNOSIS — N76.0 BV (BACTERIAL VAGINOSIS): ICD-10-CM

## 2021-08-22 DIAGNOSIS — B96.89 BV (BACTERIAL VAGINOSIS): Primary | ICD-10-CM

## 2021-08-22 DIAGNOSIS — B96.89 BV (BACTERIAL VAGINOSIS): ICD-10-CM

## 2021-08-22 DIAGNOSIS — N76.0 BV (BACTERIAL VAGINOSIS): Primary | ICD-10-CM

## 2021-08-22 RX ORDER — DOXYCYCLINE HYCLATE 100 MG
100 TABLET ORAL 2 TIMES DAILY
Qty: 20 TABLET | Refills: 0 | Status: SHIPPED | OUTPATIENT
Start: 2021-08-22 | End: 2021-09-01

## 2021-08-22 RX ORDER — METRONIDAZOLE 500 MG/1
500 TABLET ORAL 2 TIMES DAILY
Qty: 14 TABLET | Refills: 0 | Status: SHIPPED | OUTPATIENT
Start: 2021-08-22 | End: 2021-08-29

## 2021-08-22 RX ORDER — METRONIDAZOLE 500 MG/1
500 TABLET ORAL 2 TIMES DAILY
Qty: 14 TABLET | Refills: 0 | Status: SHIPPED | OUTPATIENT
Start: 2021-08-22 | End: 2021-08-22

## 2021-08-22 RX ORDER — DOXYCYCLINE HYCLATE 100 MG
100 TABLET ORAL 2 TIMES DAILY
Qty: 20 TABLET | Refills: 0 | Status: SHIPPED | OUTPATIENT
Start: 2021-08-22 | End: 2021-08-22

## 2021-08-22 NOTE — PROGRESS NOTES
Please let patient know that she is positive for 3 types of bacteria that cause BV- Atopobium, gardnerella, and ureaplasma. I am sending her in 2 antibiotics to start.

## 2021-09-09 ENCOUNTER — OFFICE VISIT (OUTPATIENT)
Dept: NEUROLOGY | Facility: CLINIC | Age: 41
End: 2021-09-09

## 2021-09-09 ENCOUNTER — APPOINTMENT (OUTPATIENT)
Dept: LAB | Facility: HOSPITAL | Age: 41
End: 2021-09-09

## 2021-09-09 ENCOUNTER — LAB (OUTPATIENT)
Dept: LAB | Facility: HOSPITAL | Age: 41
End: 2021-09-09

## 2021-09-09 ENCOUNTER — TRANSCRIBE ORDERS (OUTPATIENT)
Dept: ADMINISTRATIVE | Facility: HOSPITAL | Age: 41
End: 2021-09-09

## 2021-09-09 VITALS
WEIGHT: 182.4 LBS | BODY MASS INDEX: 32.32 KG/M2 | HEART RATE: 100 BPM | DIASTOLIC BLOOD PRESSURE: 92 MMHG | SYSTOLIC BLOOD PRESSURE: 138 MMHG | OXYGEN SATURATION: 99 % | HEIGHT: 63 IN

## 2021-09-09 DIAGNOSIS — E66.9 OBESITY, UNSPECIFIED CLASSIFICATION, UNSPECIFIED OBESITY TYPE, UNSPECIFIED WHETHER SERIOUS COMORBIDITY PRESENT: Primary | ICD-10-CM

## 2021-09-09 DIAGNOSIS — D64.9 ANEMIA, UNSPECIFIED TYPE: ICD-10-CM

## 2021-09-09 DIAGNOSIS — G43.709 CHRONIC MIGRAINE WITHOUT AURA WITHOUT STATUS MIGRAINOSUS, NOT INTRACTABLE: ICD-10-CM

## 2021-09-09 DIAGNOSIS — G40.909 SEIZURE DISORDER (HCC): ICD-10-CM

## 2021-09-09 DIAGNOSIS — G40.909 SEIZURE DISORDER (HCC): Primary | ICD-10-CM

## 2021-09-09 DIAGNOSIS — E66.9 OBESITY, UNSPECIFIED CLASSIFICATION, UNSPECIFIED OBESITY TYPE, UNSPECIFIED WHETHER SERIOUS COMORBIDITY PRESENT: ICD-10-CM

## 2021-09-09 LAB
ALBUMIN SERPL-MCNC: 4.3 G/DL (ref 3.5–5.2)
ALBUMIN/GLOB SERPL: 1.4 G/DL
ALP SERPL-CCNC: 75 U/L (ref 39–117)
ALT SERPL W P-5'-P-CCNC: 13 U/L (ref 1–33)
ANION GAP SERPL CALCULATED.3IONS-SCNC: 10 MMOL/L (ref 5–15)
AST SERPL-CCNC: 14 U/L (ref 1–32)
BILIRUB SERPL-MCNC: 0.2 MG/DL (ref 0–1.2)
BUN SERPL-MCNC: 15 MG/DL (ref 6–20)
BUN/CREAT SERPL: 17.4 (ref 7–25)
CALCIUM SPEC-SCNC: 9.5 MG/DL (ref 8.6–10.5)
CHLORIDE SERPL-SCNC: 109 MMOL/L (ref 98–107)
CHOLEST SERPL-MCNC: 235 MG/DL (ref 0–200)
CO2 SERPL-SCNC: 21 MMOL/L (ref 22–29)
CREAT SERPL-MCNC: 0.86 MG/DL (ref 0.57–1)
DEPRECATED RDW RBC AUTO: 50.4 FL (ref 37–54)
ERYTHROCYTE [DISTWIDTH] IN BLOOD BY AUTOMATED COUNT: 16.2 % (ref 12.3–15.4)
GFR SERPL CREATININE-BSD FRML MDRD: 89 ML/MIN/1.73
GLOBULIN UR ELPH-MCNC: 3 GM/DL
GLUCOSE SERPL-MCNC: 119 MG/DL (ref 65–99)
HBA1C MFR BLD: 6.2 % (ref 4.8–5.6)
HCT VFR BLD AUTO: 40.9 % (ref 34–46.6)
HDLC SERPL-MCNC: 55 MG/DL (ref 40–60)
HGB BLD-MCNC: 13.1 G/DL (ref 12–15.9)
LDLC SERPL CALC-MCNC: 134 MG/DL (ref 0–100)
LDLC/HDLC SERPL: 2.34 {RATIO}
MCH RBC QN AUTO: 27.4 PG (ref 26.6–33)
MCHC RBC AUTO-ENTMCNC: 32 G/DL (ref 31.5–35.7)
MCV RBC AUTO: 85.6 FL (ref 79–97)
PLATELET # BLD AUTO: 276 10*3/MM3 (ref 140–450)
PMV BLD AUTO: 10.7 FL (ref 6–12)
POTASSIUM SERPL-SCNC: 3.9 MMOL/L (ref 3.5–5.2)
PROT SERPL-MCNC: 7.3 G/DL (ref 6–8.5)
RBC # BLD AUTO: 4.78 10*6/MM3 (ref 3.77–5.28)
SODIUM SERPL-SCNC: 140 MMOL/L (ref 136–145)
TRIGL SERPL-MCNC: 257 MG/DL (ref 0–150)
VLDLC SERPL-MCNC: 46 MG/DL (ref 5–40)
WBC # BLD AUTO: 9.62 10*3/MM3 (ref 3.4–10.8)

## 2021-09-09 PROCEDURE — 80053 COMPREHEN METABOLIC PANEL: CPT

## 2021-09-09 PROCEDURE — 83036 HEMOGLOBIN GLYCOSYLATED A1C: CPT

## 2021-09-09 PROCEDURE — 85027 COMPLETE CBC AUTOMATED: CPT

## 2021-09-09 PROCEDURE — 99214 OFFICE O/P EST MOD 30 MIN: CPT | Performed by: PHYSICIAN ASSISTANT

## 2021-09-09 PROCEDURE — 80061 LIPID PANEL: CPT

## 2021-09-09 PROCEDURE — 36415 COLL VENOUS BLD VENIPUNCTURE: CPT

## 2021-09-09 RX ORDER — TOPIRAMATE 50 MG/1
50 TABLET, FILM COATED ORAL 2 TIMES DAILY
Qty: 60 TABLET | Refills: 5 | Status: SHIPPED | OUTPATIENT
Start: 2021-09-09 | End: 2022-04-22 | Stop reason: SDUPTHER

## 2021-09-09 NOTE — PROGRESS NOTES
Neurology Progress Note      Chief Complaint:    Seizure disorder  Migraine headache disorder    Subjective     Subjective:  Patient returns to clinic today after her July hospitalization patient had breakthrough seizure in the setting of underlying urinary tract infection.  At the time, she was started on topiramate and is now taking 50 mg twice daily.  She is tolerating this without any difficulties except for some mild paresthesias in bilateral feet.  She is still refraining from driving because of the seizure activity.  She otherwise has no complaints with the medication other than it is helping with weight loss which, at this time, is desirable for her.      Past Medical History:   Diagnosis Date   • Cerebral aneurysm    • GERD (gastroesophageal reflux disease)    • H/O: hysterectomy    • Hypertension    • Iron deficiency anemia    • Seizure (CMS/HCC)      Past Surgical History:   Procedure Laterality Date   • APPENDECTOMY     •  SECTION      x2   • CHOLECYSTECTOMY     • COLONOSCOPY     • CRANIOTOMY     • ENDOMETRIAL ABLATION     • HYSTERECTOMY       Family History   Problem Relation Age of Onset   • Rheum arthritis Mother    • No Known Problems Brother    • No Known Problems Sister    • No Known Problems Daughter    • No Known Problems Brother    • No Known Problems Brother    • No Known Problems Brother    • No Known Problems Daughter    • Breast cancer Maternal Great-Grandmother      Social History     Tobacco Use   • Smoking status: Never Smoker   • Smokeless tobacco: Never Used   Substance Use Topics   • Alcohol use: No   • Drug use: No       Medications:  Current Outpatient Medications   Medication Sig Dispense Refill   • escitalopram (LEXAPRO) 20 MG tablet Take 1 tablet by mouth every day. 30 tablet 5   • fluticasone (FLONASE) 50 MCG/ACT nasal spray Use 2 sprays into the nostril as directed by provider Daily. 16 g 5   • galcanezumab-gnlm (EMGALITY) 120 MG/ML prefilled syringe Inject 1 mL under  the skin into the appropriate area as directed Every 30 (Thirty) Days. 1 mL 11   • meloxicam (MOBIC) 15 MG tablet Take 1 tablet by mouth daily 30 tablet 2   • triamterene-hydrochlorothiazide (MAXZIDE-25) 37.5-25 MG per tablet Take 1/2 tablet by mouth once daily as needed for elevated blood pressure or swelling 30 tablet 1   • ubrogepant (ubrogepant) 100 MG tablet Take 1 tablet by mouth at onset of migraine may repeat in 2 hours if needed max 2 tablets in 24 hours 30 tablet 11   • clobetasol (TEMOVATE) 0.05 % ointment Apply twice a day to affected areas on breast for eczema. 15 g 3   • topiramate (Topamax) 50 MG tablet Take 1 tablet by mouth 2 (Two) Times a Day. 60 tablet 5   • traZODone (DESYREL) 50 MG tablet Take 0.5 tablet by mouth Daily for 7 days, THEN 1 tablet Daily As Needed for up to 23 days. 30 tablet 2     No current facility-administered medications for this visit.       Allergies:    Infed [iron dextran], Hydromorphone hcl, Medon butter, Ampicillin, Lamotrigine, Latex, Lortab [hydrocodone-acetaminophen], Morphine, Nitrofurantoin, Nuts, and Poison ivy extract    Review of Systems:   -A 14 point review of systems is completed and is negative.      Objective      Vital Signs  Heart Rate:  [100] 100  BP: (138)/(92) 138/92    Physical Exam:    General Exam:  Head:  Normocephalic, atraumatic.  HEENT: PERRLA.  Full EOM.  Neck:  No lymphadenopathy, thyromegaly or bruit.  Cardiac:  Regular rate and rhythm.  Normal S1, S2.  No murmur, rub or gallop.  Lungs:  Clear to auscultation bilaterally.  No wheeze, rales or rhonchi.  Abdomen:  Non-tender, Non-distended.  Bowel sounds normoactive.  Extremities: Full peripheral pulses.  No clubbing, cyanosis or edema.  Skin: No ulceration, breakdown or rash.      Neurologic Exam:  CERVICAL SPINE EXAMINATION:  RANGE OF MOTION: The patient is able to flex, extend, rotate, and side bend without pain or difficulty.  There is full range of motion.    PALPATION: Nontender to  palpation midline and through upper cervical musculature.  STRENGTH: 5/5 bilateral trapezius, deltoid, triceps, biceps, wrist extensors/flexors, finger opposition.  SENSATION: Light touch and pinprick intact C5-T1 bilaterally.  REFLEXES: DTRs are 2+ bilaterally at biceps, triceps, and brachioradialis.  Poole's and palmomental are negative bilaterally.  SPECIAL TESTS:  Tinel's & Phalen's are negative. Spurling's is negative bilaterally.     Coordination:  -Finger to nose intact BUEs  -Heel to shin intact BLEs  -No ataxia     Gait  -No signs of ataxia  -ambulates unassisted       Results Review:        Assessment/Plan     Impression:  1.  Seizure disorder  2.  Episodic migraine      Plan:  1.  Continue topiramate.  I did refill this at 50 mg twice daily.  She does not want to take the extended release formulation of the medication as she has taken this in the past with intolerance.  She states she is tolerating this medication without any difficulty at this time.    2.  Seizure precautions were discussed to include no tub baths, no swimming, avoiding lack of sleep, and avoiding known triggers. Education given of things that may contribute to a seizure to include, but not limited to: stressful situations, fever, fatigue, lack of sleep, low blood sugar, hyperventilation, flashing lights, and caffeine. Instructions given to take seizure medications as prescribed. Education given to family member on what to do during a seizure and care following the seizure. Education given to contact this office prior to stopping or changing any medications.    3.  No driving for 90 days.    4.  This may also help benefit in reduction of her overall migraine headaches and she is tolerating without difficulty.  She will call with concerns or questions in the interim and otherwise follow-up with neurology in 6 months and notify us if she has breakthrough seizure activity.          Paramjit Nielsen PA-C  09/09/21  11:04 CDT      Greater  than 30 minutes spent preparing the patient's visit in chart, reviewing past history and diagnostic studies including imaging and laboratory evaluation, obtaining clinical history, performing physical examination, and counseling the patient on the prescribed plan of therapy and care, ordering diagnostic testing, making appropriate referrals, documenting the encounter and interpretation of results and coordination of care.

## 2021-10-05 ENCOUNTER — HOSPITAL ENCOUNTER (OUTPATIENT)
Dept: GENERAL RADIOLOGY | Facility: HOSPITAL | Age: 41
Discharge: HOME | End: 2021-10-05
Admitting: INTERNAL MEDICINE

## 2021-10-05 ENCOUNTER — TRANSCRIBE ORDERS (OUTPATIENT)
Dept: ADMINISTRATIVE | Facility: HOSPITAL | Age: 41
End: 2021-10-05

## 2021-10-05 DIAGNOSIS — R06.00 DYSPNEA, UNSPECIFIED TYPE: Primary | ICD-10-CM

## 2021-10-05 DIAGNOSIS — R06.00 DYSPNEA, UNSPECIFIED TYPE: ICD-10-CM

## 2021-10-05 PROCEDURE — 71046 X-RAY EXAM CHEST 2 VIEWS: CPT

## 2021-10-06 PROCEDURE — 87635 SARS-COV-2 COVID-19 AMP PRB: CPT | Performed by: NURSE PRACTITIONER

## 2021-10-07 ENCOUNTER — APPOINTMENT (OUTPATIENT)
Dept: CT IMAGING | Facility: HOSPITAL | Age: 41
End: 2021-10-07

## 2021-10-07 ENCOUNTER — HOSPITAL ENCOUNTER (EMERGENCY)
Facility: HOSPITAL | Age: 41
Discharge: HOME | End: 2021-10-08
Admitting: STUDENT IN AN ORGANIZED HEALTH CARE EDUCATION/TRAINING PROGRAM

## 2021-10-07 ENCOUNTER — APPOINTMENT (OUTPATIENT)
Dept: GENERAL RADIOLOGY | Facility: HOSPITAL | Age: 41
End: 2021-10-07

## 2021-10-07 DIAGNOSIS — J18.9 PNEUMONIA DUE TO INFECTIOUS ORGANISM, UNSPECIFIED LATERALITY, UNSPECIFIED PART OF LUNG: Primary | ICD-10-CM

## 2021-10-07 LAB
ALBUMIN SERPL-MCNC: 4.4 G/DL (ref 3.5–5.2)
ALBUMIN/GLOB SERPL: 1.4 G/DL
ALP SERPL-CCNC: 72 U/L (ref 39–117)
ALT SERPL W P-5'-P-CCNC: 17 U/L (ref 1–33)
ANION GAP SERPL CALCULATED.3IONS-SCNC: 13 MMOL/L (ref 5–15)
AST SERPL-CCNC: 17 U/L (ref 1–32)
BASOPHILS # BLD AUTO: 0.04 10*3/MM3 (ref 0–0.2)
BASOPHILS NFR BLD AUTO: 0.5 % (ref 0–1.5)
BILIRUB SERPL-MCNC: 0.2 MG/DL (ref 0–1.2)
BUN SERPL-MCNC: 9 MG/DL (ref 6–20)
BUN/CREAT SERPL: 12.2 (ref 7–25)
CALCIUM SPEC-SCNC: 9.5 MG/DL (ref 8.6–10.5)
CHLORIDE SERPL-SCNC: 105 MMOL/L (ref 98–107)
CO2 SERPL-SCNC: 17 MMOL/L (ref 22–29)
CREAT SERPL-MCNC: 0.74 MG/DL (ref 0.57–1)
CRP SERPL-MCNC: <0.3 MG/DL (ref 0–0.5)
D DIMER PPP FEU-MCNC: 0.56 MG/L (FEU) (ref 0–0.5)
D-LACTATE SERPL-SCNC: 1.5 MMOL/L (ref 0.5–2)
DEPRECATED RDW RBC AUTO: 49.1 FL (ref 37–54)
EOSINOPHIL # BLD AUTO: 0.31 10*3/MM3 (ref 0–0.4)
EOSINOPHIL NFR BLD AUTO: 4.1 % (ref 0.3–6.2)
ERYTHROCYTE [DISTWIDTH] IN BLOOD BY AUTOMATED COUNT: 16 % (ref 12.3–15.4)
ERYTHROCYTE [SEDIMENTATION RATE] IN BLOOD: 50 MM/HR (ref 0–20)
FERRITIN SERPL-MCNC: 316.5 NG/ML (ref 13–150)
FLUAV RNA RESP QL NAA+PROBE: NOT DETECTED
FLUBV RNA RESP QL NAA+PROBE: NOT DETECTED
GFR SERPL CREATININE-BSD FRML MDRD: 105 ML/MIN/1.73
GLOBULIN UR ELPH-MCNC: 3.2 GM/DL
GLUCOSE SERPL-MCNC: 111 MG/DL (ref 65–99)
HCT VFR BLD AUTO: 40.8 % (ref 34–46.6)
HGB BLD-MCNC: 13.2 G/DL (ref 12–15.9)
IMM GRANULOCYTES # BLD AUTO: 0.02 10*3/MM3 (ref 0–0.05)
IMM GRANULOCYTES NFR BLD AUTO: 0.3 % (ref 0–0.5)
INR PPP: 0.95 (ref 0.91–1.09)
LDH SERPL-CCNC: 261 U/L (ref 135–214)
LYMPHOCYTES # BLD AUTO: 1.62 10*3/MM3 (ref 0.7–3.1)
LYMPHOCYTES NFR BLD AUTO: 21.6 % (ref 19.6–45.3)
MCH RBC QN AUTO: 26.9 PG (ref 26.6–33)
MCHC RBC AUTO-ENTMCNC: 32.4 G/DL (ref 31.5–35.7)
MCV RBC AUTO: 83.3 FL (ref 79–97)
MONOCYTES # BLD AUTO: 0.67 10*3/MM3 (ref 0.1–0.9)
MONOCYTES NFR BLD AUTO: 8.9 % (ref 5–12)
NEUTROPHILS NFR BLD AUTO: 4.84 10*3/MM3 (ref 1.7–7)
NEUTROPHILS NFR BLD AUTO: 64.6 % (ref 42.7–76)
NRBC BLD AUTO-RTO: 0 /100 WBC (ref 0–0.2)
PLATELET # BLD AUTO: 270 10*3/MM3 (ref 140–450)
PMV BLD AUTO: 11 FL (ref 6–12)
POTASSIUM SERPL-SCNC: 3.7 MMOL/L (ref 3.5–5.2)
PROCALCITONIN SERPL-MCNC: 0.04 NG/ML (ref 0–0.25)
PROT SERPL-MCNC: 7.6 G/DL (ref 6–8.5)
PROTHROMBIN TIME: 12.3 SECONDS (ref 11.9–14.6)
RBC # BLD AUTO: 4.9 10*6/MM3 (ref 3.77–5.28)
SARS-COV-2 RNA RESP QL NAA+PROBE: NOT DETECTED
SODIUM SERPL-SCNC: 135 MMOL/L (ref 136–145)
WBC # BLD AUTO: 7.5 10*3/MM3 (ref 3.4–10.8)

## 2021-10-07 PROCEDURE — 71275 CT ANGIOGRAPHY CHEST: CPT

## 2021-10-07 PROCEDURE — 85610 PROTHROMBIN TIME: CPT | Performed by: NURSE PRACTITIONER

## 2021-10-07 PROCEDURE — 83605 ASSAY OF LACTIC ACID: CPT | Performed by: NURSE PRACTITIONER

## 2021-10-07 PROCEDURE — 87040 BLOOD CULTURE FOR BACTERIA: CPT | Performed by: NURSE PRACTITIONER

## 2021-10-07 PROCEDURE — 87636 SARSCOV2 & INF A&B AMP PRB: CPT | Performed by: NURSE PRACTITIONER

## 2021-10-07 PROCEDURE — 85025 COMPLETE CBC W/AUTO DIFF WBC: CPT | Performed by: NURSE PRACTITIONER

## 2021-10-07 PROCEDURE — C9803 HOPD COVID-19 SPEC COLLECT: HCPCS | Performed by: NURSE PRACTITIONER

## 2021-10-07 PROCEDURE — 86140 C-REACTIVE PROTEIN: CPT | Performed by: NURSE PRACTITIONER

## 2021-10-07 PROCEDURE — 85651 RBC SED RATE NONAUTOMATED: CPT | Performed by: NURSE PRACTITIONER

## 2021-10-07 PROCEDURE — 0 IOPAMIDOL PER 1 ML: Performed by: NURSE PRACTITIONER

## 2021-10-07 PROCEDURE — 63710000001 ONDANSETRON ODT 4 MG TABLET DISPERSIBLE: Performed by: NURSE PRACTITIONER

## 2021-10-07 PROCEDURE — 84145 PROCALCITONIN (PCT): CPT | Performed by: NURSE PRACTITIONER

## 2021-10-07 PROCEDURE — 82728 ASSAY OF FERRITIN: CPT | Performed by: NURSE PRACTITIONER

## 2021-10-07 PROCEDURE — 80053 COMPREHEN METABOLIC PANEL: CPT | Performed by: NURSE PRACTITIONER

## 2021-10-07 PROCEDURE — U0004 COV-19 TEST NON-CDC HGH THRU: HCPCS | Performed by: NURSE PRACTITIONER

## 2021-10-07 PROCEDURE — 83615 LACTATE (LD) (LDH) ENZYME: CPT | Performed by: NURSE PRACTITIONER

## 2021-10-07 PROCEDURE — 99283 EMERGENCY DEPT VISIT LOW MDM: CPT

## 2021-10-07 PROCEDURE — 85379 FIBRIN DEGRADATION QUANT: CPT | Performed by: NURSE PRACTITIONER

## 2021-10-07 PROCEDURE — 71045 X-RAY EXAM CHEST 1 VIEW: CPT

## 2021-10-07 RX ORDER — ONDANSETRON 4 MG/1
4 TABLET, ORALLY DISINTEGRATING ORAL ONCE
Status: COMPLETED | OUTPATIENT
Start: 2021-10-07 | End: 2021-10-07

## 2021-10-07 RX ORDER — IBUPROFEN 400 MG/1
400 TABLET ORAL ONCE
Status: COMPLETED | OUTPATIENT
Start: 2021-10-07 | End: 2021-10-07

## 2021-10-07 RX ORDER — SODIUM CHLORIDE 0.9 % (FLUSH) 0.9 %
10 SYRINGE (ML) INJECTION AS NEEDED
Status: DISCONTINUED | OUTPATIENT
Start: 2021-10-07 | End: 2021-10-08 | Stop reason: HOSPADM

## 2021-10-07 RX ADMIN — SODIUM CHLORIDE, POTASSIUM CHLORIDE, SODIUM LACTATE AND CALCIUM CHLORIDE 1000 ML: 600; 310; 30; 20 INJECTION, SOLUTION INTRAVENOUS at 21:08

## 2021-10-07 RX ADMIN — IBUPROFEN 400 MG: 400 TABLET, FILM COATED ORAL at 20:53

## 2021-10-07 RX ADMIN — ONDANSETRON 4 MG: 4 TABLET, ORALLY DISINTEGRATING ORAL at 21:29

## 2021-10-07 RX ADMIN — IOPAMIDOL 75 ML: 755 INJECTION, SOLUTION INTRAVENOUS at 23:19

## 2021-10-08 VITALS
TEMPERATURE: 98.2 F | SYSTOLIC BLOOD PRESSURE: 127 MMHG | RESPIRATION RATE: 18 BRPM | HEART RATE: 79 BPM | DIASTOLIC BLOOD PRESSURE: 90 MMHG | BODY MASS INDEX: 31.54 KG/M2 | HEIGHT: 63 IN | WEIGHT: 178 LBS | OXYGEN SATURATION: 100 %

## 2021-10-08 LAB
B PARAPERT DNA SPEC QL NAA+PROBE: NOT DETECTED
B PERT DNA SPEC QL NAA+PROBE: NOT DETECTED
C PNEUM DNA NPH QL NAA+NON-PROBE: NOT DETECTED
FLUAV SUBTYP SPEC NAA+PROBE: NOT DETECTED
FLUBV RNA ISLT QL NAA+PROBE: NOT DETECTED
HADV DNA SPEC NAA+PROBE: NOT DETECTED
HCOV 229E RNA SPEC QL NAA+PROBE: NOT DETECTED
HCOV HKU1 RNA SPEC QL NAA+PROBE: NOT DETECTED
HCOV NL63 RNA SPEC QL NAA+PROBE: NOT DETECTED
HCOV OC43 RNA SPEC QL NAA+PROBE: NOT DETECTED
HMPV RNA NPH QL NAA+NON-PROBE: DETECTED
HPIV1 RNA SPEC QL NAA+PROBE: NOT DETECTED
HPIV2 RNA SPEC QL NAA+PROBE: NOT DETECTED
HPIV3 RNA NPH QL NAA+PROBE: NOT DETECTED
HPIV4 P GENE NPH QL NAA+PROBE: NOT DETECTED
M PNEUMO IGG SER IA-ACNC: NOT DETECTED
RHINOVIRUS RNA SPEC NAA+PROBE: NOT DETECTED
RSV RNA NPH QL NAA+NON-PROBE: NOT DETECTED
SARS-COV-2 ORF1AB RESP QL NAA+PROBE: NOT DETECTED
SARS-COV-2 RNA NPH QL NAA+NON-PROBE: NOT DETECTED

## 2021-10-08 PROCEDURE — C9803 HOPD COVID-19 SPEC COLLECT: HCPCS | Performed by: NURSE PRACTITIONER

## 2021-10-08 PROCEDURE — 0202U NFCT DS 22 TRGT SARS-COV-2: CPT | Performed by: NURSE PRACTITIONER

## 2021-10-08 RX ORDER — DOXYCYCLINE 100 MG/1
100 CAPSULE ORAL 2 TIMES DAILY
Qty: 20 CAPSULE | Refills: 0 | Status: SHIPPED | OUTPATIENT
Start: 2021-10-08 | End: 2021-10-18

## 2021-10-08 NOTE — ED PROVIDER NOTES
Subjective   40 yof presents with c/o cough, covid exposure, fever and body aches.  She states she has been having symptoms for several days.   She noticed the cough and body aches on Friday.  She was seen Monday and had a chest xray and covid test as well as flu a and b.  She states she started running a fever at that time.  She has continued to have an intermittent fever since that time.  She states she had an additional covid test yesterday that was also negative.  She states she has seizures and DANIEL Nielsen her neurology PA-C instructed her to come for an evaluation.  She states being ill lowers her seizure threshold and she recently got her license back. She does not want to jeopardize losing her license. She denies CP or SOB but states she has pain when taking a deep breath.          Review of Systems   Constitutional: Positive for fever. Negative for activity change, appetite change and fatigue.   HENT: Negative for congestion, ear pain, facial swelling and sore throat.    Eyes: Negative for discharge and visual disturbance.   Respiratory: Positive for cough. Negative for apnea, chest tightness, shortness of breath, wheezing and stridor.    Cardiovascular: Negative for chest pain and palpitations.   Gastrointestinal: Negative for abdominal distention, abdominal pain, diarrhea, nausea and vomiting.   Genitourinary: Negative for difficulty urinating and dysuria.   Musculoskeletal: Negative for arthralgias and myalgias.   Skin: Negative for rash and wound.   Neurological: Negative for dizziness and seizures.   Psychiatric/Behavioral: Negative for agitation and confusion.       Past Medical History:   Diagnosis Date   • Cerebral aneurysm    • GERD (gastroesophageal reflux disease)    • H/O: hysterectomy    • Hypertension    • Iron deficiency anemia    • Seizure (HCC)        Allergies   Allergen Reactions   • Infed [Iron Dextran] Anaphylaxis     Throat tightness   • Hydromorphone Hcl Itching   • [place] Butter Hives      Allergic to rafiq fruit    • Ampicillin Rash   • Lamotrigine Rash   • Latex Rash   • Lortab [Hydrocodone-Acetaminophen] Rash   • Morphine Rash   • Nitrofurantoin Rash   • Nuts Rash   • Poison Ivy Extract Rash       Past Surgical History:   Procedure Laterality Date   • APPENDECTOMY     •  SECTION      x2   • CHOLECYSTECTOMY     • COLONOSCOPY     • CRANIOTOMY     • ENDOMETRIAL ABLATION     • HYSTERECTOMY         Family History   Problem Relation Age of Onset   • Rheum arthritis Mother    • No Known Problems Brother    • No Known Problems Sister    • No Known Problems Daughter    • No Known Problems Brother    • No Known Problems Brother    • No Known Problems Brother    • No Known Problems Daughter    • Breast cancer Maternal Great-Grandmother        Social History     Socioeconomic History   • Marital status: Single   Tobacco Use   • Smoking status: Never Smoker   • Smokeless tobacco: Never Used   Substance and Sexual Activity   • Alcohol use: No   • Drug use: No   • Sexual activity: Yes     Partners: Male           Objective   Physical Exam  Vitals and nursing note reviewed.   Constitutional:       Appearance: She is well-developed.   HENT:      Head: Normocephalic.      Nose: Nose normal.   Eyes:      Pupils: Pupils are equal, round, and reactive to light.   Cardiovascular:      Rate and Rhythm: Normal rate and regular rhythm.      Heart sounds: No murmur heard.      Pulmonary:      Effort: Pulmonary effort is normal.      Breath sounds: Normal breath sounds.   Abdominal:      General: Bowel sounds are normal.      Palpations: Abdomen is soft.   Musculoskeletal:         General: Normal range of motion.      Cervical back: Normal range of motion and neck supple.   Skin:     General: Skin is warm and dry.   Neurological:      Mental Status: She is alert and oriented to person, place, and time.         Procedures           ED Course  ED Course as of 10/12/21 1510   Fri Oct 08, 2021   0031 I reviewed the  patient's history, assessment and testing results with Dr Courtney.  We will add a complete respiratory panel to her lab work.  We will also treat her for pneumonia based on her CTA.  I then discussed the patient's testing results as well as treatment plan.  She voiced understanding of both.  She voiced understanding she can call for the respiratory panel results as she is ready to be discharged home.  [KS]   0050 Repeat HR 70, T98.2, sp02 100%, /90.  [KS]      ED Course User Index  [KS] Shakila Miller, APRN                                           MDM  Number of Diagnoses or Management Options  Pneumonia due to infectious organism, unspecified laterality, unspecified part of lung: minor     Amount and/or Complexity of Data Reviewed  Clinical lab tests: reviewed    Risk of Complications, Morbidity, and/or Mortality  Presenting problems: minimal  Diagnostic procedures: minimal  Management options: minimal    Patient Progress  Patient progress: stable      Final diagnoses:   Pneumonia due to infectious organism, unspecified laterality, unspecified part of lung       ED Disposition  ED Disposition     ED Disposition Condition Comment    Discharge Stable           James Cuevas MD  [address] [address] [phone]    Schedule an appointment as soon as possible for a visit today  Routine ED follow up         Medication List      New Prescriptions    doxycycline 100 MG capsule  Commonly known as: MONODOX  Take 1 capsule by mouth 2 (Two) Times a Day for 10 days.           Where to Get Your Medications      These medications were sent to [facility]/pharmacy #[id number] - [place], [place] - [address] AT ACROSS FROM [address] - [phone] [facility] [phone]   [address] [facility] [address]    Hours: 24-hours Phone: [phone]   · doxycycline 100 MG capsule          Shakila Miller, ARA  10/12/21 7483

## 2021-10-08 NOTE — DISCHARGE INSTRUCTIONS
Drink plenty of fluid.  Continue previously prescribed medication. New medication as ordered.  Tylenol as needed for pain/fever. Follow up with Pcp - call today for appointment. Return to ED if condition does not improve or worsens

## 2021-10-12 LAB
BACTERIA SPEC AEROBE CULT: NORMAL
BACTERIA SPEC AEROBE CULT: NORMAL

## 2021-10-15 ENCOUNTER — PATIENT OUTREACH (OUTPATIENT)
Dept: CASE MANAGEMENT | Facility: OTHER | Age: 41
End: 2021-10-15

## 2021-10-15 NOTE — OUTREACH NOTE
"Patient Outreach    I contacted Ms Lerner to introduce the ECM program.  I had planned to introduce the [employer] program to her, and review some of her benefits that are available through her [facility] insurance.  She was extremely displeased that I had contacted her.  She would not allow me to explain why I was calling or provide her with the information she needed to validate my identity or the ECM program. I explained that I was also an employee of [facility], I apologized for the call and advised her I would not call her again.      If contacted by anyone in the future, she would like to be addressed as \"ma'am\".  Manager informed of phone call.      Tonia MANN, RN, [facility]   [place] [address]     [phone] cell   [phone] office  [phone] fax  [email or web address]  [facility]  "

## 2021-10-17 ENCOUNTER — HOSPITAL ENCOUNTER (EMERGENCY)
Facility: HOSPITAL | Age: 41
Discharge: HOME | End: 2021-10-17
Admitting: EMERGENCY MEDICINE

## 2021-10-17 ENCOUNTER — APPOINTMENT (OUTPATIENT)
Dept: GENERAL RADIOLOGY | Facility: HOSPITAL | Age: 41
End: 2021-10-17

## 2021-10-17 VITALS
HEART RATE: 77 BPM | HEIGHT: 62 IN | BODY MASS INDEX: 33.13 KG/M2 | DIASTOLIC BLOOD PRESSURE: 80 MMHG | TEMPERATURE: 98.4 F | SYSTOLIC BLOOD PRESSURE: 125 MMHG | OXYGEN SATURATION: 99 % | RESPIRATION RATE: 20 BRPM | WEIGHT: 180 LBS

## 2021-10-17 DIAGNOSIS — R06.02 SOB (SHORTNESS OF BREATH): ICD-10-CM

## 2021-10-17 DIAGNOSIS — J06.9 VIRAL URI WITH COUGH: Primary | ICD-10-CM

## 2021-10-17 LAB
ALBUMIN SERPL-MCNC: 4.2 G/DL (ref 3.5–5.2)
ALBUMIN/GLOB SERPL: 1.4 G/DL
ALP SERPL-CCNC: 79 U/L (ref 39–117)
ALT SERPL W P-5'-P-CCNC: 13 U/L (ref 1–33)
ANION GAP SERPL CALCULATED.3IONS-SCNC: 10 MMOL/L (ref 5–15)
ARTERIAL PATENCY WRIST A: POSITIVE
AST SERPL-CCNC: 15 U/L (ref 1–32)
ATMOSPHERIC PRESS: 756 MMHG
B PARAPERT DNA SPEC QL NAA+PROBE: NOT DETECTED
B PERT DNA SPEC QL NAA+PROBE: NOT DETECTED
BASE EXCESS BLDA CALC-SCNC: -3 MMOL/L (ref 0–2)
BASOPHILS # BLD AUTO: 0.06 10*3/MM3 (ref 0–0.2)
BASOPHILS NFR BLD AUTO: 0.6 % (ref 0–1.5)
BDY SITE: ABNORMAL
BILIRUB SERPL-MCNC: 0.4 MG/DL (ref 0–1.2)
BODY TEMPERATURE: 37 C
BUN SERPL-MCNC: 16 MG/DL (ref 6–20)
BUN/CREAT SERPL: 22.9 (ref 7–25)
C PNEUM DNA NPH QL NAA+NON-PROBE: NOT DETECTED
CALCIUM SPEC-SCNC: 9.1 MG/DL (ref 8.6–10.5)
CHLORIDE SERPL-SCNC: 106 MMOL/L (ref 98–107)
CO2 SERPL-SCNC: 23 MMOL/L (ref 22–29)
CREAT SERPL-MCNC: 0.7 MG/DL (ref 0.57–1)
D DIMER PPP FEU-MCNC: 0.54 MG/L (FEU) (ref 0–0.5)
D-LACTATE SERPL-SCNC: 1.6 MMOL/L (ref 0.5–2)
DEPRECATED RDW RBC AUTO: 49.6 FL (ref 37–54)
EOSINOPHIL # BLD AUTO: 0.38 10*3/MM3 (ref 0–0.4)
EOSINOPHIL NFR BLD AUTO: 4.1 % (ref 0.3–6.2)
ERYTHROCYTE [DISTWIDTH] IN BLOOD BY AUTOMATED COUNT: 15.7 % (ref 12.3–15.4)
FLUAV SUBTYP SPEC NAA+PROBE: NOT DETECTED
FLUBV RNA ISLT QL NAA+PROBE: NOT DETECTED
GFR SERPL CREATININE-BSD FRML MDRD: 112 ML/MIN/1.73
GLOBULIN UR ELPH-MCNC: 3.1 GM/DL
GLUCOSE SERPL-MCNC: 101 MG/DL (ref 65–99)
HADV DNA SPEC NAA+PROBE: NOT DETECTED
HCO3 BLDA-SCNC: 20.9 MMOL/L (ref 20–26)
HCOV 229E RNA SPEC QL NAA+PROBE: NOT DETECTED
HCOV HKU1 RNA SPEC QL NAA+PROBE: NOT DETECTED
HCOV NL63 RNA SPEC QL NAA+PROBE: NOT DETECTED
HCOV OC43 RNA SPEC QL NAA+PROBE: NOT DETECTED
HCT VFR BLD AUTO: 39.6 % (ref 34–46.6)
HGB BLD-MCNC: 12.5 G/DL (ref 12–15.9)
HMPV RNA NPH QL NAA+NON-PROBE: NOT DETECTED
HPIV1 RNA SPEC QL NAA+PROBE: NOT DETECTED
HPIV2 RNA SPEC QL NAA+PROBE: NOT DETECTED
HPIV3 RNA NPH QL NAA+PROBE: NOT DETECTED
HPIV4 P GENE NPH QL NAA+PROBE: NOT DETECTED
IMM GRANULOCYTES # BLD AUTO: 0.05 10*3/MM3 (ref 0–0.05)
IMM GRANULOCYTES NFR BLD AUTO: 0.5 % (ref 0–0.5)
LYMPHOCYTES # BLD AUTO: 2.67 10*3/MM3 (ref 0.7–3.1)
LYMPHOCYTES NFR BLD AUTO: 28.8 % (ref 19.6–45.3)
Lab: ABNORMAL
M PNEUMO IGG SER IA-ACNC: NOT DETECTED
MAGNESIUM SERPL-MCNC: 2 MG/DL (ref 1.6–2.6)
MCH RBC QN AUTO: 27.1 PG (ref 26.6–33)
MCHC RBC AUTO-ENTMCNC: 31.6 G/DL (ref 31.5–35.7)
MCV RBC AUTO: 85.9 FL (ref 79–97)
MODALITY: ABNORMAL
MONOCYTES # BLD AUTO: 0.54 10*3/MM3 (ref 0.1–0.9)
MONOCYTES NFR BLD AUTO: 5.8 % (ref 5–12)
NEUTROPHILS NFR BLD AUTO: 5.58 10*3/MM3 (ref 1.7–7)
NEUTROPHILS NFR BLD AUTO: 60.2 % (ref 42.7–76)
NRBC BLD AUTO-RTO: 0 /100 WBC (ref 0–0.2)
NT-PROBNP SERPL-MCNC: 103.9 PG/ML (ref 0–450)
PCO2 BLDA: 33.1 MM HG (ref 35–45)
PCO2 TEMP ADJ BLD: 33.1 MM HG (ref 35–45)
PH BLDA: 7.41 PH UNITS (ref 7.35–7.45)
PH, TEMP CORRECTED: 7.41 PH UNITS (ref 7.35–7.45)
PLATELET # BLD AUTO: 304 10*3/MM3 (ref 140–450)
PMV BLD AUTO: 10.3 FL (ref 6–12)
PO2 BLDA: 112 MM HG (ref 83–108)
PO2 TEMP ADJ BLD: 112 MM HG (ref 83–108)
POTASSIUM SERPL-SCNC: 3.9 MMOL/L (ref 3.5–5.2)
PROCALCITONIN SERPL-MCNC: 0.05 NG/ML (ref 0–0.25)
PROT SERPL-MCNC: 7.3 G/DL (ref 6–8.5)
RBC # BLD AUTO: 4.61 10*6/MM3 (ref 3.77–5.28)
RHINOVIRUS RNA SPEC NAA+PROBE: NOT DETECTED
RSV RNA NPH QL NAA+NON-PROBE: NOT DETECTED
SAO2 % BLDCOA: 99.2 % (ref 94–99)
SARS-COV-2 RNA NPH QL NAA+NON-PROBE: NOT DETECTED
SODIUM SERPL-SCNC: 139 MMOL/L (ref 136–145)
TROPONIN T SERPL-MCNC: <0.01 NG/ML (ref 0–0.03)
VENTILATOR MODE: ABNORMAL
WBC # BLD AUTO: 9.28 10*3/MM3 (ref 3.4–10.8)

## 2021-10-17 PROCEDURE — 85379 FIBRIN DEGRADATION QUANT: CPT | Performed by: PHYSICIAN ASSISTANT

## 2021-10-17 PROCEDURE — 82803 BLOOD GASES ANY COMBINATION: CPT

## 2021-10-17 PROCEDURE — 71045 X-RAY EXAM CHEST 1 VIEW: CPT

## 2021-10-17 PROCEDURE — 83880 ASSAY OF NATRIURETIC PEPTIDE: CPT | Performed by: PHYSICIAN ASSISTANT

## 2021-10-17 PROCEDURE — 84484 ASSAY OF TROPONIN QUANT: CPT | Performed by: PHYSICIAN ASSISTANT

## 2021-10-17 PROCEDURE — 85025 COMPLETE CBC W/AUTO DIFF WBC: CPT | Performed by: PHYSICIAN ASSISTANT

## 2021-10-17 PROCEDURE — 80053 COMPREHEN METABOLIC PANEL: CPT | Performed by: PHYSICIAN ASSISTANT

## 2021-10-17 PROCEDURE — 83605 ASSAY OF LACTIC ACID: CPT | Performed by: PHYSICIAN ASSISTANT

## 2021-10-17 PROCEDURE — 0202U NFCT DS 22 TRGT SARS-COV-2: CPT | Performed by: PHYSICIAN ASSISTANT

## 2021-10-17 PROCEDURE — 83735 ASSAY OF MAGNESIUM: CPT | Performed by: PHYSICIAN ASSISTANT

## 2021-10-17 PROCEDURE — 84145 PROCALCITONIN (PCT): CPT | Performed by: PHYSICIAN ASSISTANT

## 2021-10-17 PROCEDURE — 36600 WITHDRAWAL OF ARTERIAL BLOOD: CPT

## 2021-10-17 PROCEDURE — 87040 BLOOD CULTURE FOR BACTERIA: CPT | Performed by: PHYSICIAN ASSISTANT

## 2021-10-17 PROCEDURE — 99283 EMERGENCY DEPT VISIT LOW MDM: CPT

## 2021-10-17 RX ORDER — BENZONATATE 100 MG/1
100 CAPSULE ORAL 2 TIMES DAILY PRN
Qty: 20 CAPSULE | Refills: 0 | Status: SHIPPED | OUTPATIENT
Start: 2021-10-17 | End: 2022-12-28

## 2021-10-17 RX ORDER — IPRATROPIUM BROMIDE AND ALBUTEROL SULFATE 2.5; .5 MG/3ML; MG/3ML
3 SOLUTION RESPIRATORY (INHALATION) ONCE
Status: DISCONTINUED | OUTPATIENT
Start: 2021-10-17 | End: 2021-10-17 | Stop reason: HOSPADM

## 2021-10-17 RX ORDER — SODIUM CHLORIDE 0.9 % (FLUSH) 0.9 %
10 SYRINGE (ML) INJECTION AS NEEDED
Status: DISCONTINUED | OUTPATIENT
Start: 2021-10-17 | End: 2021-10-17 | Stop reason: HOSPADM

## 2021-10-17 NOTE — DISCHARGE INSTRUCTIONS
As we discussed today you tested negative for all of the respiratory viruses, negative for COVID-19.  You have no evidence of pneumonia.  Please continue to treat yourself symptomatically with rest, hydration, use of Mucinex/Flonase/Benadryl, use of the Tessalon Perles.  Please have follow-up with your primary care provider for continued reevaluation's.  Should you develop any new or worsening symptoms please return to the ER for further evaluation.        Shortness of Breath, Adult  Shortness of breath is when a person has trouble breathing enough air or when a person feels like she or he is having trouble breathing in enough air. Shortness of breath could be a sign of a medical problem.  Follow these instructions at home:    · Pay attention to any changes in your symptoms.  · Do not use any products that contain nicotine or tobacco, such as cigarettes, e-cigarettes, and chewing tobacco.  · Do not smoke. Smoking is a common cause of shortness of breath. If you need help quitting, ask your health care provider.  · Avoid things that can irritate your airways, such as:  ? Mold.  ? Dust.  ? Air pollution.  ? Chemical fumes.  ? Things that can cause allergy symptoms (allergens), if you have allergies.  · Keep your living space clean and free of mold and dust.  · Rest as needed. Slowly return to your usual activities.  · Take over-the-counter and prescription medicines only as told by your health care provider. This includes oxygen therapy and inhaled medicines.  · Keep all follow-up visits as told by your health care provider. This is important.  Contact a health care provider if:  · Your condition does not improve as soon as expected.  · You have a hard time doing your normal activities, even after you rest.  · You have new symptoms.  Get help right away if:  · Your shortness of breath gets worse.  · You have shortness of breath when you are resting.  · You feel light-headed or you faint.  · You have a cough that is  not controlled with medicines.  · You cough up blood.  · You have pain with breathing.  · You have pain in your chest, arms, shoulders, or abdomen.  · You have a fever.  · You cannot walk up stairs or exercise the way that you normally do.  These symptoms may represent a serious problem that is an emergency. Do not wait to see if the symptoms will go away. Get medical help right away. Call your local emergency services (911 in the [place] Do not drive yourself to the hospital.  Summary  · Shortness of breath is when a person has trouble breathing enough air. It can be a sign of a medical problem.  · Avoid things that irritate your lungs, such as smoking, pollution, mold, and dust.  · Pay attention to changes in your symptoms and contact your health care provider if you have a hard time completing daily activities because of shortness of breath.  This information is not intended to replace advice given to you by your health care provider. Make sure you discuss any questions you have with your health care provider.  Document Revised: 05/20/2019 Document Reviewed: 05/20/2019  [employer] Patient Education © 2021 [employer]      Viral Respiratory Infection  A respiratory infection is an illness that affects part of the respiratory system, such as the lungs, nose, or throat. A respiratory infection that is caused by a virus is called a viral respiratory infection.  Common types of viral respiratory infections include:  · A cold.  · The flu (influenza).  · A respiratory syncytial virus (RSV) infection.  What are the causes?  This condition is caused by a virus.  What are the signs or symptoms?  Symptoms of this condition include:  · A stuffy or runny nose.  · Yellow or green nasal discharge.  · A cough.  · Sneezing.  · Fatigue.  · Achy muscles.  · A sore throat.  · Sweating or chills.  · A fever.  · A headache.  How is this diagnosed?  This condition may be diagnosed based on:  · Your symptoms.  · A physical  exam.  · Testing of nasal swabs.  How is this treated?  This condition may be treated with medicines, such as:  · Antiviral medicine. This may shorten the length of time a person has symptoms.  · Expectorants. These make it easier to cough up mucus.  · Decongestant nasal sprays.  · Acetaminophen or NSAIDs to relieve fever and pain.  Antibiotic medicines are not prescribed for viral infections. This is because antibiotics are designed to kill bacteria. They are not effective against viruses.  Follow these instructions at home:    Managing pain and congestion  · Take over-the-counter and prescription medicines only as told by your health care provider.  · If you have a sore throat, gargle with a salt-water mixture 3-4 times a day or as needed. To make a salt-water mixture, completely dissolve ½-1 tsp of salt in 1 cup of warm water.  · Use nose drops made from salt water to ease congestion and soften raw skin around your nose.  · Drink enough fluid to keep your urine pale yellow. This helps prevent dehydration and helps loosen up mucus.  General instructions  · Rest as much as possible.  · Do not drink alcohol.  · Do not use any products that contain nicotine or tobacco, such as cigarettes and e-cigarettes. If you need help quitting, ask your health care provider.  · Keep all follow-up visits as told by your health care provider. This is important.  How is this prevented?    · Get an annual flu shot. You may get the flu shot in late summer, fall, or winter. Ask your health care provider when you should get your flu shot.  · Avoid exposing others to your respiratory infection.  ? Stay home from work or school as told by your health care provider.  ? Wash your hands with soap and water often, especially after you cough or sneeze. If soap and water are not available, use alcohol-based hand .  · Avoid contact with people who are sick during cold and flu season. This is generally fall and winter.  Contact a health  care provider if:  · Your symptoms last for 10 days or longer.  · Your symptoms get worse over time.  · You have a fever.  · You have severe sinus pain in your face or forehead.  · The glands in your jaw or neck become very swollen.  Get help right away if you:  · Feel pain or pressure in your chest.  · Have shortness of breath.  · Faint or feel like you will faint.  · Have severe and persistent vomiting.  · Feel confused or disoriented.  Summary  · A respiratory infection is an illness that affects part of the respiratory system, such as the lungs, nose, or throat. A respiratory infection that is caused by a virus is called a viral respiratory infection.  · Common types of viral respiratory infections are a cold, influenza, and respiratory syncytial virus (RSV) infection.  · Symptoms of this condition include a stuffy or runny nose, cough, sneezing, fatigue, achy muscles, sore throat, and fevers or chills.  · Antibiotic medicines are not prescribed for viral infections. This is because antibiotics are designed to kill bacteria. They are not effective against viruses.  This information is not intended to replace advice given to you by your health care provider. Make sure you discuss any questions you have with your health care provider.  Document Revised: 12/26/2019 Document Reviewed: 01/28/2019  [employer] Patient Education © 2021 [employer]

## 2021-10-17 NOTE — ED PROVIDER NOTES
"Subjective   History of Present Illness    Patient is a 40-year-old female presenting to ED for return to work clearance.  PMH significant for history cerebral aneurysm, epilepsy, iron deficiency anemia, GERD.  Patient states around 10/3/21 she began developing chest pressure, cough, at which time she presented and was diagnosed with human [place] pneumo virus.  Patient reports she was given a new inhaler and after that she then developed fevers as well as continued productive cough.  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  Patient reports that she still feels like \"I am breathing through a wet sponge and last night I had difficulty getting my air in as well as out.\"  Patient denies any fevers but reports she is still having intermittent chills.  Patient reports discomfort in her chest upon coughing but denies any other chest pain or palpitations.  Patient denies nausea, vomiting, diarrhea, abdominal pain, or myalgias.  Patient denies any other known recent sick contacts.  Patient presents for further evaluation as well as clearance to return to work.     Records records showed patient was last seen in ED on 10/7/2021 for pneumonia. Patient had a chest CTA which showed: No evidence of PE, left lower lobe bronchopneumonia, diffuse bronchial wall thickening, no evidence of effusion, mildly enlarged left axillary nodes.  Respiratory panel positive for human metapneumovirus.  Covid testing negative.  Patient was sent home with a prescription for doxycycline.    Patient with multiple negative COVID tests from 10/6/21 to 10/8/21.    Review of " Systems   Constitutional: Positive for chills. Negative for fever.   HENT: Negative.    Eyes: Negative.    Respiratory: Positive for cough, chest tightness and shortness of breath.    Cardiovascular: Negative.  Negative for chest pain and palpitations.   Gastrointestinal: Negative.  Negative for abdominal pain, diarrhea, nausea and vomiting.   Genitourinary: Negative.    Musculoskeletal: Negative.  Negative for myalgias.   Skin: Negative.    Neurological: Negative.  Negative for dizziness and headaches.   Psychiatric/Behavioral: Negative.    All other systems reviewed and are negative.      Past Medical History:   Diagnosis Date   • Cerebral aneurysm    • GERD (gastroesophageal reflux disease)    • H/O: hysterectomy    • Hypertension    • Iron deficiency anemia    • Seizure (HCC)        Allergies   Allergen Reactions   • Infed [Iron Dextran] Anaphylaxis     Throat tightness   • Hydromorphone Hcl Itching   • Rafiq Butter Hives     Allergic to rafiq fruit    • Ampicillin Rash   • Lamotrigine Rash   • Latex Rash   • Lortab [Hydrocodone-Acetaminophen] Rash   • Morphine Rash   • Nitrofurantoin Rash   • Nuts Rash   • Poison Ivy Extract Rash       Past Surgical History:   Procedure Laterality Date   • APPENDECTOMY     •  SECTION      x2   • CHOLECYSTECTOMY     • COLONOSCOPY     • CRANIOTOMY     • ENDOMETRIAL ABLATION     • HYSTERECTOMY         Family History   Problem Relation Age of Onset   • Rheum arthritis Mother    • No Known Problems Brother    • No Known Problems Sister    • No Known Problems Daughter    • No Known Problems Brother    • No Known Problems Brother    • No Known Problems Brother    • No Known Problems Daughter    • Breast cancer Maternal Great-Grandmother        Social History     Socioeconomic History   • Marital status: Single   Tobacco Use   • Smoking status: Never Smoker   • Smokeless tobacco: Never Used   Substance and Sexual Activity   • Alcohol use: No   • Drug use: No   • Sexual  activity: Yes     Partners: Male           Objective   Physical Exam  Vitals and nursing note reviewed.   Constitutional:       General: She is not in acute distress.     Appearance: Normal appearance. She is well-developed and well-groomed. She is not toxic-appearing or diaphoretic.   HENT:      Head: Normocephalic.      Mouth/Throat:      Mouth: Mucous membranes are moist.      Pharynx: Oropharynx is clear. Posterior oropharyngeal erythema present. No oropharyngeal exudate.   Eyes:      Extraocular Movements: Extraocular movements intact.      Conjunctiva/sclera: Conjunctivae normal.      Pupils: Pupils are equal, round, and reactive to light.   Cardiovascular:      Rate and Rhythm: Tachycardia present.   Pulmonary:      Effort: Pulmonary effort is normal. No respiratory distress.      Breath sounds: Wheezing (Expiratory, throughout) present. No rhonchi or rales.   Chest:      Chest wall: No tenderness.   Abdominal:      General: Bowel sounds are normal.      Palpations: Abdomen is soft.      Tenderness: There is no abdominal tenderness.   Musculoskeletal:         General: Normal range of motion.      Cervical back: Normal range of motion and neck supple.      Right lower leg: No edema.      Left lower leg: No edema.   Skin:     General: Skin is warm and dry.      Findings: No rash or wound.   Neurological:      Mental Status: She is alert and oriented to person, place, and time.   Psychiatric:         Attention and Perception: Attention normal.         Mood and Affect: Mood and affect normal.         Speech: Speech normal.         Behavior: Behavior normal. Behavior is cooperative.         Procedures           ED Course                                           MDM  Number of Diagnoses or Management Options     Amount and/or Complexity of Data Reviewed  Clinical lab tests: reviewed and ordered  Tests in the radiology section of CPT®: reviewed and ordered  Tests in the medicine section of CPT®: ordered and  reviewed  Decide to obtain previous medical records or to obtain history from someone other than the patient: yes  Review and summarize past medical records: yes  Discuss the patient with other providers: yes (Dr. Panfilo Pendleton (attending))    Patient Progress  Patient progress: improved    Patient is a 40-year-old female presenting to ED for return to work clearance.  PMH significant for history cerebral aneurysm, epilepsy, iron deficiency anemia, GERD.  CBC, CMP, magnesium, BNP, lactic acid, troponin, procalcitonin all unremarkable.  COVID-19 testing negative.  Respiratory panel negative.  D-dimer 0.54 which is improved from 5 days ago.  ABG with evidence of hypoventilation with PO2 of 112.  PCO2 decreased at 33.1.  pH WNL at 7.409, HCO3 WNL at 20.9. Chest x-ray showed: No focal acute lung disease.  Patient saturated well at room air at %.  Patient's initial hypotensive state improved to 125/80.  Patient remained afebrile with no tachycardia.  Discussed with patient need for continued symptomatic treatment of upper respiratory symptoms with rest, hydration, as well as decongestions and Tessalon Perles.  Discussed need for continued outpatient follow-up to assure continued improvement in her shortness of breath as well as cough.  Advised of strict return precautions any for me to return to ED should she develop any new or worsening symptoms.  Patient very appreciative with no further questions, concerns, needs at this time and is stable for discharge.  Case discussed with Dr. Panfilo Pendleton who is in agreement with no further recommendations.    Final diagnoses:   Viral URI with cough   SOB (shortness of breath)       ED Disposition  ED Disposition     ED Disposition Condition Comment    Discharge Stable           James Cuevas MD  [address] [address]  [phone]    Schedule an appointment as soon as possible for a visit in 2 days      [facility] Emergency  Department  [address] [place] [id number] [phone]    As needed         Medication List      New Prescriptions    benzonatate 100 MG capsule  Commonly known as: TESSALON  Take 1 capsule by mouth 3 (Three) Times a Day As Needed for Cough.           Where to Get Your Medications      These medications were sent to [facility]/pharmacy #[id number] - [place], [place] - [address] AT ACROSS FROM [address]  [phone] [facility] [phone]   [address] [facility] [address]    Hours: 24-hours Phone: [phone]   · benzonatate 100 MG capsule          Virgilio Lorenzo PA-C  10/17/21 8705

## 2021-10-22 LAB
BACTERIA SPEC AEROBE CULT: NORMAL
BACTERIA SPEC AEROBE CULT: NORMAL

## 2021-12-20 DIAGNOSIS — G43.709 CHRONIC MIGRAINE WITHOUT AURA WITHOUT STATUS MIGRAINOSUS, NOT INTRACTABLE: ICD-10-CM

## 2022-02-04 ENCOUNTER — HOSPITAL ENCOUNTER (EMERGENCY)
Age: 42
Discharge: HOME | End: 2022-02-04
Payer: COMMERCIAL

## 2022-02-04 VITALS
HEART RATE: 78 BPM | RESPIRATION RATE: 16 BRPM | OXYGEN SATURATION: 99 % | SYSTOLIC BLOOD PRESSURE: 136 MMHG | TEMPERATURE: 98.6 F | DIASTOLIC BLOOD PRESSURE: 80 MMHG

## 2022-02-04 DIAGNOSIS — Z20.822 CLOSE EXPOSURE TO COVID-19 VIRUS: Primary | ICD-10-CM

## 2022-02-04 DIAGNOSIS — B34.9 VIRAL SYNDROME: ICD-10-CM

## 2022-02-04 LAB — SARS-COV-2, NAAT: NOT DETECTED

## 2022-02-04 PROCEDURE — 87635 SARS-COV-2 COVID-19 AMP PRB: CPT

## 2022-02-04 PROCEDURE — 99282 EMERGENCY DEPT VISIT SF MDM: CPT

## 2022-02-04 RX ORDER — FLUTICASONE PROPIONATE 50 MCG
1 SPRAY, SUSPENSION (ML) NASAL DAILY
Qty: 16 G | Refills: 0 | Status: SHIPPED | OUTPATIENT
Start: 2022-02-04

## 2022-02-04 RX ORDER — BENZONATATE 100 MG/1
100 CAPSULE ORAL 2 TIMES DAILY PRN
Qty: 20 CAPSULE | Refills: 0 | Status: SHIPPED | OUTPATIENT
Start: 2022-02-04 | End: 2022-02-11

## 2022-02-04 RX ORDER — GUAIFENESIN 600 MG/1
1200 TABLET, EXTENDED RELEASE ORAL 2 TIMES DAILY
Qty: 40 TABLET | Refills: 0 | Status: SHIPPED | OUTPATIENT
Start: 2022-02-04 | End: 2022-02-14

## 2022-02-04 ASSESSMENT — ENCOUNTER SYMPTOMS
DIARRHEA: 0
NAUSEA: 0
ABDOMINAL PAIN: 0
COUGH: 1
VOMITING: 0
SHORTNESS OF BREATH: 0

## 2022-02-04 NOTE — ED PROVIDER NOTES
[facility]  eMERGENCY dEPARTMENT eNCOUnter      Pt Name: Robert Khan  MRN: [id number]  [place] 1980  Date of evaluation: 2/4/2022  Provider: Flory Felipe, [address] [place] [address]       Chief Complaint   Patient presents with    Concern For COVID-19         HISTORY OF PRESENT ILLNESS   (Location/Symptom, Timing/Onset,Context/Setting, Quality, Duration, Modifying Factors, Severity)  Note limiting factors. Robert Khan is a 39 y.o. female with history of anemia, anxiety, depression, chronic back pain, seizures, intracranial aneurysm, and asthma who presents to the emergency department with complaint of COVID exposure. She notes she does have mild symptoms including congestion, ear pain and pressure, and cough. She has been treating herself symptomatically at home. Her mother was diagnosed with covid last night and the patient was at her mother's house helping to care for her all week. She has been vaccinated for COVID and had COVID herself about two months ago. HPI    NursingNotes were reviewed. REVIEW OF SYSTEMS    (2-9 systems for level 4, 10 or more for level 5)     Review of Systems   Constitutional: Negative for chills, fatigue and fever. HENT: Positive for ear pain. Negative for congestion. Respiratory: Positive for cough. Negative for shortness of breath. Cardiovascular: Negative for chest pain. Gastrointestinal: Negative for abdominal pain, diarrhea, nausea and vomiting. Musculoskeletal: Negative for myalgias. Neurological: Negative for dizziness and headaches. All other systems reviewed and are negative.            PAST MEDICALHISTORY     Past Medical History:   Diagnosis Date    Abdominal pain     Acid reflux     Acute bronchitis     Anemia     Anxiety     Asthma     Chronic back pain     Constipation     Depression     Headache(784.0)     Hyperlipidemia     Hypermenorrhea     Insomnia     Intracranial aneurysm     Overweight     Prolonged emergence from general anesthesia     Seizures ([facility] [address]     UTI (urinary tract infection)     Vagina, candidiasis     Vitamin D deficiency          SURGICAL HISTORY       Past Surgical History:   Procedure Laterality Date    APPENDECTOMY      BRAIN SURGERY      Aneurysm repair     SECTION      CHOLECYSTECTOMY      COLONOSCOPY  2015    Dr. Howard Fus      back    HYSTERECTOMY      MT COLONOSCOPY FLX DX W/COLLJ SPEC WHEN PFRMD N/A 2017    Dr Angelene Burkitt, 15 yr (age 48) recall    MT EGD TRANSORAL BIOPSY SINGLE/MULTIPLE N/A 2017    Dr Angelene Burkitt, Samira (-)    UTERINE FIBROID EMBOLIZATION           CURRENT MEDICATIONS     Previous Medications    ALBUTEROL SULFATE (PROAIR HFA IN)    Inhale into the lungs. ESCITALOPRAM (LEXAPRO) 20 MG TABLET        OMEPRAZOLE (PRILOSEC) 20 MG CAPSULE    Take 20 mg by mouth daily.     TOPIRAMATE (TOPAMAX) 25 MG TABLET    Take 100 mg by mouth 2 times daily     VERAPAMIL (VERELAN) 120 MG CR CAPSULE    Take 120 mg by mouth nightly       ALLERGIES     Latex, Ampicillin, Infed [iron dextran], Lortab [hydrocodone-acetaminophen], Macrodantin [nitrofurantoin], Tim butter, Morphine, Poison ivy extract [poison ivy extract], Lamotrigine, and Peanut-containing drug products    FAMILY HISTORY       Family History   Problem Relation Age of Onset    Hypertension Father     High Cholesterol Father     Colon Polyps Sister     Anemia Sister     Colon Polyps Mother     Crohn's Disease Maternal Uncle     Colon Cancer Neg Hx     Esophageal Cancer Neg Hx     Liver Cancer Neg Hx     Rectal Cancer Neg Hx     Stomach Cancer Neg Hx     Liver Disease Neg Hx           SOCIAL HISTORY       Social History     Socioeconomic History    Marital status: Single     Spouse name: None    Number of children: None    Years of education: None    Highest education level: None   Occupational History    None   Tobacco Use    Smoking status: Never Smoker    Smokeless tobacco: Never Used   Vaping Use    Vaping Use: Never used   Substance and Sexual Activity    Alcohol use: No    Drug use: No    Sexual activity: None   Other Topics Concern    None   Social History Narrative    None     Social Determinants of Health     Financial Resource Strain:     Difficulty of Paying Living Expenses: Not on file   Food Insecurity:     Worried About Running Out of Food in the Last Year: Not on file    Sabrina of Food in the Last Year: Not on file   Transportation Needs:     Lack of Transportation (Medical): Not on file    Lack of Transportation (Non-Medical): Not on file   Physical Activity:     Days of Exercise per Week: Not on file    Minutes of Exercise per Session: Not on file   Stress:     Feeling of Stress : Not on file   Social Connections:     Frequency of Communication with Friends and Family: Not on file    Frequency of Social Gatherings with Friends and Family: Not on file    Attends [employer] Services: Not on file    Active Member of [address] [place] [address] or Organizations: Not on file    Attends Club or Organization Meetings: Not on file    Marital Status: Not on file   Intimate Partner Violence:     Fear of Current or Ex-Partner: Not on file    Emotionally Abused: Not on file    Physically Abused: Not on file    Sexually Abused: Not on file   Housing Stability:     Unable to Pay for Housing in the Last Year: Not on file    Number of [place] in the Last Year: Not on file    Unstable Housing in the Last Year: Not on file       SCREENINGS             PHYSICAL EXAM    (up to 7 for level 4, 8 or more for level 5)     ED Triage Vitals [02/04/22 0855]   BP Temp Temp Source Pulse Resp SpO2 Height Weight   136/80 98.6 °F (37 °C) Oral 78 16 99 % -- --       Physical Exam  Vitals and nursing note reviewed. Constitutional:       General: She is not in acute distress. Appearance: Normal appearance. She is normal weight. She is not ill-appearing or toxic-appearing. HENT:      Head: Normocephalic and atraumatic. Right Ear: Ear canal and external ear normal. There is no impacted cerumen. Left Ear: Ear canal and external ear normal. There is no impacted cerumen. Ears:      Comments: Fluid noted behind TM bilaterally, worse on right side, no associated bulging or redness of TM     Nose: Nose normal. No congestion or rhinorrhea. Mouth/Throat:      Mouth: Mucous membranes are moist.      Pharynx: No oropharyngeal exudate or posterior oropharyngeal erythema. Eyes:      Extraocular Movements: Extraocular movements intact. Conjunctiva/sclera: Conjunctivae normal.      Pupils: Pupils are equal, round, and reactive to light. Cardiovascular:      Rate and Rhythm: Normal rate and regular rhythm. Pulses: Normal pulses. Heart sounds: Normal heart sounds. Pulmonary:      Effort: Pulmonary effort is normal. No respiratory distress. Breath sounds: Normal breath sounds. Chest:      Chest wall: No tenderness. Abdominal:      Palpations: Abdomen is soft. Tenderness: There is no abdominal tenderness. Musculoskeletal:      Cervical back: Normal range of motion and neck supple. No rigidity or tenderness. Lymphadenopathy:      Cervical: No cervical adenopathy. Skin:     General: Skin is warm and dry. Neurological:      General: No focal deficit present. Mental Status: She is alert and oriented to person, place, and time. Psychiatric:         Mood and Affect: Mood normal.         Behavior: Behavior normal.         DIAGNOSTIC RESULTS     LABS:  Labs Reviewed   COVID-19, RAPID       All other labs were within normal range or not returned as of this dictation.     EMERGENCY DEPARTMENT COURSE and DIFFERENTIAL DIAGNOSIS/MDM:   Vitals:    Vitals:    02/04/22 0855   BP: 136/80   Pulse: 78   Resp: 16   Temp: 98.6 °F (37 °C)   TempSrc: Oral   SpO2: 99%       MDM  Patient is a 51-year-old female presents with complaint of COVID exposure and mild upper respiratory symptoms. Her vital signs are within normal limits and I do not suspect sepsis. Her physical exam is not remarkable for any labored breathing, active coughing, or abnormal lung sounds on chest auscultation that would warrant chest imaging. She has no complaints of severe symptoms including chest pain or shortness of breath that would warrant further work-up at this time. Her COVID is negative. Due to her exposure and mild symptoms, I do plan to treat her like she is positive. Encouraged a 5-day isolation as well as symptom control of her suspected viral etiology. She is agreeable to this plan. Return precautions were given to her and she verbalized understanding. All of her questions were answered. She has no indication for inpatient placement at this time and is safe to be discharged    FINAL IMPRESSION      1. Close exposure to COVID-19 virus    2. Viral syndrome          DISPOSITION/PLAN   DISPOSITION Decision To Discharge 02/04/2022 10:31:38 AM      PATIENT REFERRED TO:  No follow-up provider specified.     DISCHARGE MEDICATIONS:  New Prescriptions    BENZONATATE (TESSALON) 100 MG CAPSULE    Take 1 capsule by mouth 2 times daily as needed for Cough    FLUTICASONE (FLONASE) 50 MCG/ACT NASAL SPRAY    1 spray by Each Nostril route daily    GUAIFENESIN (MUCINEX) 600 MG EXTENDED RELEASE TABLET    Take 2 tablets by mouth 2 times daily for 10 days          (Please note that portions of this note were completed with a voice recognition program.  Efforts were made to edit thedictations but occasionally words are mis-transcribed.)    RUBIO Kirkland (electronically signed)     Armond Kirkland  02/04/22 1431

## 2022-03-09 ENCOUNTER — OFFICE VISIT (OUTPATIENT)
Dept: NEUROLOGY | Facility: CLINIC | Age: 42
End: 2022-03-09

## 2022-03-09 VITALS
RESPIRATION RATE: 18 BRPM | OXYGEN SATURATION: 98 % | WEIGHT: 180 LBS | SYSTOLIC BLOOD PRESSURE: 126 MMHG | DIASTOLIC BLOOD PRESSURE: 68 MMHG | HEART RATE: 87 BPM | HEIGHT: 62 IN | BODY MASS INDEX: 33.13 KG/M2

## 2022-03-09 DIAGNOSIS — G43.709 CHRONIC MIGRAINE WITHOUT AURA WITHOUT STATUS MIGRAINOSUS, NOT INTRACTABLE: ICD-10-CM

## 2022-03-09 PROCEDURE — 99214 OFFICE O/P EST MOD 30 MIN: CPT | Performed by: NURSE PRACTITIONER

## 2022-03-09 NOTE — PROGRESS NOTES
Neurology Progress Note      Chief Complaint:    Seizure Disorder  Migraines    Subjective     Subjective:  Mandy Barrera is a 41 y.o. female who presents today for seizures and migraines.  She is routinely followed by Dr. James Cuevas MD for primary care.  She reports to me that she has had no seizures since last being seen.  She continues to take Topamax 50mg twice daily with no complaints of side effects. She reports 1-2 migraines per month which are severe.  She is able to take Ubrelvy and successfully abort these.  She otherwise complains of other headaches which occur about 1-2 times per week which seem to be related to her allergies.  She has a mild dull pain with some minor dizziness.  This goes away spontaneously.  She states she has not taken Emgality for some time and reports she continues to do well.  She really has no complaints or concerns today.      Past Medical History:   Diagnosis Date   • Cerebral aneurysm    • GERD (gastroesophageal reflux disease)    • H/O: hysterectomy    • Hypertension    • Iron deficiency anemia    • Seizure (HCC)      Past Surgical History:   Procedure Laterality Date   • APPENDECTOMY     •  SECTION      x2   • CHOLECYSTECTOMY     • COLONOSCOPY     • CRANIOTOMY     • ENDOMETRIAL ABLATION     • HYSTERECTOMY       Family History   Problem Relation Age of Onset   • Rheum arthritis Mother    • No Known Problems Brother    • No Known Problems Sister    • No Known Problems Daughter    • No Known Problems Brother    • No Known Problems Brother    • No Known Problems Brother    • No Known Problems Daughter    • Breast cancer Maternal Great-Grandmother      Social History     Tobacco Use   • Smoking status: Never Smoker   • Smokeless tobacco: Never Used   Substance Use Topics   • Alcohol use: No   • Drug use: No     Medications:  Current Outpatient Medications   Medication Sig Dispense Refill   • albuterol sulfate  (90 Base) MCG/ACT inhaler Inhale 2 puffs every  4 hours by inhalation route. 18 g 5   • albuterol sulfate  (90 Base) MCG/ACT inhaler Every 4 (Four) Hours.     • benzonatate (TESSALON) 100 MG capsule Take 1 capsule by mouth 2 (Two) Times a Day As Needed for Cough. 20 capsule 0   • clobetasol (TEMOVATE) 0.05 % ointment Apply twice a day to affected areas on breast for eczema. 15 g 3   • escitalopram (LEXAPRO) 20 MG tablet Take 1 tablet by mouth every day. 30 tablet 5   • fluticasone (FLONASE) 50 MCG/ACT nasal spray Use 2 sprays into the nostril as directed by provider Daily. 16 g 5   • fluticasone (FLONASE) 50 MCG/ACT nasal spray Use 1 spray in Each Nare Daily. 16 g 0   • guaiFENesin (MUCINEX) 600 MG 12 hr tablet Take 2 tablets by mouth 2 (Two) Times a Day. 40 tablet 0   • topiramate (Topamax) 50 MG tablet Take 1 tablet by mouth 2 (Two) Times a Day. 60 tablet 5   • traZODone (DESYREL) 50 MG tablet Take 0.5 tablet by mouth Daily for 7 days, THEN 1 tablet Daily As Needed for up to 23 days. 30 tablet 2   • triamterene-hydrochlorothiazide (MAXZIDE-25) 37.5-25 MG per tablet Take 1/2 tablet by mouth once daily as needed for elevated blood pressure or swelling 30 tablet 1   • ubrogepant (ubrogepant) 100 MG tablet Take 1 tablet by mouth at onset of migraine may repeat in 2 hours if needed max 2 tablets in 24 hours 30 tablet 2     No current facility-administered medications for this visit.     Current outpatient and discharge medications have been reconciled for the patient.  Reviewed by: ARA Narvaez      Allergies:    Infed [iron dextran], Hydromorphone hcl, Wilfredo butter, Ampicillin, Lamotrigine, Latex, Lortab [hydrocodone-acetaminophen], Morphine, Nitrofurantoin, Nuts, and Poison ivy extract    Review of Systems:   Review of Systems   Neurological: Positive for dizziness and headache. Negative for seizures.   All other systems reviewed and are negative.    Objective      Vital Signs  Heart Rate:  [87] 87  Resp:  [18] 18  BP: (126)/(68) 126/68    Physical  Exam:  Physical Exam  Vitals reviewed.   Constitutional:       Appearance: Normal appearance.   HENT:      Head: Normocephalic.   Eyes:      Extraocular Movements: Extraocular movements intact.      Pupils: Pupils are equal, round, and reactive to light.   Cardiovascular:      Rate and Rhythm: Normal rate and regular rhythm.      Pulses: Normal pulses.   Pulmonary:      Effort: Pulmonary effort is normal.   Musculoskeletal:         General: Normal range of motion.      Cervical back: Normal range of motion and neck supple.   Skin:     General: Skin is warm and dry.      Capillary Refill: Capillary refill takes less than 2 seconds.   Neurological:      Gait: Gait is intact.   Psychiatric:         Mood and Affect: Mood normal.       Neurologic Exam:  Mental Status:    -Awake. Alert. Oriented to person, place & time.  -No word finding difficulties.  -No aphasia.  -No dysarthria.  -Follows simple commands.     CN II:  Full visual fields with confrontation.  Pupils equally reactive to light.  CN III, IV, VI:  Extraocular muscles function intact with no nystagmus.  CN V:  Facial sensory is symmetric.  CN VII:  Facial motor symmetric.  CN VIII:  Gross hearing intact bilaterally.  CN IX/X:  Palate elevates symmetrically.  CN XI:  Shoulder shrug symmetric.  CN XII:  Tongue is midline on protrusion.     Motor: (strength out of 5:  1= minimal movement, 2 = movement in plane of gravity, 3 = movement against gravity, 4 = movement against some resistance, 5 = full strength)     -5/5 in bilateral biceps, triceps, brachioradialis, wrist extensors and intrinsic muscles of the hand.    -5/5 in bilateral hip flexors, quadriceps, hamstrings, gastrocsoleus complex, anterior tibialis and extensor hallucis longus.       Deep Tendon Reflexes:  -Right              Biceps: 2+         Triceps: 2+      Brachioradialis: 2+              Patella: 2+       Ankle: 2+         Babinski:  negative  -Left              Biceps: 2+         Triceps: 2+       Brachioradialis: 2+              Patella: 2+       Ankle: 2+         Babinski:  negative    Tone (Modified Charlene Scale):  No appreciable increase in tone or rigidity noted.     Sensory:  -Intact to light touch, pinprick BUE (C5-T1) and BLE (L2-S1).     Coordination:  -Finger to nose intact BUEs  -Heel to shin intact BLEs  -No ataxia     Gait  -No signs of ataxia  -ambulates unassisted       Results Review:    Lab Results   Component Value Date    GLUCOSE 101 (H) 10/17/2021    BUN 16 10/17/2021    CREATININE 0.70 10/17/2021    EGFRIFNONA >60 08/21/2019    EGFRIFAFRI 112 10/17/2021    BCR 22.9 10/17/2021    K 3.9 10/17/2021    CO2 23.0 10/17/2021    CALCIUM 9.1 10/17/2021    ALBUMIN 4.20 10/17/2021    AST 15 10/17/2021    ALT 13 10/17/2021     Lab Results   Component Value Date    WBC 9.28 10/17/2021    HGB 12.5 10/17/2021    HCT 39.6 10/17/2021    MCV 85.9 10/17/2021     10/17/2021     Assessment/Plan     Impression:  Seizure Disorder  Episodic Migraine    Plan:  Continue Topamax 50mg twice daily.      Continue Ubrelvy as needed for migraine onset.     We will hold off on Emgality at this time as she is doing well and has been off for some time.  We will consider restarting this if she were to see an increase in her migraines.  She states understanding.      It is recommended you observe all seizure precautions, including, but not limited to: no driving until seizure free for more than 3 months- as per State driving regulation / law, avoid all high-risk activities, take showers instead of baths, avoid swimming without observation, avoid open heat sources, avoid working at heights, and avoid engaging in all potentially hazardous activities.       The patient and I have discussed the plan of care and she is in full agreement at this time.     Follow-Up:  Return in about 6 months (around 9/9/2022) for Seizure follow-up, Migraine follow-up.      ARA Narvaez  03/09/22  12:25 CST

## 2022-03-24 ENCOUNTER — APPOINTMENT (OUTPATIENT)
Dept: GENERAL RADIOLOGY | Facility: HOSPITAL | Age: 42
End: 2022-03-24

## 2022-03-24 ENCOUNTER — HOSPITAL ENCOUNTER (EMERGENCY)
Facility: HOSPITAL | Age: 42
Discharge: HOME | End: 2022-03-25
Admitting: EMERGENCY MEDICINE

## 2022-03-24 DIAGNOSIS — R07.9 CHEST PAIN, UNSPECIFIED TYPE: Primary | ICD-10-CM

## 2022-03-24 DIAGNOSIS — J40 BRONCHITIS: ICD-10-CM

## 2022-03-24 LAB
APTT PPP: 26.3 SECONDS (ref 24.1–35)
BASOPHILS # BLD AUTO: 0.05 10*3/MM3 (ref 0–0.2)
BASOPHILS NFR BLD AUTO: 0.5 % (ref 0–1.5)
D DIMER PPP FEU-MCNC: 0.53 MG/L (FEU) (ref 0–0.5)
D-LACTATE SERPL-SCNC: 1.2 MMOL/L (ref 0.5–2)
DEPRECATED RDW RBC AUTO: 47 FL (ref 37–54)
EOSINOPHIL # BLD AUTO: 0.61 10*3/MM3 (ref 0–0.4)
EOSINOPHIL NFR BLD AUTO: 5.8 % (ref 0.3–6.2)
ERYTHROCYTE [DISTWIDTH] IN BLOOD BY AUTOMATED COUNT: 14.6 % (ref 12.3–15.4)
FLUAV RNA RESP QL NAA+PROBE: NOT DETECTED
FLUBV RNA RESP QL NAA+PROBE: NOT DETECTED
HCT VFR BLD AUTO: 39.3 % (ref 34–46.6)
HGB BLD-MCNC: 12.5 G/DL (ref 12–15.9)
IMM GRANULOCYTES # BLD AUTO: 0.05 10*3/MM3 (ref 0–0.05)
IMM GRANULOCYTES NFR BLD AUTO: 0.5 % (ref 0–0.5)
INR PPP: 0.88 (ref 0.91–1.09)
LYMPHOCYTES # BLD AUTO: 3.19 10*3/MM3 (ref 0.7–3.1)
LYMPHOCYTES NFR BLD AUTO: 30.3 % (ref 19.6–45.3)
MCH RBC QN AUTO: 27.7 PG (ref 26.6–33)
MCHC RBC AUTO-ENTMCNC: 31.8 G/DL (ref 31.5–35.7)
MCV RBC AUTO: 87.1 FL (ref 79–97)
MONOCYTES # BLD AUTO: 0.69 10*3/MM3 (ref 0.1–0.9)
MONOCYTES NFR BLD AUTO: 6.6 % (ref 5–12)
NEUTROPHILS NFR BLD AUTO: 5.94 10*3/MM3 (ref 1.7–7)
NEUTROPHILS NFR BLD AUTO: 56.3 % (ref 42.7–76)
NRBC BLD AUTO-RTO: 0 /100 WBC (ref 0–0.2)
NT-PROBNP SERPL-MCNC: 93 PG/ML (ref 0–450)
PLATELET # BLD AUTO: 302 10*3/MM3 (ref 140–450)
PMV BLD AUTO: 10.2 FL (ref 6–12)
PROTHROMBIN TIME: 11.6 SECONDS (ref 11.9–14.6)
RBC # BLD AUTO: 4.51 10*6/MM3 (ref 3.77–5.28)
SARS-COV-2 RNA RESP QL NAA+PROBE: NOT DETECTED
TROPONIN T SERPL-MCNC: <0.01 NG/ML (ref 0–0.03)
WBC NRBC COR # BLD: 10.53 10*3/MM3 (ref 3.4–10.8)

## 2022-03-24 PROCEDURE — 85730 THROMBOPLASTIN TIME PARTIAL: CPT | Performed by: PHYSICIAN ASSISTANT

## 2022-03-24 PROCEDURE — 85610 PROTHROMBIN TIME: CPT | Performed by: PHYSICIAN ASSISTANT

## 2022-03-24 PROCEDURE — 85379 FIBRIN DEGRADATION QUANT: CPT | Performed by: PHYSICIAN ASSISTANT

## 2022-03-24 PROCEDURE — 93010 ELECTROCARDIOGRAM REPORT: CPT | Performed by: INTERNAL MEDICINE

## 2022-03-24 PROCEDURE — 83605 ASSAY OF LACTIC ACID: CPT | Performed by: PHYSICIAN ASSISTANT

## 2022-03-24 PROCEDURE — 84484 ASSAY OF TROPONIN QUANT: CPT | Performed by: PHYSICIAN ASSISTANT

## 2022-03-24 PROCEDURE — 83880 ASSAY OF NATRIURETIC PEPTIDE: CPT | Performed by: PHYSICIAN ASSISTANT

## 2022-03-24 PROCEDURE — 93005 ELECTROCARDIOGRAM TRACING: CPT

## 2022-03-24 PROCEDURE — 36415 COLL VENOUS BLD VENIPUNCTURE: CPT

## 2022-03-24 PROCEDURE — 80053 COMPREHEN METABOLIC PANEL: CPT | Performed by: PHYSICIAN ASSISTANT

## 2022-03-24 PROCEDURE — 71045 X-RAY EXAM CHEST 1 VIEW: CPT

## 2022-03-24 PROCEDURE — 87636 SARSCOV2 & INF A&B AMP PRB: CPT | Performed by: PHYSICIAN ASSISTANT

## 2022-03-24 PROCEDURE — 85025 COMPLETE CBC W/AUTO DIFF WBC: CPT | Performed by: PHYSICIAN ASSISTANT

## 2022-03-24 PROCEDURE — 84145 PROCALCITONIN (PCT): CPT | Performed by: PHYSICIAN ASSISTANT

## 2022-03-24 PROCEDURE — 99283 EMERGENCY DEPT VISIT LOW MDM: CPT

## 2022-03-24 RX ORDER — SODIUM CHLORIDE 0.9 % (FLUSH) 0.9 %
10 SYRINGE (ML) INJECTION AS NEEDED
Status: DISCONTINUED | OUTPATIENT
Start: 2022-03-24 | End: 2022-03-25 | Stop reason: HOSPADM

## 2022-03-25 ENCOUNTER — APPOINTMENT (OUTPATIENT)
Dept: CT IMAGING | Facility: HOSPITAL | Age: 42
End: 2022-03-25

## 2022-03-25 VITALS
WEIGHT: 180 LBS | SYSTOLIC BLOOD PRESSURE: 142 MMHG | BODY MASS INDEX: 31.89 KG/M2 | RESPIRATION RATE: 18 BRPM | OXYGEN SATURATION: 100 % | HEIGHT: 63 IN | HEART RATE: 82 BPM | DIASTOLIC BLOOD PRESSURE: 92 MMHG | TEMPERATURE: 98.3 F

## 2022-03-25 LAB
ALBUMIN SERPL-MCNC: 3.8 G/DL (ref 3.5–5.2)
ALBUMIN/GLOB SERPL: 1.3 G/DL
ALP SERPL-CCNC: 74 U/L (ref 39–117)
ALT SERPL W P-5'-P-CCNC: 13 U/L (ref 1–33)
ANION GAP SERPL CALCULATED.3IONS-SCNC: 10 MMOL/L (ref 5–15)
AST SERPL-CCNC: 15 U/L (ref 1–32)
BILIRUB SERPL-MCNC: 0.2 MG/DL (ref 0–1.2)
BUN SERPL-MCNC: 12 MG/DL (ref 6–20)
BUN/CREAT SERPL: 20.3 (ref 7–25)
CALCIUM SPEC-SCNC: 8.9 MG/DL (ref 8.6–10.5)
CHLORIDE SERPL-SCNC: 105 MMOL/L (ref 98–107)
CO2 SERPL-SCNC: 23 MMOL/L (ref 22–29)
CREAT SERPL-MCNC: 0.59 MG/DL (ref 0.57–1)
EGFRCR SERPLBLD CKD-EPI 2021: 116.3 ML/MIN/1.73
GLOBULIN UR ELPH-MCNC: 2.9 GM/DL
GLUCOSE SERPL-MCNC: 104 MG/DL (ref 65–99)
POTASSIUM SERPL-SCNC: 3.9 MMOL/L (ref 3.5–5.2)
PROCALCITONIN SERPL-MCNC: <0.2 NG/ML (ref 0–0.25)
PROT SERPL-MCNC: 6.7 G/DL (ref 6–8.5)
QT INTERVAL: 410 MS
QTC INTERVAL: 439 MS
SODIUM SERPL-SCNC: 138 MMOL/L (ref 136–145)

## 2022-03-25 PROCEDURE — 25010000002 DEXAMETHASONE PER 1 MG: Performed by: EMERGENCY MEDICINE

## 2022-03-25 PROCEDURE — 71275 CT ANGIOGRAPHY CHEST: CPT

## 2022-03-25 PROCEDURE — 0 IOPAMIDOL PER 1 ML: Performed by: PHYSICIAN ASSISTANT

## 2022-03-25 PROCEDURE — 96374 THER/PROPH/DIAG INJ IV PUSH: CPT

## 2022-03-25 RX ORDER — PREDNISONE 10 MG/1
50 TABLET ORAL DAILY
Qty: 35 TABLET | Refills: 0 | Status: SHIPPED | OUTPATIENT
Start: 2022-03-25 | End: 2022-12-28

## 2022-03-25 RX ORDER — DEXAMETHASONE SODIUM PHOSPHATE 10 MG/ML
10 INJECTION INTRAMUSCULAR; INTRAVENOUS ONCE
Status: COMPLETED | OUTPATIENT
Start: 2022-03-25 | End: 2022-03-25

## 2022-03-25 RX ADMIN — IOPAMIDOL 100 ML: 755 INJECTION, SOLUTION INTRAVENOUS at 00:41

## 2022-03-25 RX ADMIN — DEXAMETHASONE SODIUM PHOSPHATE 10 MG: 10 INJECTION INTRAMUSCULAR; INTRAVENOUS at 01:54

## 2022-03-25 NOTE — ED PROVIDER NOTES
"Subjective   History of Present Illness    Patient is a 41-year-old female presenting to ED with cough, shortness of breath.  PMH significant for hypertension, GERD, seizure disorder, cerebral aneurysm is post craniotomy.  Patient states 2 weeks ago she began developing a nonproductive dry cough which has persisted.  Patient states that for the first 4 days she had low-grade fevers however they resolved and since then she has had no further symptoms.  Patient reports over the past couple days she has developed a slight increased shortness of breath with her cough describing that if she tries to lay flat she develops a wheeze and also feeling like \"I am smothering myself.\"  Patient states that she began developing a pain which is significantly worse with breath to the right side of her chest.  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  Patient denies any current fevers, chills, diaphoresis, productive nature of the cough, abdominal pain, nausea, vomiting, diarrhea.  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  Patient states that over the past 2 weeks she has been using Motrin, Tylenol, her albuterol inhaler, as well as over-the-counter decongestants and denies any relief of her symptoms.    Patient denies any use of hormones, any tobacco products.  Patient denies any personal history of blood clots including DVT or PE..    Records reviewed show patient last seen in the ED on 2/4/2022 for close exposure to COVID-19 virus, viral syndrome.    Patient last in the outpatient setting at the neurology office on 3/9/2022 for chronic migraine.    Review of Systems   Constitutional: Negative.  Negative for chills, diaphoresis and fever (4 days 2 weeks ago, since resolved).   HENT: Negative.    Eyes: Negative.    Respiratory: Positive for cough " (non productive), chest tightness and shortness of breath.    Cardiovascular: Positive for chest pain (right sided, sharp).   Gastrointestinal: Negative.  Negative for abdominal pain, nausea and vomiting.   Genitourinary: Negative.         Denies pregnancy (hysterectomy)   Musculoskeletal: Negative.  Negative for arthralgias and myalgias.   Skin: Negative.    Neurological: Negative.  Negative for dizziness and light-headedness.   Psychiatric/Behavioral: Negative.    All other systems reviewed and are negative.      Past Medical History:   Diagnosis Date   • Cerebral aneurysm    • GERD (gastroesophageal reflux disease)    • H/O: hysterectomy    • Hypertension    • Iron deficiency anemia    • Seizure (HCC)        Allergies   Allergen Reactions   • Infed [Iron Dextran] Anaphylaxis     Throat tightness   • Hydromorphone Hcl Itching   • Rafiq Butter Hives     Allergic to rafiq fruit    • Ampicillin Rash   • Lamotrigine Rash   • Latex Rash   • Lortab [Hydrocodone-Acetaminophen] Rash   • Morphine Rash   • Nitrofurantoin Rash   • Nuts Rash   • Poison Ivy Extract Rash       Past Surgical History:   Procedure Laterality Date   • APPENDECTOMY     •  SECTION      x2   • CHOLECYSTECTOMY     • COLONOSCOPY     • CRANIOTOMY     • ENDOMETRIAL ABLATION     • HYSTERECTOMY         Family History   Problem Relation Age of Onset   • Rheum arthritis Mother    • No Known Problems Brother    • No Known Problems Sister    • No Known Problems Daughter    • No Known Problems Brother    • No Known Problems Brother    • No Known Problems Brother    • No Known Problems Daughter    • Breast cancer Maternal Great-Grandmother        Social History     Socioeconomic History   • Marital status: Single   Tobacco Use   • Smoking status: Never Smoker   • Smokeless tobacco: Never Used   Substance and Sexual Activity   • Alcohol use: No   • Drug use: No   • Sexual activity: Yes     Partners: Male           Objective   Physical Exam  Vitals and  nursing note reviewed.   Constitutional:       Appearance: Normal appearance. She is well-developed, well-groomed and overweight. She is not ill-appearing, toxic-appearing or diaphoretic.   HENT:      Head: Normocephalic.      Mouth/Throat:      Mouth: Mucous membranes are moist.      Pharynx: Oropharynx is clear.   Eyes:      Conjunctiva/sclera: Conjunctivae normal.      Pupils: Pupils are equal, round, and reactive to light.   Cardiovascular:      Rate and Rhythm: Normal rate and regular rhythm.   Pulmonary:      Effort: Pulmonary effort is normal. No respiratory distress.      Breath sounds: No stridor. Wheezing (faint, expiratory, upper lungs) present. No rhonchi.   Chest:      Chest wall: No tenderness.   Abdominal:      General: Bowel sounds are normal.      Palpations: Abdomen is soft.      Tenderness: There is no abdominal tenderness.   Musculoskeletal:         General: Normal range of motion.      Cervical back: Normal range of motion and neck supple.      Right lower leg: No edema.      Left lower leg: No edema.   Skin:     General: Skin is warm and dry.   Neurological:      Mental Status: She is alert and oriented to person, place, and time.      Gait: Gait normal.   Psychiatric:         Attention and Perception: Attention normal.         Mood and Affect: Mood and affect normal.         Speech: Speech normal.         Behavior: Behavior normal. Behavior is cooperative.         Procedures           ED Course    HEART SCORE: 1 (risk factors - HTN)    PERC SCORE: 0 (<2% chance of PE)    WELLS CRITERIA: Low risk group, 1.3% chance PE    ED Course as of 03/25/22 1435   Fri Mar 25, 2022   0115 Case discussed at this time with Dr. Félix Watt who will be assuming care of patient. Pending results of CT and repeat troponin, Dr. Watt will reevaluate and disposition accordingly. Please see Dr. Watt's note below for further ED course as well as final diagnosis.     Working diagnosis: Chest pain.    [JS]      ED  Course User Index  [JS] Virgilio Lorenzo PA-C                                                 MDM  Number of Diagnoses or Management Options     Amount and/or Complexity of Data Reviewed  Clinical lab tests: reviewed and ordered  Tests in the radiology section of CPT®: reviewed and ordered  Tests in the medicine section of CPT®: reviewed and ordered  Decide to obtain previous medical records or to obtain history from someone other than the patient: yes  Review and summarize past medical records: yes  Discuss the patient with other providers: yes (Dr. Félix Watt (attending))        Final diagnoses:   Chest pain, unspecified type   Bronchitis       ED Disposition  ED Disposition     ED Disposition   Discharge    Condition   Good    Comment   --             James Cuevas MD  [address]  [place] [address]  [phone]    Schedule an appointment as soon as possible for a visit in 3 days           Medication List      New Prescriptions    predniSONE 10 MG tablet  Commonly known as: DELTASONE  Take 5 tablets by mouth Daily.           Where to Get Your Medications      These medications were sent to [facility]  [address] [place] [address] [address], [place] [address]    Hours: 7AM-5PM Mon-Fri Phone: [phone]   · predniSONE 10 MG tablet          Virgilio Lorenzo PA-C  03/25/22 1907

## 2022-04-22 DIAGNOSIS — G40.909 SEIZURE DISORDER: ICD-10-CM

## 2022-04-22 RX ORDER — TOPIRAMATE 50 MG/1
50 TABLET, FILM COATED ORAL 2 TIMES DAILY
Qty: 60 TABLET | Refills: 5 | Status: SHIPPED | OUTPATIENT
Start: 2022-04-22

## 2022-06-10 ENCOUNTER — TELEPHONE (OUTPATIENT)
Dept: NEUROLOGY | Facility: CLINIC | Age: 42
End: 2022-06-10

## 2022-06-10 NOTE — TELEPHONE ENCOUNTER
----- Message from ARA Narvaez sent at 6/10/2022  2:38 PM CDT -----  See PCP for eval to rule out other infection and disease since she has diarrhea as well.  Headache likely worsened from this.  OK to take ubrelvy still.     ----- Message -----  From: Wendy Sanchez LPN  Sent: 6/10/2022   2:30 PM CDT  To: ARA Narvaez    Mandy said she has had a headache, vomiting, and diarrhea for 2 days.  It is her usual headache and Ubrelvy usually relieves the headache, but hasn't worked the last 2 days.  She wants to know what you recommend.

## 2022-06-10 NOTE — TELEPHONE ENCOUNTER
Caller: EHSAN SOLISSHELLEYKAREN    Relationship: SELF      What medications are you currently taking:   Current Outpatient Medications on File Prior to Visit   Medication Sig Dispense Refill   • albuterol sulfate  (90 Base) MCG/ACT inhaler Inhale 2 puffs every 4 hours by inhalation route. 18 g 5   • albuterol sulfate  (90 Base) MCG/ACT inhaler Every 4 (Four) Hours.     • benzonatate (TESSALON) 100 MG capsule Take 1 capsule by mouth 2 (Two) Times a Day As Needed for Cough. 20 capsule 0   • clobetasol (TEMOVATE) 0.05 % ointment Apply twice a day to affected areas on breast for eczema. 15 g 3   • escitalopram (LEXAPRO) 20 MG tablet Take 1 tablet by mouth every day. 30 tablet 5   • fluticasone (FLONASE) 50 MCG/ACT nasal spray Use 2 sprays into the nostril as directed by provider Daily. 16 g 5   • fluticasone (FLONASE) 50 MCG/ACT nasal spray Use 1 spray in Each Nare Daily. 16 g 0   • guaiFENesin (MUCINEX) 600 MG 12 hr tablet Take 2 tablets by mouth 2 (Two) Times a Day. 40 tablet 0   • pantoprazole (PROTONIX) 20 MG EC tablet Take 1 tablet by mouth 2 (Two) Times a Day. 60 tablet 5   • predniSONE (DELTASONE) 10 MG tablet Take 5 tablets by mouth Daily. 35 tablet 0   • sulfamethoxazole-trimethoprim (Bactrim DS) 800-160 MG per tablet Take 1 tablet by mouth Every 12 (Twelve) Hours for 3 days 6 tablet 0   • topiramate (Topamax) 50 MG tablet Take 1 tablet by mouth 2 (Two) Times a Day. 60 tablet 5   • traZODone (DESYREL) 50 MG tablet Take 0.5 tablet by mouth Daily for 7 days, THEN 1 tablet Daily As Needed for up to 23 days. 30 tablet 2   • triamterene-hydrochlorothiazide (MAXZIDE-25) 37.5-25 MG per tablet Take 1/2 tablet by mouth once daily as needed for elevated blood pressure or swelling 30 tablet 1   • ubrogepant (ubrogepant) 100 MG tablet Take 1 tablet by mouth at onset of migraine may repeat in 2 hours if needed max 2 tablets in 24 hours 30 tablet 2     No current facility-administered medications on file prior to  visit.      Which medication are you concerned about: UBRELVY    Who prescribed you this medication:ARA RENO    What are your concerns: PT CALLING CAUSE SHE'S HAD A MIGRAINE FOR 2 DAYS THAT'S SHE SAID IS IN HER NECK AND THAT SHE HAS HAD DIARRHEA & THROW UP  THE LAS 2 DAYS ALSO. THE UBRELVY IS NOT HELPING AND DR. TIRADO TOLD HER TO CALL IF SHE HAS A HEADACHE AND THE UBREVLY NOT WORKING.    PLEASE CALL HER BACK [phone]

## 2022-06-21 ENCOUNTER — TELEPHONE (OUTPATIENT)
Dept: NEUROLOGY | Facility: CLINIC | Age: 42
End: 2022-06-21

## 2022-06-21 NOTE — TELEPHONE ENCOUNTER
Pharmacy Name:  [facility]     Pharmacy representative name: HELEN    Pharmacy representative phone number: [phone]    What medication are you calling in regards to: UBRELVY    What question does the pharmacy have: UBRELVY PRIOR AUTH EXPIRES ON 7-5-22

## 2022-08-17 ENCOUNTER — HOSPITAL ENCOUNTER (OUTPATIENT)
Dept: ULTRASOUND IMAGING | Facility: HOSPITAL | Age: 42
Discharge: HOME | End: 2022-08-17
Admitting: PHYSICIAN ASSISTANT

## 2022-08-17 ENCOUNTER — TRANSCRIBE ORDERS (OUTPATIENT)
Dept: ADMINISTRATIVE | Facility: HOSPITAL | Age: 42
End: 2022-08-17

## 2022-08-17 DIAGNOSIS — M79.601 PAIN IN RIGHT ARM: Primary | ICD-10-CM

## 2022-08-17 PROCEDURE — 93971 EXTREMITY STUDY: CPT

## 2022-08-30 ENCOUNTER — NURSE TRIAGE (OUTPATIENT)
Dept: CALL CENTER | Facility: HOSPITAL | Age: 42
End: 2022-08-30

## 2022-08-30 ENCOUNTER — APPOINTMENT (OUTPATIENT)
Dept: GENERAL RADIOLOGY | Facility: HOSPITAL | Age: 42
End: 2022-08-30

## 2022-08-30 ENCOUNTER — HOSPITAL ENCOUNTER (EMERGENCY)
Facility: HOSPITAL | Age: 42
Discharge: HOME | End: 2022-08-30
Admitting: INTERNAL MEDICINE

## 2022-08-30 VITALS
SYSTOLIC BLOOD PRESSURE: 112 MMHG | TEMPERATURE: 99 F | HEART RATE: 86 BPM | WEIGHT: 171 LBS | BODY MASS INDEX: 30.3 KG/M2 | RESPIRATION RATE: 20 BRPM | HEIGHT: 63 IN | OXYGEN SATURATION: 99 % | DIASTOLIC BLOOD PRESSURE: 93 MMHG

## 2022-08-30 DIAGNOSIS — S86.912A STRAIN OF LEFT KNEE, INITIAL ENCOUNTER: Primary | ICD-10-CM

## 2022-08-30 PROCEDURE — 25010000002 KETOROLAC TROMETHAMINE PER 15 MG: Performed by: NURSE PRACTITIONER

## 2022-08-30 PROCEDURE — 73562 X-RAY EXAM OF KNEE 3: CPT

## 2022-08-30 PROCEDURE — 96372 THER/PROPH/DIAG INJ SC/IM: CPT

## 2022-08-30 PROCEDURE — 99283 EMERGENCY DEPT VISIT LOW MDM: CPT

## 2022-08-30 RX ORDER — KETOROLAC TROMETHAMINE 30 MG/ML
30 INJECTION, SOLUTION INTRAMUSCULAR; INTRAVENOUS ONCE
Status: COMPLETED | OUTPATIENT
Start: 2022-08-30 | End: 2022-08-30

## 2022-08-30 RX ORDER — KETOROLAC TROMETHAMINE 10 MG/1
10 TABLET, FILM COATED ORAL EVERY 6 HOURS PRN
Qty: 8 TABLET | Refills: 0 | Status: SHIPPED | OUTPATIENT
Start: 2022-08-30 | End: 2022-09-01

## 2022-08-30 RX ADMIN — KETOROLAC TROMETHAMINE 30 MG: 30 INJECTION, SOLUTION INTRAMUSCULAR; INTRAVENOUS at 19:45

## 2022-08-30 NOTE — TELEPHONE ENCOUNTER
"    Reason for Disposition  • [1] SEVERE pain AND [2] not improved 2 hours after pain medicine/ice packs    Additional Information  • Negative: Serious injury with multiple fractures (broken bones)  • Negative: [1] Major bleeding (e.g., actively dripping or spurting) AND [2] can't be stopped  • Negative: Looks like a dislocated joint or knee cap (crooked or deformed)  • Negative: Bullet wound, stabbed by knife, or other serious penetrating wound  • Negative: Sounds like a life-threatening emergency to the triager  • Negative: Wound looks infected  • Negative: Can't stand (bear weight) or walk  • Negative: Skin is split open or gaping (or length > 1/2 inch or 12 mm)  • Negative: [1] Bleeding AND [2] won't stop after 10 minutes of direct pressure (using correct technique)  • Negative: [1] Dirt in the wound AND [2] not removed with 15 minutes of scrubbing  • Negative: Sounds like a serious injury to the triager    Answer Assessment - Initial Assessment Questions  1. MECHANISM: \"How did the injury happen?\" (e.g., twisting injury, direct blow)       Landed on left knee while doing jumping jacks  2. ONSET: \"When did the injury happen?\" (Minutes or hours ago)       This am  3. LOCATION: \"Where is the injury located?\"   Left knee  4. APPEARANCE of INJURY: \"What does the injury look like?\"      Puffy some bruising on right side  5. SEVERITY: \"Can you put weight on that leg?\" \"Can you walk?\"    Hobbling getting to where she cant walk  6. SIZE: For cuts, bruises, or swelling, ask: \"How large is it?\" (e.g., inches or centimeters;  entire joint)   denies  7. PAIN: \"Is there pain?\" If Yes, ask: \"How bad is the pain?\"  \"What does it keep you from doing?\" (e.g., Scale 1-10; or mild, moderate, severe)    -  NONE: (0): no pain    -  MILD (1-3): doesn't interfere with normal activities     -  MODERATE (4-7): interferes with normal activities (e.g., work or school) or awakens from sleep, limping     -  SEVERE (8-10): excruciating pain, " "unable to do any normal activities, unable to walk    10/10  8. TETANUS: For any breaks in the skin, ask: \"When was the last tetanus booster?\"    No breaks in skin  9. OTHER SYMPTOMS: \"Do you have any other symptoms?\"  (e.g., \"pop\" when knee injured, swelling, locking, buckling)    swelling of knee unable to straighten out completely  10. PREGNANCY: \"Is there any chance you are pregnant?\" \"When was your last menstrual period?\"     na    Protocols used: KNEE INJURY-ADULT-AH      "

## 2022-09-13 NOTE — TELEPHONE ENCOUNTER
Hub staff attempted to follow warm transfer process and was unsuccessful     Caller: PT    Relationship to patient: SELF    Best call back number: [phone]    Patient is needing: RETURNING CALL TO OFFICE. PLEASE ADVISE          
----- Message from Paramjit Nielsen PA-C sent at 2/5/2021  8:11 AM CST -----  And the patient notify me if this occurs again or repeatedly, however, does not sound consistent with seizure as she was conscious and aware of these for a matter of seconds.  However, since she reported this to the DMV, she would likely need to wait 90 days until resumption of driving or application for licensure.    mb  ----- Message -----  From: Wendy Sanchez LPN  Sent: 2/3/2021   3:58 PM CST  To: Paramjit Nielsen PA-C    Mandy said about 2 weeks ago, she was watching TV and her eyes started jerking back and forth, the episode lasted a few seconds. She thinks she had a headache at the time of the episode.  She has never had this happen before.        
Message left requesting a return call.  
PT CALLING BACK FOR DK UNABLE TO TRANSFER NO ANSWER   CALL BACK @ [phone]  
Patient Name: EHSAN PENNINGTON  : 1980  MRN: [id number]    Hub staff attempted to follow warm transfer process and was unsuccessful.   Patient is needing: PT RETURNING DK PHONE CALL.  Best call back number:[phone]  
Detail Level: Detailed

## 2022-12-28 PROCEDURE — 87086 URINE CULTURE/COLONY COUNT: CPT | Performed by: NURSE PRACTITIONER

## 2022-12-29 ENCOUNTER — HOSPITAL ENCOUNTER (EMERGENCY)
Facility: HOSPITAL | Age: 42
Discharge: LEFT BEFORE DISCHARGE | End: 2022-12-29

## 2022-12-29 VITALS
OXYGEN SATURATION: 97 % | RESPIRATION RATE: 18 BRPM | BODY MASS INDEX: 30.48 KG/M2 | SYSTOLIC BLOOD PRESSURE: 143 MMHG | HEART RATE: 137 BPM | HEIGHT: 63 IN | TEMPERATURE: 103.2 F | DIASTOLIC BLOOD PRESSURE: 83 MMHG | WEIGHT: 172 LBS

## 2022-12-29 LAB — HOLD SPECIMEN: NORMAL

## 2022-12-29 PROCEDURE — 99211 OFF/OP EST MAY X REQ PHY/QHP: CPT

## 2023-02-06 ENCOUNTER — TELEPHONE (OUTPATIENT)
Dept: NEUROLOGY | Facility: CLINIC | Age: 43
End: 2023-02-06

## 2023-02-06 NOTE — TELEPHONE ENCOUNTER
Caller: Mandy Barrera    Relationship: Self    Best call back number: [phone]    What is the best time to reach you: ANY    Who are you requesting to speak with (clinical staff, provider,  specific staff member): DK    Do you know the name of the person who called: DK    What was the call regarding: PATIENT MESSAGE    Do you require a callback: YES PLEASE

## 2023-12-13 ENCOUNTER — OFFICE VISIT (OUTPATIENT)
Dept: FAMILY MEDICINE CLINIC | Facility: CLINIC | Age: 43
End: 2023-12-13
Payer: COMMERCIAL

## 2023-12-13 VITALS
DIASTOLIC BLOOD PRESSURE: 80 MMHG | BODY MASS INDEX: 30.65 KG/M2 | OXYGEN SATURATION: 93 % | WEIGHT: 173 LBS | SYSTOLIC BLOOD PRESSURE: 122 MMHG | HEIGHT: 63 IN | HEART RATE: 94 BPM | TEMPERATURE: 97.5 F

## 2023-12-13 DIAGNOSIS — M99.00 SOMATIC DYSFUNCTION OF HEAD REGION: ICD-10-CM

## 2023-12-13 DIAGNOSIS — M99.03 SOMATIC DYSFUNCTION OF SPINE, LUMBAR: ICD-10-CM

## 2023-12-13 DIAGNOSIS — M99.02 SOMATIC DYSFUNCTION OF SPINE, THORACIC: ICD-10-CM

## 2023-12-13 DIAGNOSIS — M99.08 SEGMENTAL AND SOMATIC DYSFUNCTION OF RIB CAGE: ICD-10-CM

## 2023-12-13 DIAGNOSIS — M99.05 SOMATIC DYSFUNCTION OF PELVIC REGION: ICD-10-CM

## 2023-12-13 DIAGNOSIS — M54.2 NECK PAIN ON RIGHT SIDE: Primary | ICD-10-CM

## 2023-12-13 DIAGNOSIS — M99.01 SOMATIC DYSFUNCTION OF SPINE, CERVICAL: ICD-10-CM

## 2023-12-13 DIAGNOSIS — M99.04 SOMATIC DYSFUNCTION OF SPINE, SACRAL: ICD-10-CM

## 2023-12-13 DIAGNOSIS — M99.06 SOMATIC DYSFUNCTION OF LOWER EXTREMITY: ICD-10-CM
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
## 2024-01-08 ENCOUNTER — TRANSCRIBE ORDERS (OUTPATIENT)
Dept: ADMINISTRATIVE | Facility: HOSPITAL | Age: 44
End: 2024-01-08
Payer: COMMERCIAL

## 2024-01-08 ENCOUNTER — HOSPITAL ENCOUNTER (OUTPATIENT)
Dept: GENERAL RADIOLOGY | Facility: HOSPITAL | Age: 44
Discharge: HOME | End: 2024-01-08
Admitting: INTERNAL MEDICINE
Payer: COMMERCIAL

## 2024-01-08 DIAGNOSIS — M25.511 RIGHT SHOULDER PAIN, UNSPECIFIED CHRONICITY: Primary | ICD-10-CM

## 2024-01-08 DIAGNOSIS — Z12.31 ENCOUNTER FOR SCREENING MAMMOGRAM FOR MALIGNANT NEOPLASM OF BREAST: Primary | ICD-10-CM

## 2024-01-08 PROCEDURE — 73030 X-RAY EXAM OF SHOULDER: CPT

## 2024-01-25 ENCOUNTER — OFFICE VISIT (OUTPATIENT)
Dept: FAMILY MEDICINE CLINIC | Facility: CLINIC | Age: 44
End: 2024-01-25
Payer: COMMERCIAL

## 2024-01-25 VITALS
DIASTOLIC BLOOD PRESSURE: 88 MMHG | TEMPERATURE: 97.1 F | WEIGHT: 174 LBS | OXYGEN SATURATION: 99 % | RESPIRATION RATE: 18 BRPM | HEART RATE: 93 BPM | BODY MASS INDEX: 30.83 KG/M2 | SYSTOLIC BLOOD PRESSURE: 123 MMHG | HEIGHT: 63 IN

## 2024-01-25 DIAGNOSIS — M99.04 SOMATIC DYSFUNCTION OF SPINE, SACRAL: ICD-10-CM

## 2024-01-25 DIAGNOSIS — M99.01 SOMATIC DYSFUNCTION OF SPINE, CERVICAL: ICD-10-CM

## 2024-01-25 DIAGNOSIS — M99.00 SOMATIC DYSFUNCTION OF HEAD REGION: ICD-10-CM

## 2024-01-25 DIAGNOSIS — M54.2 NECK PAIN ON RIGHT SIDE: Primary | ICD-10-CM

## 2024-01-25 DIAGNOSIS — M99.06 SOMATIC DYSFUNCTION OF LOWER EXTREMITY: ICD-10-CM

## 2024-01-25 DIAGNOSIS — M99.03 SOMATIC DYSFUNCTION OF SPINE, LUMBAR: ICD-10-CM

## 2024-01-25 DIAGNOSIS — M99.02 SOMATIC DYSFUNCTION OF SPINE, THORACIC: ICD-10-CM

## 2024-01-25 DIAGNOSIS — M99.09 SEGMENTAL AND SOMATIC DYSFUNCTION OF ABDOMEN AND OTHER REGIONS: ICD-10-CM

## 2024-01-25 DIAGNOSIS — M99.08 SEGMENTAL AND SOMATIC DYSFUNCTION OF RIB CAGE: ICD-10-CM

## 2024-01-25 DIAGNOSIS — M99.05 SOMATIC DYSFUNCTION OF PELVIC REGION: ICD-10-CM

## 2024-01-25 NOTE — PROGRESS NOTES
"Chief Complaint  Follow-up (1 month follow up pt states right shoulder still in pain )    Subjective        Mandy Barrera presents to [facility]  History of Present Illness  Modest response to last OMT Tx but overused her arms and shoulders and has had persistent R sided neck pain since. No radicular pain. Increased stress surrounding daughter's mental health    Objective   Vital Signs:  /88   Pulse 93   Temp 97.1 °F (36.2 °C)   Resp 18   Ht 160 cm (63\")   Wt 78.9 kg (174 lb)   SpO2 99%   BMI 30.82 kg/m²   Estimated body mass index is 30.82 kg/m² as calculated from the following:    Height as of this encounter: 160 cm (63\").    Weight as of this encounter: 78.9 kg (174 lb).       Physical Exam  Vitals and nursing note reviewed.   Constitutional:       General: She is not in acute distress.     Appearance: She is not diaphoretic.   HENT:      Head: Normocephalic and atraumatic.      Nose: Nose normal.   Eyes:      General: No scleral icterus.        Right eye: No discharge.         Left eye: No discharge.      Conjunctiva/sclera: Conjunctivae normal.   Neck:      Trachea: No tracheal deviation.   Pulmonary:      Effort: Pulmonary effort is normal.   Skin:     General: Skin is warm and dry.      Coloration: Skin is not pale.   Neurological:      Mental Status: She is alert and oriented to person, place, and time.   Psychiatric:         Behavior: Behavior normal.         Thought Content: Thought content normal.         Judgment: Judgment normal.      Osteopathic Structural Exam  Procedure Note for Osteopathic Manipulative Treatment    Pre-procedure diagnoses: Somatic dysfunctions as listed below.  Consent: Oral consent given for Osteopathic Treatment  Post-procedure diagnoses: same  Complications of procedure: none, patient tolerated procedure well    The evaluation including the history, physical exam and the management decisions, indicate than an appropriate " intervention on this date of service is osteopathic manipulative treatment. Oral permission for the osteopathic procedure was obtained. The following treatment methods and the responses for each body region are listed below.        Region Somatic Dysfunction Severity OMT technique Response      Head OA FSrRl Moderate Osteopathy in the cranial field Improved      Cervical C5 FRSL Moderate Balanced ligamentous tension Improved      Thoracic  Thoracic inlet FSlRr  T4 ERSL Moderate  Balanced ligamentous tension  Improved       Lumbar L5 ERSR Moderate Balanced ligamentous tension Improved      Sacral R unilateral flexion Moderate Balanced ligamentous tension Improved   Pelvic R anterior rotation  L posterior rotation   Moderate Balanced ligamentous tension Improved      Lower Extremities  R anterior fibular head  L psoas spasm  Moderate  Balanced ligamentous tension Improved       Rib Cage  R rib 2 and 4 posterior somatic dysfunction  Moderate  Balanced ligamentous tension  Improved       Abdomen & Other Sites  thoracic diaphragm rotated L Moderate  Myofascial Release Improved        Result Review :                     Assessment and Plan     Diagnoses and all orders for this visit:    1. Neck pain on right side (Primary)    2. Somatic dysfunction of head region    3. Somatic dysfunction of spine, cervical    4. Somatic dysfunction of spine, thoracic    5. Segmental and somatic dysfunction of rib cage    6. Segmental and somatic dysfunction of abdomen and other regions    7. Somatic dysfunction of spine, lumbar    8. Somatic dysfunction of pelvic region    9. Somatic dysfunction of spine, sacral    10. Somatic dysfunction of lower extremity    OMT to balance autonomic tone, improve fascial symmetry and respiratory/circulatory/lymphatic compliance  OTC pain meds PRN  F/u 4 weeks          Answers submitted by the patient for this visit:  Other (Submitted on 1/25/2024)  Please describe your symptoms.: Rt shoulder / neck  pain, Rt hip/ lower back pain  Have you had these symptoms before?: Yes  How long have you been having these symptoms?: Greater than 2 weeks  Please list any medications you are currently taking for this condition.: Tylenol, heat  Please describe any probable cause for these symptoms. : N/a  Primary Reason for Visit (Submitted on 1/25/2024)  What is the primary reason for your visit?: Other

## 2024-02-23 ENCOUNTER — OFFICE VISIT (OUTPATIENT)
Dept: FAMILY MEDICINE CLINIC | Facility: CLINIC | Age: 44
End: 2024-02-23
Payer: COMMERCIAL

## 2024-02-23 VITALS
RESPIRATION RATE: 18 BRPM | SYSTOLIC BLOOD PRESSURE: 132 MMHG | HEART RATE: 97 BPM | OXYGEN SATURATION: 99 % | TEMPERATURE: 98 F | BODY MASS INDEX: 31.47 KG/M2 | WEIGHT: 177.6 LBS | HEIGHT: 63 IN | DIASTOLIC BLOOD PRESSURE: 80 MMHG

## 2024-02-23 DIAGNOSIS — M99.07 SOMATIC DYSFUNCTION OF UPPER EXTREMITY: ICD-10-CM

## 2024-02-23 DIAGNOSIS — M99.03 SOMATIC DYSFUNCTION OF SPINE, LUMBAR: ICD-10-CM

## 2024-02-23 DIAGNOSIS — M99.09 SEGMENTAL AND SOMATIC DYSFUNCTION OF ABDOMEN AND OTHER REGIONS: ICD-10-CM

## 2024-02-23 DIAGNOSIS — M99.05 SOMATIC DYSFUNCTION OF PELVIC REGION: ICD-10-CM

## 2024-02-23 DIAGNOSIS — M99.06 SOMATIC DYSFUNCTION OF LOWER EXTREMITY: ICD-10-CM

## 2024-02-23 DIAGNOSIS — F32.A ANXIETY AND DEPRESSION: Primary | ICD-10-CM

## 2024-02-23 DIAGNOSIS — M54.2 NECK PAIN ON RIGHT SIDE: ICD-10-CM

## 2024-02-23 DIAGNOSIS — M54.50 CHRONIC MIDLINE LOW BACK PAIN WITHOUT SCIATICA: ICD-10-CM

## 2024-02-23 DIAGNOSIS — G89.29 CHRONIC MIDLINE LOW BACK PAIN WITHOUT SCIATICA: ICD-10-CM

## 2024-02-23 DIAGNOSIS — M99.04 SOMATIC DYSFUNCTION OF SPINE, SACRAL: ICD-10-CM

## 2024-02-23 DIAGNOSIS — M99.01 SOMATIC DYSFUNCTION OF SPINE, CERVICAL: ICD-10-CM

## 2024-02-23 DIAGNOSIS — F41.9 ANXIETY AND DEPRESSION: Primary | ICD-10-CM

## 2024-02-23 DIAGNOSIS — M99.08 SEGMENTAL AND SOMATIC DYSFUNCTION OF RIB CAGE: ICD-10-CM

## 2024-02-23 RX ORDER — ESCITALOPRAM OXALATE 20 MG/1
20 TABLET ORAL DAILY
Qty: 90 TABLET | Refills: 1 | Status: SHIPPED | OUTPATIENT
Start: 2024-02-23

## 2024-02-23 RX ORDER — BUSPIRONE HYDROCHLORIDE 5 MG/1
5 TABLET ORAL 3 TIMES DAILY
Qty: 270 TABLET | Refills: 0 | Status: SHIPPED | OUTPATIENT
Start: 2024-02-23

## 2024-02-23 RX ORDER — TRAZODONE HYDROCHLORIDE 50 MG/1
50 TABLET ORAL DAILY
Qty: 90 TABLET | Refills: 1 | Status: SHIPPED | OUTPATIENT
Start: 2024-02-23

## 2024-02-26 NOTE — PROGRESS NOTES
"Chief Complaint  Procedure (OMT - pt states pain in shoulder and lower back ) and Leg Swelling (Swelling in the legs)    Subjective        Mandy Barrera presents to [facility]  Leg Swelling      Reports intermittent swelling in legs, ok today  Still having neck and thoracic back pain and muscle tension R > L as well as low back pain  Thinks first PT visit made it worse  Mild aching pain today  Lots of stress dealing with daughter with mental health struggles, has not been on her antidepressants but would like to restart    Objective   Vital Signs:  /80   Pulse 97   Temp 98 °F (36.7 °C)   Resp 18   Ht 160 cm (63\")   Wt 80.6 kg (177 lb 9.6 oz)   SpO2 99%   BMI 31.46 kg/m²   Estimated body mass index is 31.46 kg/m² as calculated from the following:    Height as of this encounter: 160 cm (63\").    Weight as of this encounter: 80.6 kg (177 lb 9.6 oz).               Physical Exam  Vitals and nursing note reviewed.   Constitutional:       General: She is not in acute distress.     Appearance: She is not diaphoretic.   HENT:      Head: Normocephalic and atraumatic.      Nose: Nose normal.   Eyes:      General: No scleral icterus.        Right eye: No discharge.         Left eye: No discharge.      Conjunctiva/sclera: Conjunctivae normal.   Neck:      Trachea: No tracheal deviation.   Pulmonary:      Effort: Pulmonary effort is normal.   Skin:     General: Skin is warm and dry.      Coloration: Skin is not pale.   Neurological:      Mental Status: She is alert and oriented to person, place, and time.   Psychiatric:         Mood and Affect: Mood is anxious.         Behavior: Behavior normal.         Thought Content: Thought content normal.         Judgment: Judgment normal.      Osteopathic Structural Exam  Procedure Note for Osteopathic Manipulative Treatment    Pre-procedure diagnoses: Somatic dysfunctions as listed below.  Consent: Oral consent given for Osteopathic " Treatment  Post-procedure diagnoses: same  Complications of procedure: none, patient tolerated procedure well    The evaluation including the history, physical exam and the management decisions, indicate than an appropriate intervention on this date of service is osteopathic manipulative treatment. Oral permission for the osteopathic procedure was obtained. The following treatment methods and the responses for each body region are listed below.        Region Somatic Dysfunction Severity OMT technique Response      Cervical C5 FRSL  C6 ERSR Moderate Balanced ligamentous tension Improved      Thoracic  T4 ERSL  T7 FRSR  T10 FRSR Moderate  Balanced ligamentous tension  Improved       Lumbar L5 ERSR Moderate Balanced ligamentous tension Improved      Sacral R > L SI compression Moderate Balanced ligamentous tension Improved   Pelvic R anterior rotation  L posterior rotation Moderate Balanced ligamentous tension Improved      Lower Extremities  R psoas spasm  Moderate  Balanced ligamentous tension Improved       Upper Extremities  R rhomboid and levator scap spasm  R pec minor spasm  Moderate  Balanced ligamentous tension  Myofascial Release  Improved       Rib Cage  R rib 4 exhalation somatic dysfunction  Moderate  Balanced ligamentous tension  Improved       Abdomen & Other Sites  Thoracic diaphragm inhalation SD Moderate  Myofascial Release Improved        Result Review :                     Assessment and Plan     Diagnoses and all orders for this visit:    1. Anxiety and depression (Primary)  -     escitalopram (LEXAPRO) 20 MG tablet; Take 1 tablet by mouth every day.  Dispense: 90 tablet; Refill: 1  -     busPIRone (BUSPAR) 5 MG tablet; Take 1 tablet by mouth 3 (Three) Times a Day.  Dispense: 270 tablet; Refill: 0  -     traZODone (DESYREL) 50 MG tablet; Take 1 tablet by mouth Daily.  Dispense: 90 tablet; Refill: 1    2. Neck pain on right side    3. Chronic midline low back pain without sciatica    4. Somatic  dysfunction of spine, cervical    5. Segmental and somatic dysfunction of rib cage    6. Segmental and somatic dysfunction of abdomen and other regions    7. Somatic dysfunction of spine, lumbar    8. Somatic dysfunction of spine, sacral    9. Somatic dysfunction of upper extremity    10. Somatic dysfunction of pelvic region    11. Somatic dysfunction of lower extremity    OMT to balance autonomic tone, improve fascial symmetry and respiratory/circulatory/lymphatic compliance  Resume antidepressant regimen above  F/u 4 weeks  Continue with PT for now

## 2024-03-22 ENCOUNTER — PROCEDURE VISIT (OUTPATIENT)
Dept: FAMILY MEDICINE CLINIC | Facility: CLINIC | Age: 44
End: 2024-03-22
Payer: COMMERCIAL

## 2024-03-22 VITALS
TEMPERATURE: 98.9 F | HEIGHT: 63 IN | SYSTOLIC BLOOD PRESSURE: 130 MMHG | HEART RATE: 89 BPM | BODY MASS INDEX: 30.3 KG/M2 | WEIGHT: 171 LBS | OXYGEN SATURATION: 99 % | RESPIRATION RATE: 18 BRPM | DIASTOLIC BLOOD PRESSURE: 87 MMHG

## 2024-03-22 DIAGNOSIS — M99.06 SOMATIC DYSFUNCTION OF LOWER EXTREMITY: ICD-10-CM

## 2024-03-22 DIAGNOSIS — M99.07 SOMATIC DYSFUNCTION OF UPPER EXTREMITY: ICD-10-CM

## 2024-03-22 DIAGNOSIS — M99.01 SOMATIC DYSFUNCTION OF SPINE, CERVICAL: ICD-10-CM

## 2024-03-22 DIAGNOSIS — M99.09 SEGMENTAL AND SOMATIC DYSFUNCTION OF ABDOMEN AND OTHER REGIONS: ICD-10-CM

## 2024-03-22 DIAGNOSIS — M99.05 SOMATIC DYSFUNCTION OF PELVIC REGION: ICD-10-CM

## 2024-03-22 DIAGNOSIS — M25.511 CHRONIC RIGHT SHOULDER PAIN: ICD-10-CM

## 2024-03-22 DIAGNOSIS — F22 PARANOIA: Primary | ICD-10-CM

## 2024-03-22 DIAGNOSIS — M99.08 SEGMENTAL AND SOMATIC DYSFUNCTION OF RIB CAGE: ICD-10-CM

## 2024-03-22 DIAGNOSIS — G89.29 CHRONIC RIGHT SHOULDER PAIN: ICD-10-CM

## 2024-03-22 DIAGNOSIS — M99.02 SOMATIC DYSFUNCTION OF SPINE, THORACIC: ICD-10-CM

## 2024-03-22 DIAGNOSIS — M54.2 NECK PAIN ON RIGHT SIDE: ICD-10-CM

## 2024-03-22 DIAGNOSIS — M99.04 SOMATIC DYSFUNCTION OF SPINE, SACRAL: ICD-10-CM

## 2024-03-22 PROCEDURE — 99214 OFFICE O/P EST MOD 30 MIN: CPT | Performed by: FAMILY MEDICINE

## 2024-03-22 PROCEDURE — 98929 OSTEOPATH MANJ 9-10 REGIONS: CPT | Performed by: FAMILY MEDICINE

## 2024-03-22 NOTE — PROGRESS NOTES
"Chief Complaint  Shoulder Pain (Right shoulder) and Medication Problem (Pt states buspar makes her feel paranoid )    Subjective        Mandy Barrera presents to [facility]  History of Present Illness  Had to DC buspirone due to paranoia, resolved currently after stopping med  Still having chronic moderate aching R shoulder pain, bothers neck as well  Declines needing further anxiety supportr at the moment    Objective   Vital Signs:  /87   Pulse 89   Temp 98.9 °F (37.2 °C)   Resp 18   Ht 160 cm (63\")   Wt 77.6 kg (171 lb)   SpO2 99%   BMI 30.29 kg/m²   Estimated body mass index is 30.29 kg/m² as calculated from the following:    Height as of this encounter: 160 cm (63\").    Weight as of this encounter: 77.6 kg (171 lb).               Physical Exam  Vitals and nursing note reviewed.   Constitutional:       General: She is not in acute distress.     Appearance: She is not diaphoretic.   HENT:      Head: Normocephalic and atraumatic.      Nose: Nose normal.   Eyes:      General: No scleral icterus.        Right eye: No discharge.         Left eye: No discharge.      Conjunctiva/sclera: Conjunctivae normal.   Neck:      Trachea: No tracheal deviation.   Pulmonary:      Effort: Pulmonary effort is normal.   Skin:     General: Skin is warm and dry.      Coloration: Skin is not pale.   Neurological:      Mental Status: She is alert and oriented to person, place, and time.   Psychiatric:         Behavior: Behavior normal.         Thought Content: Thought content normal.         Judgment: Judgment normal.      Osteopathic Structural Exam  Procedure Note for Osteopathic Manipulative Treatment    Pre-procedure diagnoses: Somatic dysfunctions as listed below.  Consent: Oral consent given for Osteopathic Treatment  Post-procedure diagnoses: same  Complications of procedure: none, patient tolerated procedure well    The evaluation including the history, physical exam and the " management decisions, indicate than an appropriate intervention on this date of service is osteopathic manipulative treatment. Oral permission for the osteopathic procedure was obtained. The following treatment methods and the responses for each body region are listed below.        Region Somatic Dysfunction Severity OMT technique Response      Cervical C5 FRSL Moderate Balanced ligamentous tension Improved      Thoracic  T6 ERSR  T2 FRSL Moderate  Balanced ligamentous tension  Improved       Sacral L on L Moderate Balanced ligamentous tension Improved   Pelvic R anterior rotation Moderate Balanced ligamentous tension Improved      Lower Extremities  R psoas spasm  Moderate  Balanced ligamentous tension Improved       Upper Extremities  R pec minor spasm with R humerus internal rotation  Moderate  Balanced ligamentous tension  Improved       Rib Cage  R rib 4 exhalation somatic dysfunction  Moderate  Balanced ligamentous tension  Improved       Abdomen & Other Sites  thoracic diaphragm inhalation SD Moderate  Myofascial Release Improved        Result Review :                     Assessment and Plan     Diagnoses and all orders for this visit:    1. Paranoia (Primary)    2. Neck pain on right side    3. Chronic right shoulder pain    4. Somatic dysfunction of spine, cervical    5. Somatic dysfunction of spine, thoracic    6. Somatic dysfunction of spine, sacral    7. Somatic dysfunction of pelvic region    8. Segmental and somatic dysfunction of rib cage    9. Segmental and somatic dysfunction of abdomen and other regions    10. Somatic dysfunction of upper extremity    11. Somatic dysfunction of lower extremity    DC buspirone  Continue lexapro  OMT to balance autonomic tone, improve fascial symmetry and respiratory/circulatory/lymphatic compliance  F/u 6 weeks

## 2024-05-01 ENCOUNTER — OFFICE VISIT (OUTPATIENT)
Dept: FAMILY MEDICINE CLINIC | Facility: CLINIC | Age: 44
End: 2024-05-01
Payer: COMMERCIAL

## 2024-05-01 VITALS
TEMPERATURE: 97.5 F | BODY MASS INDEX: 30.29 KG/M2 | OXYGEN SATURATION: 99 % | HEART RATE: 111 BPM | RESPIRATION RATE: 20 BRPM | HEIGHT: 63 IN

## 2024-05-01 DIAGNOSIS — F33.2 SEVERE EPISODE OF RECURRENT MAJOR DEPRESSIVE DISORDER, WITHOUT PSYCHOTIC FEATURES: Primary | ICD-10-CM

## 2024-05-01 DIAGNOSIS — R73.03 PREDIABETES: ICD-10-CM

## 2024-05-01 LAB
EXPIRATION DATE: ABNORMAL
HBA1C MFR BLD: 6.3 % (ref 4.5–5.7)
Lab: ABNORMAL

## 2024-05-01 PROCEDURE — 99214 OFFICE O/P EST MOD 30 MIN: CPT | Performed by: FAMILY MEDICINE

## 2024-05-01 PROCEDURE — 83036 HEMOGLOBIN GLYCOSYLATED A1C: CPT | Performed by: FAMILY MEDICINE

## 2024-05-01 NOTE — PROGRESS NOTES
"Chief Complaint  Medication Problem (Pt states wanting to switch medications and wants to switch provides, see dr stevenson full time now and take over medication. ) and Nausea (Pt states every morning is feeling bad and sick to stomach feels like sugar is down, bought a meter since )    Subjective        Mandy Barrera presents to [facility]  History of Present Illness  Mood not controlled on lexapro and trazodone  Failed buspirone, caused hallucinations  Struggling with severe depression, tearful and crying every day, 1 episode of passive suicidal ideation.  Blood sugars have been in the low 100s frequently when checking at home, A1c today 6.3    Objective   Vital Signs:  Pulse 111   Temp 97.5 °F (36.4 °C)   Resp 20   Ht 160 cm (63\")   SpO2 99%   BMI 30.29 kg/m²   Estimated body mass index is 30.29 kg/m² as calculated from the following:    Height as of this encounter: 160 cm (63\").    Weight as of 3/22/24: 77.6 kg (171 lb).               Physical Exam  Vitals and nursing note reviewed.   Constitutional:       General: She is not in acute distress.     Appearance: She is not diaphoretic.   HENT:      Head: Normocephalic and atraumatic.      Nose: Nose normal.   Eyes:      General: No scleral icterus.        Right eye: No discharge.         Left eye: No discharge.      Conjunctiva/sclera: Conjunctivae normal.   Neck:      Trachea: No tracheal deviation.   Pulmonary:      Effort: Pulmonary effort is normal.   Skin:     General: Skin is warm and dry.      Coloration: Skin is not pale.   Neurological:      Mental Status: She is alert and oriented to person, place, and time.   Psychiatric:         Mood and Affect: Mood is depressed. Affect is tearful.         Behavior: Behavior normal.         Thought Content: Thought content normal. Thought content does not include suicidal ideation. Thought content does not include suicidal plan.         Judgment: Judgment normal.        Result " Review :                     Assessment and Plan     Diagnoses and all orders for this visit:    1. Severe episode of recurrent major depressive disorder, without psychotic features (Primary)  -     Dextromethorphan-buPROPion ER (AUVELITY)  MG tablet controlled-release; Take 1 tablet by mouth Every Morning.  Dispense: 30 tablet; Refill: 2    2. Prediabetes  -     POC Glycosylated Hemoglobin (Hb A1C)    Add Auvelity to Lexapro, follow-up 4 to 6 weeks         Follow Up     Return in about 4 weeks (around 5/29/2024).  Patient was given instructions and counseling regarding her condition or for health maintenance advice. Please see specific information pulled into the AVS if appropriate.

## 2024-05-23 ENCOUNTER — OFFICE VISIT (OUTPATIENT)
Dept: FAMILY MEDICINE CLINIC | Facility: CLINIC | Age: 44
End: 2024-05-23
Payer: COMMERCIAL

## 2024-05-23 VITALS
SYSTOLIC BLOOD PRESSURE: 124 MMHG | RESPIRATION RATE: 18 BRPM | DIASTOLIC BLOOD PRESSURE: 82 MMHG | HEART RATE: 94 BPM | HEIGHT: 63 IN | TEMPERATURE: 98.2 F | WEIGHT: 176 LBS | BODY MASS INDEX: 31.18 KG/M2 | OXYGEN SATURATION: 96 %

## 2024-05-23 DIAGNOSIS — H69.93 DYSFUNCTION OF BOTH EUSTACHIAN TUBES: ICD-10-CM

## 2024-05-23 DIAGNOSIS — H60.501 ACUTE OTITIS EXTERNA OF RIGHT EAR, UNSPECIFIED TYPE: Primary | ICD-10-CM

## 2024-05-23 DIAGNOSIS — H65.92 MIDDLE EAR EFFUSION, LEFT: ICD-10-CM

## 2024-05-23 PROCEDURE — 99213 OFFICE O/P EST LOW 20 MIN: CPT | Performed by: NURSE PRACTITIONER

## 2024-05-23 RX ORDER — METHYLPREDNISOLONE 4 MG/1
TABLET ORAL
Qty: 21 TABLET | Refills: 0 | Status: SHIPPED | OUTPATIENT
Start: 2024-05-23

## 2024-05-23 RX ORDER — FLUTICASONE PROPIONATE 50 MCG
2 SPRAY, SUSPENSION (ML) NASAL DAILY
Qty: 16 G | Refills: 5 | Status: SHIPPED | OUTPATIENT
Start: 2024-05-23

## 2024-05-23 RX ORDER — CIPROFLOXACIN AND DEXAMETHASONE 3; 1 MG/ML; MG/ML
4 SUSPENSION/ DROPS AURICULAR (OTIC) 2 TIMES DAILY
Qty: 7.5 ML | Refills: 0 | Status: SHIPPED | OUTPATIENT
Start: 2024-05-23

## 2024-05-23 NOTE — PROGRESS NOTES
ARA Guerra  [facility]   Family Medicine  [address]  [place] [address]  Phone: [phone]  Fax: [phone]         Chief Complaint:  Chief Complaint   Patient presents with    Earache     In the right ear symptoms started two days ago        History:  Mandy Barrera is a 43 y.o. female that is an established patient. She  is here for evaluation of the above complaint.    HPI     Right ear pain for 2 days. She describes the pain as stabbing, keeping her up last night. No fever, some chills today. She has a history of serous fluid behind the bilateral tympanic membranes. She's used Flonase in the past. No cough, headache, some sore throat on the right side this morning. No n/v/d.             ROS:  Review of Systems   Constitutional:  Positive for chills. Negative for fever.   HENT:  Positive for ear pain and sore throat. Negative for sinus pressure and sinus pain.    Respiratory:  Negative for cough.    Gastrointestinal:  Negative for diarrhea, nausea and vomiting.         reports that she has never smoked. She has never used smokeless tobacco. She reports that she does not drink alcohol and does not use drugs.    Current Outpatient Medications   Medication Instructions    albuterol sulfate  (90 Base) MCG/ACT inhaler Inhale 2 puffs every 4 hours by inhalation route.    ciprofloxacin-dexAMETHasone (Ciprodex) 0.3-0.1 % otic suspension 4 drops, Right Ear, 2 Times Daily    clobetasol (TEMOVATE) 0.05 % ointment Apply twice a day to affected areas on breast for eczema.    Dextromethorphan-buPROPion ER (AUVELITY)  MG tablet controlled-release 1 tablet, Oral, Every Morning    escitalopram (LEXAPRO) 20 MG tablet Take 1 tablet by mouth every day.    fluticasone (FLONASE) 50 MCG/ACT nasal spray Use 2 sprays into the nostril as directed by provider Daily.    ibuprofen (ADVIL,MOTRIN) 800 mg, Oral, 3 Times Daily With Meals    methylPREDNISolone (MEDROL) 4 MG dose pack  "Take as directed on package instructions.    pantoprazole (PROTONIX) 20 mg, Oral, 2 Times Daily    traZODone (DESYREL) 50 mg, Oral, Daily    ubrogepant (ubrogepant) 100 MG tablet Take 1 tablet by mouth at onset of migraine may repeat in 2 hours if needed max 2 tablets in 24 hours    Wegovy 0.25 MG/0.5ML solution auto-injector 0.5 mL, Subcutaneous, Weekly       OBJECTIVE:  /82   Pulse 94   Temp 98.2 °F (36.8 °C) (Temporal)   Resp 18   Ht 160 cm (63\")   Wt 79.8 kg (176 lb)   SpO2 96%   BMI 31.18 kg/m²    Physical Exam  Vitals and nursing note reviewed.   Constitutional:       Appearance: Normal appearance.   HENT:      Head: Normocephalic and atraumatic.      Right Ear: Hearing and tympanic membrane normal. No decreased hearing noted. Tenderness present. There is no impacted cerumen.      Left Ear: Hearing and tympanic membrane normal. No decreased hearing noted. There is no impacted cerumen.      Ears:      Comments: Right ear      Nose: Nose normal.      Mouth/Throat:      Pharynx: No oropharyngeal exudate or posterior oropharyngeal erythema.   Eyes:      Conjunctiva/sclera: Conjunctivae normal.   Cardiovascular:      Rate and Rhythm: Normal rate and regular rhythm.   Pulmonary:      Effort: Pulmonary effort is normal. No respiratory distress.      Breath sounds: Normal breath sounds. No wheezing or rales.   Neurological:      General: No focal deficit present.      Mental Status: She is alert and oriented to person, place, and time.   Psychiatric:         Mood and Affect: Mood normal.         Behavior: Behavior normal.         Procedures    Assessment/Plan:     Diagnoses and all orders for this visit:    1. Acute otitis externa of right ear, unspecified type (Primary)  -     methylPREDNISolone (MEDROL) 4 MG dose pack; Take as directed on package instructions.  Dispense: 21 tablet; Refill: 0  -     ciprofloxacin-dexAMETHasone (Ciprodex) 0.3-0.1 % otic suspension; Administer 4 drops to the " right ear 2 (Two) Times a Day.  Dispense: 7.5 mL; Refill: 0    2. Middle ear effusion, left  -     fluticasone (FLONASE) 50 MCG/ACT nasal spray; Use 2 sprays into the nostril as directed by provider Daily.  Dispense: 16 g; Refill: 5    3. Dysfunction of both eustachian tubes  -     fluticasone (FLONASE) 50 MCG/ACT nasal spray; Use 2 sprays into the nostril as directed by provider Daily.  Dispense: 16 g; Refill: 5             An After Visit Summary was printed and given to the patient at discharge.  No follow-ups on file.       There are no Patient Instructions on file for this visit.      Discussion:         I spent 25 minutes caring for Mandy on this date of service. This time includes time spent by me in the following activities: preparing for the visit, reviewing tests, obtaining and/or reviewing a separately obtained history, performing a medically appropriate examination and/or evaluation, counseling and educating the patient/family/caregiver, ordering medications, tests, or procedures, documenting information in the medical record, and independently interpreting results and communicating that information with the patient/family/caregiver     Mei BULLOCK 5/23/2024   Electronically signed.

## 2024-07-30 ENCOUNTER — HOSPITAL ENCOUNTER (EMERGENCY)
Facility: HOSPITAL | Age: 44
Discharge: HOME | End: 2024-07-30
Attending: INTERNAL MEDICINE
Payer: COMMERCIAL

## 2024-07-30 VITALS
SYSTOLIC BLOOD PRESSURE: 140 MMHG | TEMPERATURE: 98.8 F | WEIGHT: 175 LBS | BODY MASS INDEX: 31.01 KG/M2 | HEIGHT: 63 IN | DIASTOLIC BLOOD PRESSURE: 96 MMHG | OXYGEN SATURATION: 96 % | HEART RATE: 61 BPM | RESPIRATION RATE: 18 BRPM

## 2024-07-30 DIAGNOSIS — L02.91 ABSCESS: Primary | ICD-10-CM

## 2024-07-30 PROCEDURE — 99283 EMERGENCY DEPT VISIT LOW MDM: CPT

## 2024-07-30 RX ORDER — CLINDAMYCIN HYDROCHLORIDE 150 MG/1
300 CAPSULE ORAL ONCE
Status: COMPLETED | OUTPATIENT
Start: 2024-07-30 | End: 2024-07-30

## 2024-07-30 RX ORDER — SACCHAROMYCES BOULARDII 250 MG
250 CAPSULE ORAL 2 TIMES DAILY
Qty: 30 CAPSULE | Refills: 0 | Status: SHIPPED | OUTPATIENT
Start: 2024-07-30

## 2024-07-30 RX ORDER — CLINDAMYCIN HYDROCHLORIDE 300 MG/1
300 CAPSULE ORAL 4 TIMES DAILY
Qty: 28 CAPSULE | Refills: 0 | Status: SHIPPED | OUTPATIENT
Start: 2024-07-30

## 2024-07-30 RX ADMIN — CLINDAMYCIN HYDROCHLORIDE 300 MG: 150 CAPSULE ORAL at 20:02

## 2024-07-31 NOTE — DISCHARGE INSTRUCTIONS
Please follow-up with Dr. Lockhart this week.  Return to the emergency department if your symptoms worsen.  Antibiotics were called into your pharmacy this evening as we discussed.  If this red nodule does not improve with antibiotics, you may need to have the area biopsied.  You will need to have outpatient follow-up for this.

## 2024-07-31 NOTE — ED PROVIDER NOTES
Care Transitions case created; awaiting discharge to begin outreach.      Subjective   History of Present Illness  43-year-old female who presents to the emergency department for possible abscess.  She states she is concerned she has an abscess in her gumline.  Last week she went to have her teeth cleaned.  She had a fever and felt like her gums may be inflamed.  It improved, but she notes that it is started to come back again today.  She notes some pain and pressure in the right nasal labial fold area.  She does feel like her neck is a little swollen and tender on that side.    Review of Systems   HENT:  Positive for dental problem and facial swelling.        Past Medical History:   Diagnosis Date    Cerebral aneurysm     GERD (gastroesophageal reflux disease)     H/O: hysterectomy     Hypertension     Iron deficiency anemia     Seizure        Allergies   Allergen Reactions    Infed [Iron Dextran] Anaphylaxis     Throat tightness    Hydromorphone Hcl Itching    Rafiq Butter Hives     Allergic to rafiq fruit     Ampicillin Rash    Lamotrigine Rash    Latex Rash    Lortab [Hydrocodone-Acetaminophen] Rash    Morphine Rash    Nitrofurantoin Rash    Nuts Rash    Poison Ivy Extract Rash       Past Surgical History:   Procedure Laterality Date    APPENDECTOMY       SECTION      x2    CHOLECYSTECTOMY      COLONOSCOPY      CRANIOTOMY      ENDOMETRIAL ABLATION      HYSTERECTOMY         Family History   Problem Relation Age of Onset    Rheum arthritis Mother     No Known Problems Brother     No Known Problems Sister     No Known Problems Daughter     No Known Problems Brother     No Known Problems Brother     No Known Problems Brother     No Known Problems Daughter     Breast cancer Maternal Great-Grandmother        Social History     Socioeconomic History    Marital status: Single   Tobacco Use    Smoking status: Never    Smokeless tobacco: Never   Vaping Use    Vaping status: Never Used   Substance and Sexual Activity    Alcohol use: No    Drug use: No    Sexual activity: Yes      Partners: Male           Objective   Physical Exam  Vitals reviewed.   Constitutional:       Appearance: Normal appearance. She is not ill-appearing.   HENT:      Head: Normocephalic and atraumatic.      Right Ear: External ear normal.      Left Ear: External ear normal.      Nose: Nose normal.      Mouth/Throat:      Comments: The patient has a red area above the right canine to slightly swollen.  And slightly bulging.  No obvious drainage.  She is slightly tender in her submandibular area more on the right than the left.  Eyes:      Conjunctiva/sclera: Conjunctivae normal.   Cardiovascular:      Rate and Rhythm: Normal rate and regular rhythm.      Heart sounds: Normal heart sounds.   Pulmonary:      Effort: Pulmonary effort is normal.      Breath sounds: Normal breath sounds.   Musculoskeletal:         General: No tenderness.      Cervical back: Normal range of motion and neck supple.   Skin:     General: Skin is warm and dry.   Neurological:      Mental Status: She is alert and oriented to person, place, and time.      Cranial Nerves: No cranial nerve deficit.   Psychiatric:         Mood and Affect: Mood normal.         Behavior: Behavior normal.         Procedures           ED Course      Please follow-up with Dr. Lockhart this week.  Return to the emergency department if your symptoms worsen.  Antibiotics were called into your pharmacy this evening as we discussed.  If this red nodule does not improve with antibiotics, you may need to have the area biopsied. You will need to have outpatient follow-up for this.                                        Medical Decision Making  Differential diagnosis includes soft tissue abscess, cancer, sinus infection    Risk  Prescription drug management.        Final diagnoses:   Abscess       ED Disposition  ED Disposition       ED Disposition   Discharge    Condition   Stable    Comment   --               Stefano Lockhart,   [address] [address]  [address]  [facility] [address]  [phone]    In 2 days           Medication List        New Prescriptions      clindamycin 300 MG capsule  Commonly known as: CLEOCIN  Take 1 capsule by mouth 4 (Four) Times a Day.     saccharomyces boulardii 250 MG capsule  Commonly known as: FLORASTOR  Take 1 capsule by mouth 2 (Two) Times a Day.               Where to Get Your Medications        These medications were sent to [facility] - [address], [facility] [address]      Hours: Monday to Friday 7 AM to 5 PM (Closed 12:30 PM to 1 PM) Phone: [phone]   clindamycin 300 MG capsule  saccharomyces boulardii 250 MG capsule            Alena Alves,   07/30/24 1932       Alena Alves,   07/30/24 1932

## 2024-11-17 ENCOUNTER — HOSPITAL ENCOUNTER (EMERGENCY)
Facility: HOSPITAL | Age: 44
Discharge: HOME | End: 2024-11-17
Attending: FAMILY MEDICINE | Admitting: FAMILY MEDICINE
Payer: COMMERCIAL

## 2024-11-17 ENCOUNTER — APPOINTMENT (OUTPATIENT)
Dept: GENERAL RADIOLOGY | Facility: HOSPITAL | Age: 44
End: 2024-11-17
Payer: COMMERCIAL

## 2024-11-17 VITALS
RESPIRATION RATE: 20 BRPM | HEART RATE: 88 BPM | HEIGHT: 63 IN | DIASTOLIC BLOOD PRESSURE: 88 MMHG | WEIGHT: 179.5 LBS | TEMPERATURE: 98.7 F | BODY MASS INDEX: 31.8 KG/M2 | SYSTOLIC BLOOD PRESSURE: 135 MMHG | OXYGEN SATURATION: 98 %

## 2024-11-17 DIAGNOSIS — M25.511 ACUTE PAIN OF RIGHT SHOULDER: Primary | ICD-10-CM

## 2024-11-17 PROCEDURE — 99283 EMERGENCY DEPT VISIT LOW MDM: CPT

## 2024-11-17 PROCEDURE — 25010000002 KETOROLAC TROMETHAMINE PER 15 MG: Performed by: FAMILY MEDICINE

## 2024-11-17 PROCEDURE — 73030 X-RAY EXAM OF SHOULDER: CPT

## 2024-11-17 PROCEDURE — 96372 THER/PROPH/DIAG INJ SC/IM: CPT

## 2024-11-17 RX ORDER — ORPHENADRINE CITRATE 100 MG/1
100 TABLET ORAL 2 TIMES DAILY
Qty: 28 TABLET | Refills: 0 | Status: SHIPPED | OUTPATIENT
Start: 2024-11-17 | End: 2024-12-03

## 2024-11-17 RX ORDER — KETOROLAC TROMETHAMINE 30 MG/ML
30 INJECTION, SOLUTION INTRAMUSCULAR; INTRAVENOUS ONCE
Status: COMPLETED | OUTPATIENT
Start: 2024-11-17 | End: 2024-11-17

## 2024-11-17 RX ADMIN — KETOROLAC TROMETHAMINE 30 MG: 30 INJECTION, SOLUTION INTRAMUSCULAR; INTRAVENOUS at 18:07

## 2024-11-17 NOTE — ED PROVIDER NOTES
Subjective   History of Present Illness  43-year-old male presents emergency room with right-sided shoulder pain.  She states that it began about 1520 minutes ago prior to arrival to the emergency room.  She denies any injury or trauma.  No chest pain or shortness of breath.  She states that the pain radiates up to her right side of the neck.  No loss of sensation of her extremity.  No weakness of the extremity.  Patient denies any other symptoms at this time.      Review of Systems   All other systems reviewed and are negative.      Past Medical History:   Diagnosis Date    Arthritis     Cerebral aneurysm     GERD (gastroesophageal reflux disease)     H/O: hysterectomy     Hypertension     Iron deficiency anemia     Seizure        Allergies   Allergen Reactions    Infed [Iron Dextran] Anaphylaxis     Throat tightness    Hydromorphone Hcl Itching    Rafiq Butter Hives     Allergic to rafiq fruit     Ampicillin Rash    Lamotrigine Rash    Latex Rash    Lortab [Hydrocodone-Acetaminophen] Rash    Morphine Rash    Nitrofurantoin Rash    Nuts Rash    Poison Ivy Extract Rash       Past Surgical History:   Procedure Laterality Date    APPENDECTOMY       SECTION      x2    CHOLECYSTECTOMY      COLONOSCOPY      CRANIOTOMY      ENDOMETRIAL ABLATION      HYSTERECTOMY         Family History   Problem Relation Age of Onset    Rheum arthritis Mother     No Known Problems Brother     No Known Problems Sister     No Known Problems Daughter     No Known Problems Brother     No Known Problems Brother     No Known Problems Brother     No Known Problems Daughter     Breast cancer Maternal Great-Grandmother        Social History     Socioeconomic History    Marital status: Single   Tobacco Use    Smoking status: Never    Smokeless tobacco: Never   Vaping Use    Vaping status: Never Used   Substance and Sexual Activity    Alcohol use: No    Drug use: No    Sexual activity: Yes     Partners: Male           Objective   Physical  Exam  Vitals and nursing note reviewed.   Constitutional:       Appearance: Normal appearance.   HENT:      Head: Normocephalic and atraumatic.      Mouth/Throat:      Mouth: Mucous membranes are moist.   Eyes:      Extraocular Movements: Extraocular movements intact.      Pupils: Pupils are equal, round, and reactive to light.   Cardiovascular:      Rate and Rhythm: Normal rate and regular rhythm.      Heart sounds: Normal heart sounds.   Pulmonary:      Effort: Pulmonary effort is normal.      Breath sounds: Normal breath sounds.   Abdominal:      General: Bowel sounds are normal.      Palpations: Abdomen is soft.      Tenderness: There is no abdominal tenderness.   Musculoskeletal:      Right shoulder: Tenderness present. No swelling, deformity or bony tenderness. Normal range of motion.   Skin:     General: Skin is warm and dry.   Neurological:      General: No focal deficit present.      Mental Status: She is alert and oriented to person, place, and time.   Psychiatric:         Mood and Affect: Mood normal.         Behavior: Behavior normal.         Procedures           ED Course  ED Course as of 11/17/24 1831   Sun Nov 17, 2024   1728 XR Shoulder 2+ View Right [RP]      ED Course User Index  [RP] Sean Almonte MD                                                     XR Shoulder 2+ View Right   Final Result   Impression:    1. Unremarkable radiographs of the right shoulder.               This report was signed and finalized on 11/17/2024 5:19 PM by Dr Ravin Tapia.            Medications   ketorolac (TORADOL) injection 30 mg (30 mg Intramuscular Given 11/17/24 1807)       Medical Decision Making  I had a discussion with the patient regarding diagnosis, diagnostic results, treatment plan, and medications.  The patient indicated understanding of these instructions.  I spent adequate time at the bedside prior to discharge necessary to discuss the aftercare instructions, giving patient education, providing  explanations of the results of our evaluations/findings, and my decision making to assure that the patient understand the plan of care.  Time was allotted to answer questions at that time and throughout the ED course.  Emphasis was placed on timely follow-up after discharge.  I also discussed the potential for the development of an acute emergent condition requiring further evaluation, return to the ER, admission, or even surgical intervention. I discussed that we found nothing during the visit today indicating the need for further ER workup at this time, admission to the hospital, or the presence of an acute unstable medical condition.  I encouraged the patient to return to the emergency department immediately for ANY concerns, worsening, new complaints, or if symptoms persist and unable to seek follow-up in a timely fashion.  The patient expressed understanding and agreement with this plan.    Problems Addressed:  Acute pain of right shoulder: complicated acute illness or injury    Amount and/or Complexity of Data Reviewed  Radiology: ordered. Decision-making details documented in ED Course.    Risk  Prescription drug management.        Final diagnoses:   Acute pain of right shoulder       ED Disposition  ED Disposition       ED Disposition   Discharge    Condition   Stable    Comment   --               Stefano Lockhart, DO  [address] [facility] [address]  [address]  [facility] [address]  [phone]    Schedule an appointment as soon as possible for a visit       [facility] EMERGENCY DEPARTMENT  [address] [place] [id number] [phone]    As needed, If symptoms worsen         Medication List        New Prescriptions      orphenadrine 100 MG 12 hr tablet  Commonly known as: NORFLEX  Take 1 tablet by mouth 2 (Two) Times a Day for 14 days.            Changed      traZODone 50 MG tablet  Commonly known as: DESYREL  Take 1 tablet by mouth Daily.  What changed:   when to take  this  reasons to take this               Where to Get Your Medications        These medications were sent to [facility] - [address], [place] [address]      Hours: Monday to Friday 7 AM to 5 PM (Closed 12:30 PM to 1 PM) Phone: [phone]   orphenadrine 100 MG 12 hr tablet            Sean Almonte MD  11/17/24 5372

## 2024-11-22 ENCOUNTER — OFFICE VISIT (OUTPATIENT)
Dept: FAMILY MEDICINE CLINIC | Facility: CLINIC | Age: 44
End: 2024-11-22
Payer: COMMERCIAL

## 2024-11-22 VITALS
HEIGHT: 63 IN | WEIGHT: 182.2 LBS | BODY MASS INDEX: 32.28 KG/M2 | HEART RATE: 121 BPM | SYSTOLIC BLOOD PRESSURE: 130 MMHG | DIASTOLIC BLOOD PRESSURE: 80 MMHG | TEMPERATURE: 97.3 F | OXYGEN SATURATION: 98 %

## 2024-11-22 DIAGNOSIS — G89.29 CHRONIC RIGHT SHOULDER PAIN: Primary | ICD-10-CM

## 2024-11-22 DIAGNOSIS — G43.909 MIGRAINE WITHOUT STATUS MIGRAINOSUS, NOT INTRACTABLE, UNSPECIFIED MIGRAINE TYPE: ICD-10-CM

## 2024-11-22 DIAGNOSIS — M99.06 SOMATIC DYSFUNCTION OF LOWER EXTREMITY: ICD-10-CM

## 2024-11-22 DIAGNOSIS — M99.03 SOMATIC DYSFUNCTION OF SPINE, LUMBAR: ICD-10-CM

## 2024-11-22 DIAGNOSIS — M99.04 SOMATIC DYSFUNCTION OF SPINE, SACRAL: ICD-10-CM

## 2024-11-22 DIAGNOSIS — M99.08 SEGMENTAL AND SOMATIC DYSFUNCTION OF RIB CAGE: ICD-10-CM

## 2024-11-22 DIAGNOSIS — M25.511 CHRONIC RIGHT SHOULDER PAIN: Primary | ICD-10-CM

## 2024-11-22 DIAGNOSIS — M99.07 SOMATIC DYSFUNCTION OF UPPER EXTREMITY: ICD-10-CM

## 2024-11-22 DIAGNOSIS — G89.29 CHRONIC RIGHT-SIDED LOW BACK PAIN WITHOUT SCIATICA: ICD-10-CM

## 2024-11-22 DIAGNOSIS — M54.50 CHRONIC RIGHT-SIDED LOW BACK PAIN WITHOUT SCIATICA: ICD-10-CM

## 2024-11-22 DIAGNOSIS — M99.05 SOMATIC DYSFUNCTION OF PELVIC REGION: ICD-10-CM

## 2024-11-22 DIAGNOSIS — M99.02 SOMATIC DYSFUNCTION OF SPINE, THORACIC: ICD-10-CM

## 2024-11-22 DIAGNOSIS — M99.09 SEGMENTAL AND SOMATIC DYSFUNCTION OF ABDOMEN AND OTHER REGIONS: ICD-10-CM

## 2024-11-23 NOTE — PROGRESS NOTES
"Chief Complaint  Back Pain (Patient presents to clinic for treatment of right lower back pain) and Shoulder Pain (Patient presents to clinic for treatment of right shoulder pain)    Subjective        Mandy Barrera presents to [facility]  Back Pain      Still having R sided low back pain and aching R shoulder pain in the anterior joint. Has been unable to go to PT due to lack of transportation. Mild pain today, moderate aching usually. No radicular symptoms    Objective   Vital Signs:  /80 (BP Location: Right arm, Patient Position: Sitting, Cuff Size: Adult)   Pulse (!) 121   Temp 97.3 °F (36.3 °C) (Temporal)   Ht 160 cm (63\")   Wt 82.6 kg (182 lb 3.2 oz)   SpO2 98%   BMI 32.28 kg/m²   Estimated body mass index is 32.28 kg/m² as calculated from the following:    Height as of this encounter: 160 cm (63\").    Weight as of this encounter: 82.6 kg (182 lb 3.2 oz).          Physical Exam  Vitals and nursing note reviewed.   Constitutional:       General: She is not in acute distress.     Appearance: She is not diaphoretic.   HENT:      Head: Normocephalic and atraumatic.      Nose: Nose normal.   Eyes:      General: No scleral icterus.        Right eye: No discharge.         Left eye: No discharge.      Conjunctiva/sclera: Conjunctivae normal.   Neck:      Trachea: No tracheal deviation.   Pulmonary:      Effort: Pulmonary effort is normal.   Skin:     General: Skin is warm and dry.      Coloration: Skin is not pale.   Neurological:      Mental Status: She is alert and oriented to person, place, and time.   Psychiatric:         Behavior: Behavior normal.         Thought Content: Thought content normal.         Judgment: Judgment normal.      Osteopathic Structural Exam  Procedure Note for Osteopathic Manipulative Treatment    Pre-procedure diagnoses: Somatic dysfunctions as listed below.  Consent: Oral consent given for Osteopathic Treatment  Post-procedure diagnoses: " same  Complications of procedure: none, patient tolerated procedure well    The evaluation including the history, physical exam and the management decisions, indicate than an appropriate intervention on this date of service is osteopathic manipulative treatment. Oral permission for the osteopathic procedure was obtained. The following treatment methods and the responses for each body region are listed below.        Region Somatic Dysfunction Severity OMT technique Response      Thoracic  T2 FRSR  T4 ERSR   Moderate  Balanced ligamentous tension  Improved       Lumbar L2 FRSR  L4 FRSR  L5 ERSL Moderate Balanced ligamentous tension Improved      Sacral L on L Moderate Balanced ligamentous tension Improved   Pelvic R anterior rotation Moderate Balanced ligamentous tension Improved      Lower Extremities  R iliacus spasm  Moderate  Balanced ligamentous tension Improved       Upper Extremities  R humerus internal rotation  Moderate  Balanced ligamentous tension  Improved       Rib Cage  R  rib 2 exhalation somatic dysfunction  Moderate  Balanced ligamentous tension  Improved       Abdomen & Other Sites  Thoracic diaphragm R crura spasm Moderate  Myofascial Release Improved        Result Review :                Assessment and Plan   Diagnoses and all orders for this visit:    1. Chronic right shoulder pain (Primary)    2. Migraine without status migrainosus, not intractable, unspecified migraine type  -     ubrogepant (UBRELVY) 100 MG tablet; Take 1 tablet by mouth As Needed (take as needed for prevention of migraine headaches).  Dispense: 16 tablet; Refill: 11    3. Chronic right-sided low back pain without sciatica    4. Somatic dysfunction of spine, thoracic    5. Somatic dysfunction of spine, lumbar    6. Somatic dysfunction of spine, sacral    7. Somatic dysfunction of pelvic region    8. Somatic dysfunction of lower extremity    9. Somatic dysfunction of upper extremity    10. Segmental and somatic dysfunction of  abdomen and other regions    11. Segmental and somatic dysfunction of rib cage    Need refill of migraine med  Continue ibuprofen PRN  OMT to balance autonomic tone, improve fascial symmetry and respiratory/circulatory/lymphatic compliance  Encourage home PT, calling to reschedule when transportation available  F/u 4-6 weeks

## 2025-01-20 ENCOUNTER — HOSPITAL ENCOUNTER (EMERGENCY)
Facility: HOSPITAL | Age: 45
Discharge: HOME | End: 2025-01-20
Attending: FAMILY MEDICINE | Admitting: FAMILY MEDICINE
Payer: COMMERCIAL

## 2025-01-20 VITALS
WEIGHT: 172 LBS | TEMPERATURE: 98.4 F | SYSTOLIC BLOOD PRESSURE: 125 MMHG | BODY MASS INDEX: 30.48 KG/M2 | HEIGHT: 63 IN | DIASTOLIC BLOOD PRESSURE: 86 MMHG | OXYGEN SATURATION: 100 % | RESPIRATION RATE: 16 BRPM | HEART RATE: 76 BPM

## 2025-01-20 DIAGNOSIS — E87.6 HYPOKALEMIA: ICD-10-CM

## 2025-01-20 DIAGNOSIS — K52.9 GASTROENTERITIS: Primary | ICD-10-CM

## 2025-01-20 DIAGNOSIS — R10.84 GENERALIZED ABDOMINAL PAIN: ICD-10-CM

## 2025-01-20 LAB
ALBUMIN SERPL-MCNC: 4.3 G/DL (ref 3.5–5.2)
ALBUMIN/GLOB SERPL: 1.3 G/DL
ALP SERPL-CCNC: 73 U/L (ref 39–117)
ALT SERPL W P-5'-P-CCNC: 20 U/L (ref 1–33)
ANION GAP SERPL CALCULATED.3IONS-SCNC: 11 MMOL/L (ref 5–15)
AST SERPL-CCNC: 23 U/L (ref 1–32)
BASOPHILS # BLD AUTO: 0.03 10*3/MM3 (ref 0–0.2)
BASOPHILS NFR BLD AUTO: 0.4 % (ref 0–1.5)
BILIRUB SERPL-MCNC: 0.3 MG/DL (ref 0–1.2)
BILIRUB UR QL STRIP: NEGATIVE
BUN SERPL-MCNC: 11 MG/DL (ref 6–20)
BUN/CREAT SERPL: 16.9 (ref 7–25)
CALCIUM SPEC-SCNC: 8.9 MG/DL (ref 8.6–10.5)
CHLORIDE SERPL-SCNC: 102 MMOL/L (ref 98–107)
CLARITY UR: CLEAR
CO2 SERPL-SCNC: 24 MMOL/L (ref 22–29)
COLOR UR: YELLOW
CREAT SERPL-MCNC: 0.65 MG/DL (ref 0.57–1)
D-LACTATE SERPL-SCNC: 1 MMOL/L (ref 0.5–2)
DEPRECATED RDW RBC AUTO: 45.7 FL (ref 37–54)
EGFRCR SERPLBLD CKD-EPI 2021: 111.5 ML/MIN/1.73
EOSINOPHIL # BLD AUTO: 0.1 10*3/MM3 (ref 0–0.4)
EOSINOPHIL NFR BLD AUTO: 1.4 % (ref 0.3–6.2)
ERYTHROCYTE [DISTWIDTH] IN BLOOD BY AUTOMATED COUNT: 15 % (ref 12.3–15.4)
GLOBULIN UR ELPH-MCNC: 3.2 GM/DL
GLUCOSE SERPL-MCNC: 108 MG/DL (ref 65–99)
GLUCOSE UR STRIP-MCNC: NEGATIVE MG/DL
HCT VFR BLD AUTO: 38.9 % (ref 34–46.6)
HGB BLD-MCNC: 12.4 G/DL (ref 12–15.9)
HGB UR QL STRIP.AUTO: NEGATIVE
IMM GRANULOCYTES # BLD AUTO: 0.05 10*3/MM3 (ref 0–0.05)
IMM GRANULOCYTES NFR BLD AUTO: 0.7 % (ref 0–0.5)
KETONES UR QL STRIP: NEGATIVE
LEUKOCYTE ESTERASE UR QL STRIP.AUTO: NEGATIVE
LIPASE SERPL-CCNC: 38 U/L (ref 13–60)
LYMPHOCYTES # BLD AUTO: 2.54 10*3/MM3 (ref 0.7–3.1)
LYMPHOCYTES NFR BLD AUTO: 35.8 % (ref 19.6–45.3)
MAGNESIUM SERPL-MCNC: 2 MG/DL (ref 1.6–2.6)
MCH RBC QN AUTO: 26.8 PG (ref 26.6–33)
MCHC RBC AUTO-ENTMCNC: 31.9 G/DL (ref 31.5–35.7)
MCV RBC AUTO: 84.2 FL (ref 79–97)
MONOCYTES # BLD AUTO: 0.58 10*3/MM3 (ref 0.1–0.9)
MONOCYTES NFR BLD AUTO: 8.2 % (ref 5–12)
NEUTROPHILS NFR BLD AUTO: 3.79 10*3/MM3 (ref 1.7–7)
NEUTROPHILS NFR BLD AUTO: 53.5 % (ref 42.7–76)
NITRITE UR QL STRIP: NEGATIVE
NRBC BLD AUTO-RTO: 0 /100 WBC (ref 0–0.2)
PH UR STRIP.AUTO: 7 [PH] (ref 5–8)
PLATELET # BLD AUTO: 271 10*3/MM3 (ref 140–450)
PMV BLD AUTO: 10.2 FL (ref 6–12)
POTASSIUM SERPL-SCNC: 3.4 MMOL/L (ref 3.5–5.2)
PROT SERPL-MCNC: 7.5 G/DL (ref 6–8.5)
PROT UR QL STRIP: NEGATIVE
RBC # BLD AUTO: 4.62 10*6/MM3 (ref 3.77–5.28)
SODIUM SERPL-SCNC: 137 MMOL/L (ref 136–145)
SP GR UR STRIP: 1.01 (ref 1–1.03)
UROBILINOGEN UR QL STRIP: NORMAL
WBC NRBC COR # BLD AUTO: 7.09 10*3/MM3 (ref 3.4–10.8)

## 2025-01-20 PROCEDURE — 96374 THER/PROPH/DIAG INJ IV PUSH: CPT

## 2025-01-20 PROCEDURE — 83735 ASSAY OF MAGNESIUM: CPT | Performed by: FAMILY MEDICINE

## 2025-01-20 PROCEDURE — 99283 EMERGENCY DEPT VISIT LOW MDM: CPT

## 2025-01-20 PROCEDURE — 85025 COMPLETE CBC W/AUTO DIFF WBC: CPT | Performed by: FAMILY MEDICINE

## 2025-01-20 PROCEDURE — 96375 TX/PRO/DX INJ NEW DRUG ADDON: CPT

## 2025-01-20 PROCEDURE — 83690 ASSAY OF LIPASE: CPT | Performed by: FAMILY MEDICINE

## 2025-01-20 PROCEDURE — 25810000003 LACTATED RINGERS SOLUTION: Performed by: FAMILY MEDICINE

## 2025-01-20 PROCEDURE — 81003 URINALYSIS AUTO W/O SCOPE: CPT | Performed by: FAMILY MEDICINE

## 2025-01-20 PROCEDURE — 80053 COMPREHEN METABOLIC PANEL: CPT | Performed by: FAMILY MEDICINE

## 2025-01-20 PROCEDURE — 83605 ASSAY OF LACTIC ACID: CPT | Performed by: FAMILY MEDICINE

## 2025-01-20 PROCEDURE — 25010000002 ONDANSETRON PER 1 MG: Performed by: FAMILY MEDICINE

## 2025-01-20 RX ORDER — FAMOTIDINE 10 MG/ML
20 INJECTION, SOLUTION INTRAVENOUS ONCE
Status: COMPLETED | OUTPATIENT
Start: 2025-01-20 | End: 2025-01-20

## 2025-01-20 RX ORDER — FLUCONAZOLE 150 MG/1
150 TABLET ORAL ONCE
Status: COMPLETED | OUTPATIENT
Start: 2025-01-20 | End: 2025-01-20

## 2025-01-20 RX ORDER — FLUCONAZOLE 150 MG/1
150 TABLET ORAL ONCE
Qty: 1 TABLET | Refills: 0 | Status: SHIPPED | OUTPATIENT
Start: 2025-01-23 | End: 2025-01-23

## 2025-01-20 RX ORDER — ONDANSETRON 2 MG/ML
4 INJECTION INTRAMUSCULAR; INTRAVENOUS ONCE
Status: COMPLETED | OUTPATIENT
Start: 2025-01-20 | End: 2025-01-20

## 2025-01-20 RX ORDER — ONDANSETRON 4 MG/1
4 TABLET, ORALLY DISINTEGRATING ORAL EVERY 6 HOURS PRN
Qty: 20 TABLET | Refills: 0 | Status: SHIPPED | OUTPATIENT
Start: 2025-01-20

## 2025-01-20 RX ADMIN — FAMOTIDINE 20 MG: 10 INJECTION INTRAVENOUS at 07:48

## 2025-01-20 RX ADMIN — SODIUM CHLORIDE, POTASSIUM CHLORIDE, SODIUM LACTATE AND CALCIUM CHLORIDE 1500 ML: 600; 310; 30; 20 INJECTION, SOLUTION INTRAVENOUS at 07:49

## 2025-01-20 RX ADMIN — FLUCONAZOLE 150 MG: 150 TABLET ORAL at 10:22

## 2025-01-20 RX ADMIN — ONDANSETRON 4 MG: 2 INJECTION INTRAMUSCULAR; INTRAVENOUS at 07:48

## 2025-01-20 NOTE — Clinical Note
[facility] EMERGENCY DEPARTMENT  [address] [place] [address] AVE  [facility] [address]  Phone: [phone]    Mandy Barrera was seen and treated in our emergency department on 1/20/2025.  She may return to work on 01/21/2025.         Thank you for choosing [facility].    Jasiel Brown Jr., MD

## 2025-01-20 NOTE — Clinical Note
[facility] EMERGENCY DEPARTMENT  [address] [place] [address] AVE  [facility] [address]  Phone: [phone]    Mandy Barrera was seen and treated in our emergency department on 1/20/2025.  She may return to work on 01/21/2025.         Thank you for choosing [facility].    Jasiel Brown Jr., MD

## 2025-01-20 NOTE — ED PROVIDER NOTES
HPI:     Patient is a 44-year-old -American female presents to the emergency room with nausea vomiting diarrhea.  Is been going on for 5 days.  Multiple times a day.  She started to get weak and feeling fatigued and dizzy with changing of position.  Last emesis had some streaking of blood in it.  It appears to be yellow to lightly green in nature.  Patient's diarrhea appears to be the same.  No blood in the stool however.  She went to work and was noted to be very jittery and lightheaded today here in the hospital and was sent down here to the emergency room due to concerns of dehydration.  She is also having some right flank pain over the kidney area.  No history of kidney stones or overall pain in that area is about 6 out of 10.    REVIEW OF SYSTEMS  CONSTITUTIONAL: Positive for generalized weakness and fatigue   EYES:  No complaints of discharge   ENT: No complaints of sore throat or ear pain  CARDIOVASCULAR:  No complaints of chest pain, palpitations, or swelling  RESPIRATORY:  No complaints of cough or shortness of breath  GI: Positive abdominal cramping with nausea vomiting and diarrhea with some streaking of blood on the last emesis absent in the diarrhea.  MUSCULOSKELETAL:  No complaints of back pain  SKIN:  No complaints of rash  NEUROLOGIC:  No complaints of headache, focal weakness, or sensory changes  ENDOCRINE:  No complaints of polyuria or polydipsia  LYMPHATIC:  No complaints of swollen glands  GENITOURINARY: Positive for urinary frequency and right flank pain    PAST MEDICAL HISTORY  Past Medical History:   Diagnosis Date    Arthritis     Cerebral aneurysm     GERD (gastroesophageal reflux disease)     H/O: hysterectomy     Hypertension     Iron deficiency anemia     Seizure        FAMILY HISTORY  Family History   Problem Relation Age of Onset    Rheum arthritis Mother     No Known Problems Brother     No Known Problems Sister     No Known Problems Daughter     No Known Problems Brother     No  Known Problems Brother     No Known Problems Brother     No Known Problems Daughter     Breast cancer Maternal Great-Grandmother        SOCIAL HISTORY  Social History     Socioeconomic History    Marital status: Single   Tobacco Use    Smoking status: Never    Smokeless tobacco: Never   Vaping Use    Vaping status: Never Used    Passive vaping exposure: Yes   Substance and Sexual Activity    Alcohol use: No    Drug use: No    Sexual activity: Yes     Partners: Male       IMMUNIZATION HISTORY  Deferred to primary care physician.    SURGICAL HISTORY  Past Surgical History:   Procedure Laterality Date    APPENDECTOMY       SECTION      x2    CHOLECYSTECTOMY      COLONOSCOPY      CRANIOTOMY      ENDOMETRIAL ABLATION      HYSTERECTOMY         CURRENT MEDICATIONS    Current Facility-Administered Medications:     fluconazole (DIFLUCAN) tablet 150 mg, 150 mg, Oral, Once, Jasiel Brown Jr., MD    Current Outpatient Medications:     albuterol sulfate  (90 Base) MCG/ACT inhaler, Inhale 2 puffs Every 4 (Four) Hours As Needed for Wheezing or Shortness of Air., Disp: 18 g, Rfl: 0    benzonatate (TESSALON) 200 MG capsule, Take 1 capsule by mouth 3 (Three) Times a Day As Needed for Cough., Disp: 20 capsule, Rfl: 0    clobetasol (TEMOVATE) 0.05 % ointment, Apply twice a day to affected areas on breast for eczema., Disp: 15 g, Rfl: 3    Dextromethorphan-buPROPion ER (AUVELITY)  MG tablet controlled-release, Take 1 tablet by mouth Every Morning., Disp: 30 tablet, Rfl: 2    [START ON 2025] fluconazole (DIFLUCAN) 150 MG tablet, Take 1 tablet by mouth 1 (One) Time for 1 dose., Disp: 1 tablet, Rfl: 0    fluticasone (FLONASE) 50 MCG/ACT nasal spray, Use 2 sprays into the nostril as directed by provider Daily., Disp: 16 g, Rfl: 5    ibuprofen (IBU) 600 MG tablet, Take 1 tablet by mouth every 6 hours as needed with food, Disp: 24 tablet, Rfl: 0    ondansetron ODT (ZOFRAN-ODT) 4 MG disintegrating tablet, Place  "1 tablet on the tongue Every 6 (Six) Hours As Needed for Nausea or Vomiting., Disp: 20 tablet, Rfl: 0    orphenadrine (NORFLEX) 100 MG 12 hr tablet, Take 1 tablet by mouth 2 (Two) Times a Day for 14 days., Disp: 28 tablet, Rfl: 0    oxyCODONE-acetaminophen (PERCOCET) 5-325 MG per tablet, Take 1 tablet by mouth every 6 hours as needed for breakthrough pain, Disp: 6 tablet, Rfl: 0    traZODone (DESYREL) 50 MG tablet, Take 1 tablet by mouth Daily. (Patient taking differently: Take 1 tablet by mouth At Night As Needed.), Disp: 90 tablet, Rfl: 1    ubrogepant (UBRELVY) 100 MG tablet, Take 1 tablet by mouth As Needed (take as needed for prevention of migraine headaches)., Disp: 16 tablet, Rfl: 11    ALLERGIES  Allergies   Allergen Reactions    Infed [Iron Dextran] Anaphylaxis     Throat tightness    Hydromorphone Hcl Itching    [place] Butter Hives     Allergic to rafiq fruit     Ampicillin Rash    Lamotrigine Rash    Latex Rash    Lortab [Hydrocodone-Acetaminophen] Rash    Morphine Rash    Nitrofurantoin Rash    Nuts Rash    Poison Ivy Extract Rash       ABDOMINAL EXAM    VITAL SIGNS:   /82 (BP Location: Right arm, Patient Position: Sitting)   Pulse 97   Temp 98.7 °F (37.1 °C) (Oral)   Resp 18   Ht 160 cm (63\")   Wt 78 kg (172 lb)   SpO2 100%   BMI 30.47 kg/m²     Constitutional: Patient is alert and in no distress.  Patient with moderate abdominal discomfort.    ENT: There is a normal pharynx with no acute erythema or exudate and oral mucosa is moist.  Nose is clear with no drainage.  Tympanic membranes intact and non-erythemic    Cardiovascular: S1-S2 regular rate and rhythm. No murmurs, rubs or gallops.  Pulses are equal bilaterally and there is no pitting edema.    Respiratory: Patient is clear to auscultation bilaterally with no wheezing or rhonchi.  Chest wall is nontender.  There are no external lesions on the chest.  There is no crepitance.    Gastrointestinal: General Tenderness of the abdomen with no " rebound or guarding.  Bowel sounds are mildly hyperactive.  There is no abdominal distention or fluid wave.    Genitourinary: Positive suprapubic tenderness to palpation and right costovertebral angle tenderness to percussion.  Absent on the left.    Integument: No acute lesions noted.  Color appears to be normal.    Myrtle Coma Scale: Total score 15    Neurological: Patient is alert and oriented x4 and no acute findings noted.  Speech is fluent and cognition is normal.  No evidence of acute CVA.  Cranial nerves II through XII intact.  Patient with normal motor function as well as reflexes and sensation    Psychiatric: Normal affect and mood.            RADIOLOGY/PROCEDURES        No orders to display          FUTURE APPOINTMENTS     No future appointments.           COURSE & MEDICAL DECISION MAKING       Patient's partial differential diagnosis can include:    gastroenteritis, GERD, colitis, enteritis, irritable bowel, diverticulitis, diverticulosis, gastritis, esophagitis volvulus, intussusception, esophageal spasms, gastroparesis, peptic ulcer disease, perforated esophagus, perforated peptic ulcer, partial bowel obstruction, bowel obstruction,,mesenteric adenitis, mesenteric ischemia, constipation, Crohn's disease, ulcerative colitis, celiac disease and others    Patient unable to give a stool.  Patient's white count is normal potassium is borderline low with the level of 3.4 with a normal level at 3.5.  Urinalysis is clean and clear with no signs of infection with a normal white count and no anemia.  Patient will be discharged home with Diflucan at her request as well as Zofran for nausea and vomiting.  She will get a 1 day work excuse and advised to drink plenty of fluids.    Patient's level of risk: Moderate       CRITICAL CARE    CRITICAL CARE: No    CRITICAL CARE TIME: None      Recent Results (from the past 24 hours)   Lactic Acid, Plasma    Collection Time: 01/20/25  7:36 AM    Specimen: Blood   Result  Value Ref Range    Lactate 1.0 0.5 - 2.0 mmol/L   CBC Auto Differential    Collection Time: 01/20/25  7:36 AM    Specimen: Blood   Result Value Ref Range    WBC 7.09 3.40 - 10.80 10*3/mm3    RBC 4.62 3.77 - 5.28 10*6/mm3    Hemoglobin 12.4 12.0 - 15.9 g/dL    Hematocrit 38.9 34.0 - 46.6 %    MCV 84.2 79.0 - 97.0 fL    MCH 26.8 26.6 - 33.0 pg    MCHC 31.9 31.5 - 35.7 g/dL    RDW 15.0 12.3 - 15.4 %    RDW-SD 45.7 37.0 - 54.0 fl    MPV 10.2 6.0 - 12.0 fL    Platelets 271 140 - 450 10*3/mm3    Neutrophil % 53.5 42.7 - 76.0 %    Lymphocyte % 35.8 19.6 - 45.3 %    Monocyte % 8.2 5.0 - 12.0 %    Eosinophil % 1.4 0.3 - 6.2 %    Basophil % 0.4 0.0 - 1.5 %    Immature Grans % 0.7 (H) 0.0 - 0.5 %    Neutrophils, Absolute 3.79 1.70 - 7.00 10*3/mm3    Lymphocytes, Absolute 2.54 0.70 - 3.10 10*3/mm3    Monocytes, Absolute 0.58 0.10 - 0.90 10*3/mm3    Eosinophils, Absolute 0.10 0.00 - 0.40 10*3/mm3    Basophils, Absolute 0.03 0.00 - 0.20 10*3/mm3    Immature Grans, Absolute 0.05 0.00 - 0.05 10*3/mm3    nRBC 0.0 0.0 - 0.2 /100 WBC   Urinalysis With Culture If Indicated - Urine, Clean Catch    Collection Time: 01/20/25  7:54 AM    Specimen: Urine, Clean Catch   Result Value Ref Range    Color, UA Yellow Yellow, Straw    Appearance, UA Clear Clear    pH, UA 7.0 5.0 - 8.0    Specific Gravity, UA 1.006 1.005 - 1.030    Glucose, UA Negative Negative    Ketones, UA Negative Negative    Bilirubin, UA Negative Negative    Blood, UA Negative Negative    Protein, UA Negative Negative    Leuk Esterase, UA Negative Negative    Nitrite, UA Negative Negative    Urobilinogen, UA 0.2 E.U./dL 0.2 - 1.0 E.U./dL   Comprehensive Metabolic Panel    Collection Time: 01/20/25  8:28 AM    Specimen: Blood   Result Value Ref Range    Glucose 108 (H) 65 - 99 mg/dL    BUN 11 6 - 20 mg/dL    Creatinine 0.65 0.57 - 1.00 mg/dL    Sodium 137 136 - 145 mmol/L    Potassium 3.4 (L) 3.5 - 5.2 mmol/L    Chloride 102 98 - 107 mmol/L    CO2 24.0 22.0 - 29.0 mmol/L     Calcium 8.9 8.6 - 10.5 mg/dL    Total Protein 7.5 6.0 - 8.5 g/dL    Albumin 4.3 3.5 - 5.2 g/dL    ALT (SGPT) 20 1 - 33 U/L    AST (SGOT) 23 1 - 32 U/L    Alkaline Phosphatase 73 39 - 117 U/L    Total Bilirubin 0.3 0.0 - 1.2 mg/dL    Globulin 3.2 gm/dL    A/G Ratio 1.3 g/dL    BUN/Creatinine Ratio 16.9 7.0 - 25.0    Anion Gap 11.0 5.0 - 15.0 mmol/L    eGFR 111.5 >60.0 mL/min/1.73   Lipase    Collection Time: 01/20/25  8:28 AM    Specimen: Blood   Result Value Ref Range    Lipase 38 13 - 60 U/L   Magnesium    Collection Time: 01/20/25  8:28 AM    Specimen: Blood   Result Value Ref Range    Magnesium 2.0 1.6 - 2.6 mg/dL          Old charts were reviewed per [facility] EMR.  Pertinent details are summarized above.  All laboratory, radiologic, and EKG studies that were performed in the Emergency Department were a necessary part of the evaluation needed to exclude unstable or  emergent medical conditions.     Patient was hemodynamically and neurologically stable in the ED.   Pertinent studies were reviewed as above.     The patient received:  Medications   fluconazole (DIFLUCAN) tablet 150 mg (has no administration in time range)   lactated ringers bolus 1,500 mL (1,500 mL Intravenous New Bag 1/20/25 0806)   famotidine (PEPCID) injection 20 mg (20 mg Intravenous Given 1/20/25 6627)   ondansetron (ZOFRAN) injection 4 mg (4 mg Intravenous Given 1/20/25 8348)            Diagnoses that have been ruled out:   None   Diagnoses that are still under consideration:   None   Final diagnoses:   Gastroenteritis   Generalized abdominal pain   Hypokalemia        FINAL IMPRESSION   Diagnosis Plan   1. Gastroenteritis        2. Generalized abdominal pain        3. Hypokalemia              Jasiel Brown Jr, MD        ED Disposition       ED Disposition   Discharge    Condition   Stable    Comment   --                 Dragon disclaimer:  Part of this note may be an electronic transcription/translation of spoken language to printed text using  the Dragon Dictation System.     I have reviewed the patient’s prescription history via a prescription monitoring program.  This information is consistent with my knowledge of the patient’s controlled substance use history.        Jasiel Brown Jr., MD  01/20/25 1012

## 2025-04-09 ENCOUNTER — HOSPITAL ENCOUNTER (EMERGENCY)
Facility: HOSPITAL | Age: 45
Discharge: HOME | End: 2025-04-09
Admitting: FAMILY MEDICINE
Payer: COMMERCIAL

## 2025-04-09 ENCOUNTER — NURSE TRIAGE (OUTPATIENT)
Dept: CALL CENTER | Facility: HOSPITAL | Age: 45
End: 2025-04-09
Payer: COMMERCIAL

## 2025-04-09 VITALS
BODY MASS INDEX: 31.71 KG/M2 | OXYGEN SATURATION: 99 % | HEART RATE: 95 BPM | TEMPERATURE: 98.6 F | WEIGHT: 179 LBS | RESPIRATION RATE: 18 BRPM | DIASTOLIC BLOOD PRESSURE: 81 MMHG | HEIGHT: 63 IN | SYSTOLIC BLOOD PRESSURE: 139 MMHG

## 2025-04-09 DIAGNOSIS — T14.8XXA SKIN AVULSION: Primary | ICD-10-CM

## 2025-04-09 PROCEDURE — 90715 TDAP VACCINE 7 YRS/> IM: CPT | Performed by: NURSE PRACTITIONER

## 2025-04-09 PROCEDURE — 90471 IMMUNIZATION ADMIN: CPT | Performed by: NURSE PRACTITIONER

## 2025-04-09 PROCEDURE — 25010000002 TETANUS-DIPHTH-ACELL PERTUSSIS 5-2.5-18.5 LF-MCG/0.5 SUSPENSION PREFILLED SYRINGE: Performed by: NURSE PRACTITIONER

## 2025-04-09 PROCEDURE — 99283 EMERGENCY DEPT VISIT LOW MDM: CPT

## 2025-04-09 RX ADMIN — TETANUS TOXOID, REDUCED DIPHTHERIA TOXOID AND ACELLULAR PERTUSSIS VACCINE, ADSORBED 0.5 ML: 5; 2.5; 8; 8; 2.5 SUSPENSION INTRAMUSCULAR at 17:16

## 2025-04-09 NOTE — TELEPHONE ENCOUNTER
"Santiagoer cut the side/tip of her right thumb with her mandolin slicer 45 minutes ago.  She has held pressure and also had a pressure dressing applied.  It is still bleeding.  Not sure when last tetanus was.  Reason for Disposition  • [1] Bleeding AND [2] won't stop after 10 minutes of direct pressure (using correct technique)    Additional Information  • Negative: [1] Major bleeding (e.g., actively dripping or spurting) AND [2] can't be stopped  • Negative: Amputation  • Negative: Shock suspected (e.g., cold/pale/clammy skin, too weak to stand, low BP, rapid pulse)  • Negative: [1] Knife wound (or other possibly deep cut) AND [2] to chest, abdomen, back, neck, or head  • Negative: [1] Self-injury (e.g., cutting, self-harm) AND [2] suicidal or out-of-control  • Negative: Sounds like a life-threatening emergency to the triager  • Negative: [1] Animal bite AND [2] broken skin  • Negative: [1] Human bite AND [2] broken skin  • Negative: Puncture wound  • Negative: Skin is split open or gaping (or length > 1/2 inch or 12 mm on the skin, 1/4 inch or 6 mm on the face)  • Negative: [1] Deep cut AND [2] can see bone or tendons  • Negative: Cut over knuckle (MCP joint)    Answer Assessment - Initial Assessment Questions  1. APPEARANCE of INJURY: \"What does the injury look like?\"       Sliced side of finger off.  2. SIZE: \"How large is the cut?\"      small  3. BLEEDING: \"Is it bleeding now?\" If Yes, ask: \"Is it difficult to stop?\"       yes  4. LOCATION: \"Where is the injury located?\"       Side of right thumb  5. ONSET: \"How long ago did the injury occur?\"       45 min ago  6. MECHANISM: \"Tell me how it happened.\"       Using mandolin slicer.  7. TETANUS: \"When was the last tetanus booster?\"      Not sure  8. PREGNANCY: \"Is there any chance you are pregnant?\" \"When was your last menstrual period?\"      na    Protocols used: Cuts and Lacerations-ADULT-AH    "

## 2025-04-09 NOTE — ED NOTES
Antibiotic ointment applied. Adaptic dressing with bulky dressing applied. Metal finger splint applied to right thumb. Patient tolerated well.

## 2025-04-09 NOTE — DISCHARGE INSTRUCTIONS
Return to ER if symptoms worsen   Leave dressing intact for 24 hours  Follow up with primary care provider this week

## 2025-04-09 NOTE — ED PROVIDER NOTES
Subjective   History of Present Illness  Patient is a 44-year-old female presents the emergency department with laceration to the right thumb.  She sliced it on a mandolin slicer while cutting vegetables about an hour ago.  She states she was unable to get the bleeding controlled at home.  She is right-hand dominant.  She denies any other injury.  Unsure of her last tetanus immunization.  Denies any other injury.    History provided by:  Patient   used: No        Review of Systems   Constitutional: Negative.    HENT: Negative.     Eyes: Negative.    Respiratory: Negative.     Cardiovascular: Negative.    Gastrointestinal: Negative.    Endocrine: Negative.    Genitourinary: Negative.    Musculoskeletal:         Patient is a 44-year-old female presents the emergency department with laceration to the right thumb.  She sliced it on a mandolin slicer while cutting vegetables about an hour ago.  She states she was unable to get the bleeding controlled at home.  She is right-hand dominant.  She denies any other injury.  Unsure of her last tetanus immunization.  Denies any other injury.     Skin: Negative.    Allergic/Immunologic: Negative.    Neurological: Negative.    Hematological: Negative.    Psychiatric/Behavioral: Negative.     All other systems reviewed and are negative.      Past Medical History:   Diagnosis Date    Arthritis     Cerebral aneurysm     GERD (gastroesophageal reflux disease)     H/O: hysterectomy     Hypertension     Iron deficiency anemia     Seizure        Allergies   Allergen Reactions    Infed [Iron Dextran] Anaphylaxis     Throat tightness    Hydromorphone Hcl Itching    [place]af[place] Butter Hives     Allergic to rafiq fruit     Ampicillin Rash    Lamotrigine Rash    Latex Rash    Lortab [Hydrocodone-Acetaminophen] Rash    Morphine Rash    Nitrofurantoin Rash    Nuts Rash    Poison Ivy Extract Rash       Past Surgical History:   Procedure Laterality Date    APPENDECTOMY        SECTION      x2    CHOLECYSTECTOMY      COLONOSCOPY      CRANIOTOMY      ENDOMETRIAL ABLATION      HYSTERECTOMY         Family History   Problem Relation Age of Onset    Rheum arthritis Mother     No Known Problems Brother     No Known Problems Sister     No Known Problems Daughter     No Known Problems Brother     No Known Problems Brother     No Known Problems Brother     No Known Problems Daughter     Breast cancer Maternal Great-Grandmother        Social History     Socioeconomic History    Marital status: Single   Tobacco Use    Smoking status: Never    Smokeless tobacco: Never   Vaping Use    Vaping status: Never Used    Passive vaping exposure: Yes   Substance and Sexual Activity    Alcohol use: No    Drug use: No    Sexual activity: Yes     Partners: Male       Prior to Admission medications    Medication Sig Start Date End Date Taking? Authorizing Provider   albuterol sulfate  (90 Base) MCG/ACT inhaler Inhale 2 puffs Every 4 (Four) Hours As Needed for Wheezing or Shortness of Air. 10/1/24   Jeremie Humphrey MD   benzonatate (TESSALON) 200 MG capsule Take 1 capsule by mouth 3 (Three) Times a Day As Needed for Cough. 10/1/24   Jeremie Humphrey MD   clobetasol (TEMOVATE) 0.05 % ointment Apply twice a day to affected areas on breast for eczema. 8/12/21      Dextromethorphan-buPROPion ER (AUVELITY)  MG tablet controlled-release Take 1 tablet by mouth Every Morning. 5/1/24   Stefano Lockhart DO   fluticasone (FLONASE) 50 MCG/ACT nasal spray Use 2 sprays into the nostril as directed by provider Daily. 5/23/24   Mei Baker APRN   ibuprofen (IBU) 600 MG tablet Take 1 tablet by mouth every 6 hours as needed with food 8/2/24      ondansetron ODT (ZOFRAN-ODT) 4 MG disintegrating tablet Place 1 tablet on the tongue Every 6 (Six) Hours As Needed for Nausea or Vomiting. 1/20/25   Jasiel Brown Jr., MD   orphenadrine (NORFLEX) 100 MG 12 hr tablet Take 1 tablet by mouth 2 (Two) Times a Day for 14  "days. 11/17/24 12/3/24  Sean Almonte MD   oxyCODONE-acetaminophen (PERCOCET) 5-325 MG per tablet Take 1 tablet by mouth every 6 hours as needed for breakthrough pain 8/2/24      traZODone (DESYREL) 50 MG tablet Take 1 tablet by mouth Daily.  Patient taking differently: Take 1 tablet by mouth At Night As Needed. 2/23/24   Stefano Lockhart, DO   ubrogepant (UBRELVY) 100 MG tablet Take 1 tablet by mouth As Needed (take as needed for prevention of migraine headaches). 11/22/24   Stefano Lockhart, DO       /81 (BP Location: Right arm, Patient Position: Sitting)   Pulse 95   Temp 98.6 °F (37 °C) (Oral)   Resp 18   Ht 160 cm (63\")   Wt 81.2 kg (179 lb)   SpO2 99%   BMI 31.71 kg/m²     Objective   Physical Exam  Vitals and nursing note reviewed.   Constitutional:       Appearance: She is well-developed.   HENT:      Head: Normocephalic and atraumatic.   Eyes:      Conjunctiva/sclera: Conjunctivae normal.      Pupils: Pupils are equal, round, and reactive to light.   Neck:      Thyroid: No thyromegaly.      Trachea: No tracheal deviation.   Cardiovascular:      Rate and Rhythm: Normal rate and regular rhythm.      Heart sounds: Normal heart sounds.   Pulmonary:      Effort: Pulmonary effort is normal. No respiratory distress.      Breath sounds: Normal breath sounds. No wheezing or rales.   Chest:      Chest wall: No tenderness.   Musculoskeletal:      Cervical back: Normal range of motion and neck supple.      Comments: Right foot: There is a skin avulsion noted to the tuft aspect of the right thumb.  Mild bleeding is noted.  Flexion extension is intact to the digit.  Peripheral pulses are palpable to the extremity.   Skin:     General: Skin is warm and dry.   Neurological:      Mental Status: She is alert and oriented to person, place, and time.      Cranial Nerves: No cranial nerve deficit.      Deep Tendon Reflexes: Reflexes are normal and symmetric.   Psychiatric:         Behavior: Behavior normal.       "   Thought Content: Thought content normal.         Judgment: Judgment normal.         Procedures         Lab Results (last 24 hours)       ** No results found for the last 24 hours. **            No orders to display       ED Course        Medical Decision Making  Patient is a 44-year-old female presents the emergency department with laceration to the right thumb.  She sliced it on a mandolin slicer while cutting vegetables about an hour ago.  She states she was unable to get the bleeding controlled at home.  She is right-hand dominant.  She denies any other injury.  Unsure of her last tetanus immunization.  Denies any other injury.  Course of treatment in the er; non toxic appearing. Right thumb: skin avulsion noted to tuft aspect of right thumb with mild bleeding noted.  Cleansed with Hibiclens and saline Surgicel and Coban dressing applied with a that the guard as well.  Patient updated on her tetanus.  Advised to leave the dressing intact for the next 24 hours.  Patient in agreement with care plan and voices understanding of instructions.  Patient be discharged shortly in stable condition.    Problems Addressed:  Skin avulsion: complicated acute illness or injury    Risk  Prescription drug management.         Final diagnoses:   Skin avulsion          Liliana Falcon, APRN  04/10/25 0035

## 2025-04-09 NOTE — Clinical Note
[facility] EMERGENCY DEPARTMENT  [address] [place] [address] AVE  [facility] [address]  Phone: [phone]    Mandy Barrera was seen and treated in our emergency department on 4/9/2025.  She may return to work on 04/09/2025.  Patient must wear bandage for 24 hours       Thank you for choosing [facility].    Gita Ambriz RN

## 2025-08-12 ENCOUNTER — OFFICE VISIT (OUTPATIENT)
Dept: FAMILY MEDICINE CLINIC | Facility: CLINIC | Age: 45
End: 2025-08-12
Payer: COMMERCIAL

## 2025-08-12 VITALS
BODY MASS INDEX: 30.83 KG/M2 | SYSTOLIC BLOOD PRESSURE: 120 MMHG | HEART RATE: 93 BPM | WEIGHT: 174 LBS | DIASTOLIC BLOOD PRESSURE: 82 MMHG | TEMPERATURE: 97.3 F | HEIGHT: 63 IN | OXYGEN SATURATION: 98 %

## 2025-08-12 DIAGNOSIS — R10.13 EPIGASTRIC ABDOMINAL PAIN: ICD-10-CM

## 2025-08-12 DIAGNOSIS — F33.2 SEVERE EPISODE OF RECURRENT MAJOR DEPRESSIVE DISORDER, WITHOUT PSYCHOTIC FEATURES: Primary | ICD-10-CM

## 2025-08-12 RX ORDER — ESCITALOPRAM OXALATE 10 MG/1
10 TABLET ORAL DAILY
Qty: 30 TABLET | Refills: 0 | Status: SHIPPED | OUTPATIENT
Start: 2025-08-12

## (undated) DEVICE — FORCEPS BX L240CM DIA2.4MM L NDL RAD JAW 4 133340

## (undated) DEVICE — ENDO KIT,LOURDES HOSPITAL: Brand: MEDLINE INDUSTRIES, INC.

## (undated) DEVICE — TRAP POLYP ETRAP